# Patient Record
Sex: MALE | Race: WHITE | NOT HISPANIC OR LATINO | Employment: OTHER | URBAN - METROPOLITAN AREA
[De-identification: names, ages, dates, MRNs, and addresses within clinical notes are randomized per-mention and may not be internally consistent; named-entity substitution may affect disease eponyms.]

---

## 2017-01-03 ENCOUNTER — TELEPHONE (OUTPATIENT)
Dept: INTERNAL MEDICINE | Facility: CLINIC | Age: 82
End: 2017-01-03

## 2017-01-03 NOTE — TELEPHONE ENCOUNTER
----- Message from Bushra Gonzalez sent at 1/3/2017 11:08 AM CST -----  Contact: Florencia Redmond [phone]  Checking on the results of the echo that was taken.  Trying to schedule his surgery but waiting on this.  Please call her at [phone]

## 2017-01-04 ENCOUNTER — OFFICE VISIT (OUTPATIENT)
Dept: INTERNAL MEDICINE | Facility: CLINIC | Age: 82
End: 2017-01-04
Payer: MEDICARE

## 2017-01-04 VITALS
HEART RATE: 76 BPM | WEIGHT: 156.06 LBS | RESPIRATION RATE: 12 BRPM | TEMPERATURE: 98 F | DIASTOLIC BLOOD PRESSURE: 70 MMHG | SYSTOLIC BLOOD PRESSURE: 106 MMHG | BODY MASS INDEX: 23.65 KG/M2 | HEIGHT: 68 IN

## 2017-01-04 DIAGNOSIS — Z95.810 AICD (AUTOMATIC CARDIOVERTER/DEFIBRILLATOR) PRESENT: ICD-10-CM

## 2017-01-04 DIAGNOSIS — E78.5 HYPERLIPIDEMIA, UNSPECIFIED HYPERLIPIDEMIA TYPE: ICD-10-CM

## 2017-01-04 DIAGNOSIS — I48.20 CHRONIC ATRIAL FIBRILLATION: ICD-10-CM

## 2017-01-04 DIAGNOSIS — I10 HTN (HYPERTENSION), BENIGN: ICD-10-CM

## 2017-01-04 DIAGNOSIS — E03.9 HYPOTHYROIDISM, UNSPECIFIED TYPE: ICD-10-CM

## 2017-01-04 DIAGNOSIS — I25.119 CORONARY ARTERY DISEASE INVOLVING NATIVE CORONARY ARTERY OF NATIVE HEART WITH ANGINA PECTORIS: ICD-10-CM

## 2017-01-04 DIAGNOSIS — S76.211A GROIN STRAIN, RIGHT, INITIAL ENCOUNTER: Primary | ICD-10-CM

## 2017-01-04 PROCEDURE — 1125F AMNT PAIN NOTED PAIN PRSNT: CPT | Mod: S$GLB,,, | Performed by: INTERNAL MEDICINE

## 2017-01-04 PROCEDURE — 99999 PR PBB SHADOW E&M-EST. PATIENT-LVL III: CPT | Mod: PBBFAC,,, | Performed by: INTERNAL MEDICINE

## 2017-01-04 PROCEDURE — 3074F SYST BP LT 130 MM HG: CPT | Mod: S$GLB,,, | Performed by: INTERNAL MEDICINE

## 2017-01-04 PROCEDURE — 1157F ADVNC CARE PLAN IN RCRD: CPT | Mod: S$GLB,,, | Performed by: INTERNAL MEDICINE

## 2017-01-04 PROCEDURE — 1159F MED LIST DOCD IN RCRD: CPT | Mod: S$GLB,,, | Performed by: INTERNAL MEDICINE

## 2017-01-04 PROCEDURE — 99213 OFFICE O/P EST LOW 20 MIN: CPT | Mod: S$GLB,,, | Performed by: INTERNAL MEDICINE

## 2017-01-04 PROCEDURE — 1160F RVW MEDS BY RX/DR IN RCRD: CPT | Mod: S$GLB,,, | Performed by: INTERNAL MEDICINE

## 2017-01-04 PROCEDURE — 3078F DIAST BP <80 MM HG: CPT | Mod: S$GLB,,, | Performed by: INTERNAL MEDICINE

## 2017-01-04 RX ORDER — TRAMADOL HYDROCHLORIDE 50 MG/1
50 TABLET ORAL EVERY 6 HOURS PRN
Qty: 100 TABLET | Refills: 3 | Status: SHIPPED | OUTPATIENT
Start: 2017-01-04 | End: 2017-01-14

## 2017-01-04 NOTE — MR AVS SNAPSHOT
[facility] - Internal Medicine  [address] [place], [place] [address], [address]  [place] [address]  Phone: [phone]  Fax: [phone]                  Danyel Rangel   2017 2:30 PM   Office Visit    Description:  Male : 1935   Provider:  Pa Spence MD   Department:  [facility] - Internal Medicine           Reason for Visit     Leg Pain           Diagnoses this Visit        Comments    Groin strain, right, initial encounter    -  Primary     Coronary artery disease involving native coronary artery of native heart with angina pectoris         Chronic atrial fibrillation         HTN (hypertension), benign         Hyperlipidemia, unspecified hyperlipidemia type         AICD (automatic cardioverter/defibrillator) present         Hypothyroidism, unspecified type                To Do List           Future Appointments        Provider Department Dept Phone    2017 9:15 AM Emma Melgar MD [facility] - Cardiology [phone]      Goals (5 Years of Data)     None      Follow-Up and Disposition     Return if symptoms worsen or fail to improve.       These Medications        Disp Refills Start End    tramadol (ULTRAM) 50 mg tablet 100 tablet 3 2017    Take 1 tablet (50 mg total) by mouth every 6 (six) hours as needed for Pain. - Oral    Pharmacy: [facility] [address] [place] [address] [address] [facility] Ph #: [phone]         [facility] On Call     [facility] On Call Nurse Care Line - / Assistance  Registered nurses in the [facility] On Call Center provide clinical advisement, health education, appointment booking, and other advisory services.  Call for this free service at [phone].             Medications           Message regarding Medications     Verify the changes and/or additions to your medication regime listed below are the same as discussed with your clinician today.  If any of these changes or additions are incorrect, please notify your  "healthcare provider.        START taking these NEW medications        Refills    tramadol (ULTRAM) 50 mg tablet 3    Sig: Take 1 tablet (50 mg total) by mouth every 6 (six) hours as needed for Pain.    Class: Normal    Route: Oral           Verify that the below list of medications is an accurate representation of the medications you are currently taking.  If none reported, the list may be blank. If incorrect, please contact your healthcare provider. Carry this list with you in case of emergency.           Current Medications     allopurinol (ZYLOPRIM) 300 MG tablet Take 300 mg by mouth once daily.    levothyroxine (SYNTHROID) 50 MCG tablet Take 50 mcg by mouth once daily.    lisinopril (PRINIVIL,ZESTRIL) 20 MG tablet Take 20 mg by mouth once daily.    metoprolol succinate (TOPROL-XL) 25 MG 24 hr tablet Take 25 mg by mouth once daily.    potassium chloride SA (K-DUR,KLOR-CON) 10 MEQ tablet Take 10 mEq by mouth 2 (two) times daily.    simvastatin (ZOCOR) 20 MG tablet Take 20 mg by mouth every evening.    apixaban (ELIQUIS) 5 mg Tab Take 1 tablet (5 mg total) by mouth 2 (two) times daily.    furosemide (LASIX) 20 MG tablet Take 20 mg by mouth 2 (two) times daily.    ondansetron (ZOFRAN-ODT) 4 MG TbDL Take 1 tablet (4 mg total) by mouth every 6 (six) hours as needed.    tramadol (ULTRAM) 50 mg tablet Take 1 tablet (50 mg total) by mouth every 6 (six) hours as needed for Pain.           Clinical Reference Information           Vital Signs - Last Recorded  Most recent update: 1/4/2017  2:14 PM by Richi Mendez MA    BP Pulse Temp Resp Ht Wt    106/70 76 97.8 °F (36.6 °C) (Oral) 12 5' 8" (1.727 m) 70.8 kg (156 lb 1.4 oz)    BMI                23.73 kg/m2          Blood Pressure          Most Recent Value    BP  106/70      Allergies as of 1/4/2017     Xarelto [Rivaroxaban]      Immunizations Administered on Date of Encounter - 1/4/2017     None      Orders Placed During Today's Visit      Normal Orders This Visit    " Ambulatory Referral to Physical/Occupational Therapy

## 2017-01-04 NOTE — PROGRESS NOTES
Subjective:       Patient ID: Danyel Rangel is a 81 y.o. male.    Chief Complaint: Leg Pain (c/o pain to right upper thigh/groin area x 3 weeks, patient requests injection)    HPI  Pt still c/o R groin pain, poor response to muscle relaxers.  Walking with walker.  Review of Systems    Objective:      Physical Exam   Constitutional: He appears well-developed. No distress.   HENT:   Head: Normocephalic.   Eyes: EOM are normal. No scleral icterus.   Neck: Normal range of motion. No tracheal deviation present.   Cardiovascular: Normal rate, regular rhythm and intact distal pulses.    Pulmonary/Chest: Effort normal. No respiratory distress.   Abdominal: He exhibits no distension.   Musculoskeletal: He exhibits no edema.   Pain with ext rotation R hip   Neurological: He is alert.   Skin: Skin is warm and dry. No rash noted. He is not diaphoretic. No erythema.   Psychiatric: He has a normal mood and affect. His behavior is normal.   Vitals reviewed.      Assessment:       1. Groin strain, right, initial encounter    2. Coronary artery disease involving native coronary artery of native heart with angina pectoris    3. Chronic atrial fibrillation    4. HTN (hypertension), benign    5. Hyperlipidemia, unspecified hyperlipidemia type    6. AICD (automatic cardioverter/defibrillator) present    7. Hypothyroidism, unspecified type        Plan:       Danyel was seen today for leg pain.    Diagnoses and all orders for this visit:    Groin strain, right, initial encounter  -     tramadol (ULTRAM) 50 mg tablet; Take 1 tablet (50 mg total) by mouth every 6 (six) hours as needed for Pain.  -     Ambulatory Referral to Physical/Occupational Therapy    Coronary artery disease involving native coronary artery of native heart with angina pectoris    Chronic atrial fibrillation    HTN (hypertension), benign   Well-cont    Hyperlipidemia, unspecified hyperlipidemia type    AICD (automatic cardioverter/defibrillator)  present    Hypothyroidism, unspecified type      Return if symptoms worsen or fail to improve.

## 2017-01-09 ENCOUNTER — HOSPITAL ENCOUNTER (OUTPATIENT)
Dept: PREADMISSION TESTING | Facility: HOSPITAL | Age: 82
Discharge: HOME | End: 2017-01-09
Payer: MEDICARE

## 2017-01-09 ENCOUNTER — ANESTHESIA EVENT (OUTPATIENT)
Dept: SURGERY | Facility: HOSPITAL | Age: 82
End: 2017-01-09
Payer: MEDICARE

## 2017-01-09 DIAGNOSIS — Z01.818 PRE-OP TESTING: Primary | ICD-10-CM

## 2017-01-09 RX ORDER — CEFAZOLIN SODIUM 2 G/50ML
2 SOLUTION INTRAVENOUS
Status: CANCELLED | OUTPATIENT
Start: 2017-01-10

## 2017-01-09 RX ORDER — SODIUM CHLORIDE, SODIUM LACTATE, POTASSIUM CHLORIDE, CALCIUM CHLORIDE 600; 310; 30; 20 MG/100ML; MG/100ML; MG/100ML; MG/100ML
INJECTION, SOLUTION INTRAVENOUS CONTINUOUS
Status: CANCELLED | OUTPATIENT
Start: 2017-01-09

## 2017-01-09 RX ORDER — LIDOCAINE HYDROCHLORIDE 10 MG/ML
1 INJECTION, SOLUTION EPIDURAL; INFILTRATION; INTRACAUDAL; PERINEURAL ONCE
Status: CANCELLED | OUTPATIENT
Start: 2017-01-09 | End: 2017-01-09

## 2017-01-09 NOTE — ANESTHESIA PREPROCEDURE EVALUATION
"                                                                                                             01/09/2017     Danyel Rangel is a 81 y.o., male is scheduled for ORIF right humerus under reg/gen on 1/10/2017.    PCP note in [facility]:  Pt "clear for GA."  Cardiology communication in [facility]    Dual chamber ICD [employer] Ilsto 7DRT  Serial # [id number]  Indication for placement:  Atrial fibrillation  Last interrogated:  4/2016 at outside facility (14% atrial pacing with 80% a-fib)  Now followed by Dr. JOSIE Melgar with next interrogation 2/2017  Per [employer] tech support:  Place magnet over device for the duration of the procedure.      Past Surgical History   Procedure Laterality Date    Coronary artery bypass graft  2006    Cardiac catheterization  06/2016    Total knee arthroplasty Left 2010             [facility] Anesthesia Evaluation    I have reviewed the Patient Summary Reports.    I have reviewed the Nursing Notes.   I have reviewed the Medications.     Review of Systems  Anesthesia Hx:  No problems with previous Anesthesia  History of prior surgery of interest to airway management or planning: heart surgery. Previous anesthesia: General  Denies Personal Hx of Anesthesia complications.   Social:  Non-Smoker, No Alcohol Use    Hematology/Oncology:        Hematology Comments: ASA and apixaban on hold per cardiology and PCP; will resume post-op per PCP   EENT/Dental:EENT/Dental Normal   Cardiovascular:   Exercise tolerance: good Pacemaker (placed initially in 2010 atrial fibrillation with upgrade to ICD 3/2015; right chest) Hypertension, well controlled Past MI (2010) CAD  CABG/stent (2010 and 2016) Dysrhythmias (rate controlled with apixaban) atrial fibrillation  Denies Angina.     hyperlipidemia     ECG has been reviewed.    Pulmonary:   Denies Shortness of breath.    Renal/:  Renal/ Normal     Hepatic/GI:   GERD, well controlled    Musculoskeletal:   Arthritis  Fracture of right humerus s/p fall 12/2016 " denies pain and states does not have problems moving arm up; pt also states he strained right  groin during fall and has been using a walker since that time.   Neurological:  Neurology Normal    Endocrine:   Hypothyroidism        Physical Exam  General:  Well nourished    Airway/Jaw/Neck:  Airway Findings: Mouth Opening: Normal Tongue: Normal  General Airway Assessment: Adult  Mallampati: II  TM Distance: Normal, at least 6 cm        Eyes/Ears/Nose:  EYES/EARS/NOSE FINDINGS: Normal   Dental:  Dental Findings: Periodontal disease, Severe   Chest/Lungs:  Chest/Lungs Clear    Heart/Vascular:  Heart Findings: Normal Heart murmur: negative Other Heart Findings: Dual chamber ICD right chest     Abdomen:  Abdomen Findings: Normal    Musculoskeletal:  Musculoskeletal Findings: (RUE splint present ) Gait disturbance, unsteady gait and walks with walker     Mental Status:  Mental Status Findings: Normal        2D Echo w/CFD 12/28/2016  CONCLUSIONS     1 - Normal left ventricular systolic function (EF 55-60%).     2 - Concentric remodeling.     3 - Normal right ventricular systolic function .     4 - The estimated PA systolic pressure is 31 mmHg.     5 - Biatrial enlargement.   This document has been electronically    SIGNED BY: Emma Melgar MD On: 12/29/2016 08:40      Anesthesia Plan  Type of Anesthesia, risks & benefits discussed:  Anesthesia Type:  regional, general  Patient's Preference:   Intra-op Monitoring Plan:   Intra-op Monitoring Plan Comments:   Post Op Pain Control Plan:   Post Op Pain Control Plan Comments:   Induction:   IV  Beta Blocker:  Patient is on a Beta-Blocker and has received one dose within the past 24 hours (No further documentation required).       Informed Consent: Patient understands risks and agrees with Anesthesia plan.  Questions answered. Anesthesia consent signed with patient.  ASA Score: 3     Day of Surgery Review of History & Physical: I have interviewed and examined the patient. I have  reviewed the patient's H&P dated:  There are no significant changes.  H&P update referred to the surgeon.     Anesthesia Plan Notes: Anesthesia consent will be obtained prior to procedure on 1/10/2017.     Dual chamber ICD [employer] Ilsto 7DRT  Serial # [id number]  Indication for placement:  Atrial fibrillation  Last interrogated:  4/2016 at outside facility (14% atrial pacing with 80% a-fib)  Now followed by Dr. JOSIE Melgar with next interrogation 2/2017  Per [employer] tech support:  Place magnet over device for the duration of the procedure.        Ready For Surgery From Anesthesia Perspective.

## 2017-01-09 NOTE — DISCHARGE INSTRUCTIONS
Your surgery is scheduled for 1/10/17.    Please report to Outpatient Surgery Intake Office on the 2nd FLOOR at 6:45 a.m.          INSTRUCTIONS IMPORTANT!!!  ¨ Do not eat or drink after 12 midnight-including water. OK to brush teeth, no   gum, candy or mints!    ¨ Take only these medicines with a small swallow of water-morning of surgery: Levothyroxine, Lisinopril, and metoprolol         ____  No powder, lotions or creams to surgical area.  ____  Please remove all jewelry, including piercings and leave at home.  ____  No money or valuables needed. Please leave at home.  ____  Please bring any documents given by your doctor.  ____  If going home the same day, arrange for a ride home. You will not be able to             drive if Anesthesia was used.  ____  Children under 18 years require a parent / guardian present the entire time             they are in surgery / recovery.  ____  Wear loose fitting clothing. Allow for dressings, bandages.  ____  Stop Aspirin, Ibuprofen, Motrin and Aleve at least 3-5 days before surgery, unless otherwise instructed by your doctor, or the nurse.   You MAY use Tylenol/acetaminophen until day of surgery.  ____  Wash the surgical area with Hibiclens the night before surgery, and again the             morning of surgery.  Be sure to rinse hibiclens off completely (if instructed by   nurse).  ____  If you take diabetic medication, do not take am of surgery unless instructed by Doctor.  ____  Call MD for temperature above 101 degrees.  ____ Stop taking any Fish Oil supplement or any Vitamins that contain Vitamin E at least 5 days prior to surgery.  ____ Do Not wear your contact lenses the day of your procedure.  You may wear your glasses.        I have read or had read and explained to me, and understand the above information.  Additional comments or instructions:  For additional questions call [phone]     Take a Hibiclens shower twice a day for 3 days prior to surgery, including the  morning of surgery.   Gargle with Listerine twice a day for 2 days prior to surgery, including the morning of surgery.      Pre-Op Bathing Instructions    Before surgery, you can play an important role in your own health.    Because skin is not sterile, we need to be sure that your skin is as free of germs as possible. By following the instructions below, you can reduce the number of germs on your skin before surgery.    IMPORTANT: You will need to shower with a special soap called Hibiclens*, available at any pharmacy.  If you are allergic to Chlorhexidine (the antiseptic in Hibiclens), use an antibacterial soap such as Dial Soap for your preoperative shower.  You will shower with Hibiclens both the night before your surgery and the morning of your surgery.  Do not use Hibiclens on the head, face or genitals to avoid injury to those areas.    STEP #1: THE NIGHT BEFORE YOUR SURGERY     1. Do not shave the area of your body where your surgery will be performed.  2. Shower and wash your hair and body as usual with your normal soap and shampoo.  3. Rinse your hair and body thoroughly after you shower to remove all soap residue.  4. With your hand, apply one packet of Hibiclens soap to the surgical site.   5. Wash the site gently for five (5) minutes. Do not scrub your skin too hard.   6. Do not wash with your regular soap after Hibiclens is used.  7. Rinse your body thoroughly.  8. Pat yourself dry with a clean, soft towel.  9. Do not use lotion, cream, or powder.  10. Wear clean clothes.    STEP #2: THE MORNING OF YOUR SURGERY     1. Repeat Step #1.    * Not to be used by people allergic to Chlorhexidine.          Anesthesia: General Anesthesia  Youre due to have surgery. During surgery, youll be given medication called anesthesia. (It is also called anesthetic.) This will keep you comfortable and pain-free. Your anesthesia provider will use general anesthesia. This sheet tells you more about it.  What is general  anesthesia?     You are watched continuously during your procedure by the anesthesia provider   General anesthesia puts you into a state like deep sleep. It goes into the bloodstream (IV anesthetics), into the lungs (gas anesthetics), or both. You feel nothing during the procedure. You will not remember it. During the procedure, the anesthesia provider monitors you continuously. He or she checks your heart rate and rhythm, blood pressure, breathing, and blood oxygen.  · IV Anesthetics. IV anesthetics are given through an IV line in your arm. Theyre often given first. This is so you are asleep before a gas anesthetic is started. Some kinds of IV anesthetics relieve pain. Others relax you. Your doctor will decide which kind is best in your case.  · Gas Anesthetics. Gas anesthetics are breathed into the lungs. They are often used to keep you asleep. They can be given through a facemask or a tube placed in your larynx or trachea (breathing tube).  ¨ If you have a facemask, your anesthesia provider will most likely place it over your nose and mouth while youre still awake. Youll breathe oxygen through the mask as your IV anesthetic is started. Gas anesthetic may be added through the mask.  ¨ If you have a tube in the larynx or trachea, it will be inserted into your throat after youre asleep.  Anesthesia tools and medications  You will likely have:  · IV anesthetics. These are put into an IV line into your bloodstream.  · Gas anesthetics. You breathe these anesthetics into your lungs, where they pass into your bloodstream.  · Pulse oximeter. This is a small clip that is attached to the end of your finger. This measures your blood oxygen level.  · Electrocardiography leads (electrodes). These are small sticky pads that are placed on your chest. They record your heart rate and rhythm.  · Blood pressure cuff. This reads your blood pressure.  Risks and possible complications  General anesthesia has some risks. These  include:  · Breathing problems  · Nausea and vomiting  · Sore throat or hoarseness (usually temporary)  · Allergic reaction to the anesthetic  · Irregular heartbeat (rare)  · Cardiac arrest (rare)   Anesthesia safety  · Follow all instructions you are given for how long not to eat or drink before your procedure.  · Be sure your doctor knows what medications and drugs you take. This includes over-the-counter medications, herbs, supplements, alcohol or other drugs. You will be asked when those were last taken.  · Have an adult family member or friend drive you home after the procedure.  · For the first 24 hours after your surgery:  ¨ Do not drive or use heavy equipment.  ¨ Have a trusted family member or spouse make important decisions or sign documents.  ¨ Avoid alcohol.  ¨ Have a responsible adult stay with you. He or she can watch for problems and help keep you safe.  © 7481-4848 [employer]. [address] [place] [address], [place] [address]. All rights reserved. This information is not intended as a substitute for professional medical care. Always follow your healthcare professional's instructions.

## 2017-01-09 NOTE — PRE-PROCEDURE INSTRUCTIONS
Spoke with Sue in Dr. [place]'s office to see if consents were done at office visit.  Sue states consents were not done and will be obtained day of procedure.

## 2017-01-09 NOTE — IP AVS SNAPSHOT
[facility]  [address]  [place] [address]  Phone: [phone]           Patient Discharge Instructions    Our goal is to set you up for success. This packet includes information on your condition, medications, and your home care. It will help you to care for yourself so you don't get sicker.     Please ask your nurse if you have any questions.        There are many details to remember when preparing for your surgery. Here is what you will need to do, please ask your nurse if there are more specific instructions and if you have any questions:    1. 24 hours before procedure Do not smoke or drink alcoholic beverages 24 hours prior to your procedure    2. Eating before procedure Do not eat or drink anything 8 hours before your procedure - this includes gum, mints, and candy.     3. Day of procedure Please remove all jewelry for the procedure. If you wear contact lenses, dentures, hearing aids or glasses, bring a container to put them in during your surgery and give to a family member for safekeeping.  If your doctor has scheduled you for an overnight stay, bring a small overnight bag with any personal items that you need.    4. After procedure Make arrangements in advance for transportation home by a responsible adult. It is not safe to drive a vehicle during the 24 hours following surgery.     PLEASE NOTE: You may be contacted the day before your surgery to confirm your surgery date and arrival time. The Surgery schedule has many variables which may affect the time of your surgery case. Family members should be available if your surgery time changes.                ** Verify the list of medication(s) below is accurate and up to date. Carry this with you in case of emergency. If your medications have changed, please notify your healthcare provider.             Medication List      TAKE these medications        Additional Info                      allopurinol 300 MG tablet   Commonly known as:   ZYLOPRIM   Refills:  0   Dose:  300 mg    Instructions:  Take 300 mg by mouth once daily.     Begin Date    AM    Noon    PM    Bedtime       apixaban 5 mg Tab   Commonly known as:  ELIQUIS   Quantity:  180 tablet   Refills:  2   Dose:  5 mg    Instructions:  Take 1 tablet (5 mg total) by mouth 2 (two) times daily.     Begin Date    AM    Noon    PM    Bedtime       furosemide 20 MG tablet   Commonly known as:  LASIX   Refills:  0   Dose:  20 mg    Instructions:  Take 20 mg by mouth 2 (two) times daily.     Begin Date    AM    Noon    PM    Bedtime       levothyroxine 50 MCG tablet   Commonly known as:  SYNTHROID   Refills:  0   Dose:  50 mcg    Instructions:  Take 50 mcg by mouth once daily.     Begin Date    AM    Noon    PM    Bedtime       lisinopril 20 MG tablet   Commonly known as:  PRINIVIL,ZESTRIL   Refills:  0   Dose:  20 mg    Instructions:  Take 20 mg by mouth once daily.     Begin Date    AM    Noon    PM    Bedtime       metoprolol succinate 25 MG 24 hr tablet   Commonly known as:  TOPROL-XL   Refills:  0   Dose:  25 mg    Instructions:  Take 25 mg by mouth once daily.     Begin Date    AM    Noon    PM    Bedtime       ondansetron 4 MG Tbdl   Commonly known as:  ZOFRAN-ODT   Quantity:  12 tablet   Refills:  1   Dose:  4 mg    Instructions:  Take 1 tablet (4 mg total) by mouth every 6 (six) hours as needed.     Begin Date    AM    Noon    PM    Bedtime       potassium chloride SA 10 MEQ tablet   Commonly known as:  K-DUR,KLOR-CON   Refills:  0   Dose:  10 mEq    Instructions:  Take 10 mEq by mouth 2 (two) times daily.     Begin Date    AM    Noon    PM    Bedtime       simvastatin 20 MG tablet   Commonly known as:  ZOCOR   Refills:  0   Dose:  20 mg    Instructions:  Take 20 mg by mouth every evening.     Begin Date    AM    Noon    PM    Bedtime       tramadol 50 mg tablet   Commonly known as:  ULTRAM   Quantity:  100 tablet   Refills:  3   Dose:  50 mg    Instructions:  Take 1 tablet (50 mg total) by  mouth every 6 (six) hours as needed for Pain.     Begin Date    AM    Noon    PM    Bedtime                  Please bring to all follow up appointments:    1. A copy of your discharge instructions.  2. All medicines you are currently taking in their original bottles.  3. Identification and insurance card.    Please arrive 15 minutes ahead of scheduled appointment time.    Please call 24 hours in advance if you must reschedule your appointment and/or time.        Your Scheduled Appointments     Feb 17, 2017  9:15 AM CST   Established Patient Visit with Emma Melgar MD   [facility]    [address] [place] [id number] [phone]              Your Future Surgeries/Procedures     Chris[place] 10, 2017   Surgery with Patrick Montana MD   [facility] ([facility])    [address]  [facility] [address] [phone]                  Discharge Instructions       Your surgery is scheduled for 1/10/17.    Please report to Outpatient Surgery Intake Office on the 2nd FLOOR at 6:45 a.m.          INSTRUCTIONS IMPORTANT!!!  ¨ Do not eat or drink after 12 midnight-including water. OK to brush teeth, no   gum, candy or mints!    ¨ Take only these medicines with a small swallow of water-morning of surgery: Levothyroxine, Lisinopril, and metoprolol         ____  No powder, lotions or creams to surgical area.  ____  Please remove all jewelry, including piercings and leave at home.  ____  No money or valuables needed. Please leave at home.  ____  Please bring any documents given by your doctor.  ____  If going home the same day, arrange for a ride home. You will not be able to             drive if Anesthesia was used.  ____  Children under 18 years require a parent / guardian present the entire time             they are in surgery / recovery.  ____  Wear loose fitting clothing. Allow for dressings, bandages.  ____  Stop Aspirin, Ibuprofen, Motrin and Aleve at least 3-5  days before surgery, unless otherwise instructed by your doctor, or the nurse.   You MAY use Tylenol/acetaminophen until day of surgery.  ____  Wash the surgical area with Hibiclens the night before surgery, and again the             morning of surgery.  Be sure to rinse hibiclens off completely (if instructed by   nurse).  ____  If you take diabetic medication, do not take am of surgery unless instructed by Doctor.  ____  Call MD for temperature above 101 degrees.  ____ Stop taking any Fish Oil supplement or any Vitamins that contain Vitamin E at least 5 days prior to surgery.  ____ Do Not wear your contact lenses the day of your procedure.  You may wear your glasses.        I have read or had read and explained to me, and understand the above information.  Additional comments or instructions:  For additional questions call [phone]     Take a Hibiclens shower twice a day for 3 days prior to surgery, including the morning of surgery.   Gargle with Listerine twice a day for 2 days prior to surgery, including the morning of surgery.      Pre-Op Bathing Instructions    Before surgery, you can play an important role in your own health.    Because skin is not sterile, we need to be sure that your skin is as free of germs as possible. By following the instructions below, you can reduce the number of germs on your skin before surgery.    IMPORTANT: You will need to shower with a special soap called Hibiclens*, available at any pharmacy.  If you are allergic to Chlorhexidine (the antiseptic in Hibiclens), use an antibacterial soap such as Dial Soap for your preoperative shower.  You will shower with Hibiclens both the night before your surgery and the morning of your surgery.  Do not use Hibiclens on the head, face or genitals to avoid injury to those areas.    STEP #1: THE NIGHT BEFORE YOUR SURGERY     1. Do not shave the area of your body where your surgery will be performed.  2. Shower and wash your hair and body as  usual with your normal soap and shampoo.  3. Rinse your hair and body thoroughly after you shower to remove all soap residue.  4. With your hand, apply one packet of Hibiclens soap to the surgical site.   5. Wash the site gently for five (5) minutes. Do not scrub your skin too hard.   6. Do not wash with your regular soap after Hibiclens is used.  7. Rinse your body thoroughly.  8. Pat yourself dry with a clean, soft towel.  9. Do not use lotion, cream, or powder.  10. Wear clean clothes.    STEP #2: THE MORNING OF YOUR SURGERY     1. Repeat Step #1.    * Not to be used by people allergic to Chlorhexidine.          Anesthesia: General Anesthesia  Youre due to have surgery. During surgery, youll be given medication called anesthesia. (It is also called anesthetic.) This will keep you comfortable and pain-free. Your anesthesia provider will use general anesthesia. This sheet tells you more about it.  What is general anesthesia?     You are watched continuously during your procedure by the anesthesia provider   General anesthesia puts you into a state like deep sleep. It goes into the bloodstream (IV anesthetics), into the lungs (gas anesthetics), or both. You feel nothing during the procedure. You will not remember it. During the procedure, the anesthesia provider monitors you continuously. He or she checks your heart rate and rhythm, blood pressure, breathing, and blood oxygen.  · IV Anesthetics. IV anesthetics are given through an IV line in your arm. Theyre often given first. This is so you are asleep before a gas anesthetic is started. Some kinds of IV anesthetics relieve pain. Others relax you. Your doctor will decide which kind is best in your case.  · Gas Anesthetics. Gas anesthetics are breathed into the lungs. They are often used to keep you asleep. They can be given through a facemask or a tube placed in your larynx or trachea (breathing tube).  ¨ If you have a facemask, your anesthesia provider will most  likely place it over your nose and mouth while youre still awake. Youll breathe oxygen through the mask as your IV anesthetic is started. Gas anesthetic may be added through the mask.  ¨ If you have a tube in the larynx or trachea, it will be inserted into your throat after youre asleep.  Anesthesia tools and medications  You will likely have:  · IV anesthetics. These are put into an IV line into your bloodstream.  · Gas anesthetics. You breathe these anesthetics into your lungs, where they pass into your bloodstream.  · Pulse oximeter. This is a small clip that is attached to the end of your finger. This measures your blood oxygen level.  · Electrocardiography leads (electrodes). These are small sticky pads that are placed on your chest. They record your heart rate and rhythm.  · Blood pressure cuff. This reads your blood pressure.  Risks and possible complications  General anesthesia has some risks. These include:  · Breathing problems  · Nausea and vomiting  · Sore throat or hoarseness (usually temporary)  · Allergic reaction to the anesthetic  · Irregular heartbeat (rare)  · Cardiac arrest (rare)   Anesthesia safety  · Follow all instructions you are given for how long not to eat or drink before your procedure.  · Be sure your doctor knows what medications and drugs you take. This includes over-the-counter medications, herbs, supplements, alcohol or other drugs. You will be asked when those were last taken.  · Have an adult family member or friend drive you home after the procedure.  · For the first 24 hours after your surgery:  ¨ Do not drive or use heavy equipment.  ¨ Have a trusted family member or spouse make important decisions or sign documents.  ¨ Avoid alcohol.  ¨ Have a responsible adult stay with you. He or she can watch for problems and help keep you safe.  © 9678-1666 [employer]. [address] [place] [address], [place] [address]. All rights reserved. This information is not intended as a  substitute for professional medical care. Always follow your healthcare professional's instructions.            Admission Information     Date & Time Provider Department CSN    1/9/2017 11:30 AM Patrick Montana MD [facility] [id number]      Care Providers     Provider Role Specialty Primary office phone    Patrick Montana MD Attending Provider Orthopedic Surgery [phone]      Recent Lab Values     No lab values to display.      Allergies as of 1/9/2017        Reactions    Xarelto [Rivaroxaban] Anaphylaxis      [facility]     [facility] Nurse Care Line - 24/7 Assistance  Unless otherwise directed by your provider, please contact [facility], our nurse care line that is available for 24/7 assistance.     Registered nurses in the [facility] provide clinical advisement, health education, appointment booking, and other advisory services.  Call for this free service at [phone].        Advance Directives     An advance directive is a document which, in the event you are no longer able to make decisions for yourself, tells your healthcare team what kind of treatment you do or do not want to receive, or who you would like to make those decisions for you.  If you do not currently have an advance directive, [facility] encourages you to create one.  For more information call:  [phone]), [phone] ([phone]),  or log on to [email or web address].        Language Assistance Services     ATTENTION: Language assistance services are available, free of charge. Please call [phone].      ATENCIÓN: Si habla español, tiene a kincaid disposición servicios gratuitos de asistencia lingüística. Llame al [phone].     [facility] Ý: N?u b?n nói Ti?ng Vi?t, có các d?ch v? h? tr? ngôn ng? mi?n phí dành cho b?n. G?i s? [phone].        Eliquis Informaiton       Apixaban Oral tablet  What is this medicine?  APIXABAN (a PIX a ban) is an anticoagulant  (blood thinner). It is used to lower the chance of stroke in people with a medical condition called atrial fibrillation. It is also used to treat or prevent blood clots in the lungs or in the veins.  This medicine may be used for other purposes; ask your health care provider or pharmacist if you have questions.  What should I tell my health care provider before I take this medicine?  They need to know if you have any of these conditions:  · bleeding disorders  · bleeding in the brain  · blood in your stools (black or tarry stools) or if you have blood in your vomit  · history of stomach bleeding  · kidney disease  · liver disease  · mechanical heart valve  · an unusual or allergic reaction to apixaban, other medicines, foods, dyes, or preservatives  · pregnant or trying to get pregnant  · breast-feeding  How should I use this medicine?  Take this medicine by mouth with a glass of water. Follow the directions on the prescription label. You can take it with or without food. If it upsets your stomach, take it with food. Take your medicine at regular intervals. Do not take it more often than directed. Do not stop taking except on your doctor's advice. Stopping this medicine may increase your risk of a blot clot. Be sure to refill your prescription before you run out of medicine.  Talk to your pediatrician regarding the use of this medicine in children. Special care may be needed.  Overdosage: If you think you have taken too much of this medicine contact a poison control center or emergency room at once.  NOTE: This medicine is only for you. Do not share this medicine with others.  What if I miss a dose?  If you miss a dose, take it as soon as you can. If it is almost time for your next dose, take only that dose. Do not take double or extra doses.  What may interact with this medicine?  This medicine may interact with the following:  · aspirin and aspirin-like medicines  · certain medicines for fungal infections like  ketoconazole and itraconazole  · certain medicines for seizures like carbamazepine and phenytoin  · certain medicines that treat or prevent blood clots like warfarin, enoxaparin, and dalteparin  · clarithromycin  · NSAIDs, medicines for pain and inflammation, like ibuprofen or naproxen  · rifampin  · ritonavir  · Emilie's wort  This list may not describe all possible interactions. Give your health care provider a list of all the medicines, herbs, non-prescription drugs, or dietary supplements you use. Also tell them if you smoke, drink alcohol, or use illegal drugs. Some items may interact with your medicine.  What should I watch for while using this medicine?  Notify your doctor or health care professional and seek emergency treatment if you develop breathing problems; changes in vision; chest pain; severe, sudden headache; pain, swelling, warmth in the leg; trouble speaking; sudden numbness or weakness of the face, arm, or leg. These can be signs that your condition has gotten worse.  If you are going to have surgery, tell your doctor or health care professional that you are taking this medicine.  Tell your health care professional that you use this medicine before you have a spinal or epidural procedure. Sometimes people who take this medicine have bleeding problems around the spine when they have a spinal or epidural procedure. This bleeding is very rare. If you have a spinal or epidural procedure while on this medicine, call your health care professional immediately if you have back pain, numbness or tingling (especially in your legs and feet), muscle weakness, paralysis, or loss of bladder or bowel control.  Avoid sports and activities that might cause injury while you are using this medicine. Severe falls or injuries can cause unseen bleeding. Be careful when using sharp tools or knives. Consider using an electric razor. Take special care brushing or flossing your teeth. Report any injuries, bruising, or  red spots on the skin to your doctor or health care professional.  What side effects may I notice from receiving this medicine?  Side effects that you should report to your doctor or health care professional as soon as possible:  · allergic reactions like skin rash, itching or hives, swelling of the face, lips, or tongue  · signs and symptoms of bleeding such as bloody or black, tarry stools; red or dark-brown urine; spitting up blood or brown material that looks like coffee grounds; red spots on the skin; unusual bruising or bleeding from the eye, gums, or nose  This list may not describe all possible side effects. Call your doctor for medical advice about side effects. You may report side effects to FDA at 1-934-HYA-1682.  Where should I keep my medicine?  Keep out of the reach of children.  Store at room temperature between 20 and 25 degrees C (68 and 77 degrees F). Throw away any unused medicine after the expiration date.  NOTE: This sheet is a summary. It may not cover all possible information. If you have questions about this medicine, talk to your doctor, pharmacist, or health care provider.  NOTE:This sheet is a summary. It may not cover all possible information. If you have questions about this medicine, talk to your doctor, pharmacist, or health care provider. Copyright© 2016 [employer]               [facility] complies with applicable Federal civil rights laws and does not discriminate on the basis of race, color, national origin, age, disability, or sex.

## 2017-01-09 NOTE — IP AVS SNAPSHOT
[facility]  [address]  [place] [address]  Phone: [phone]           I have received a copy of my After Visit Summary and discharge instructions from [facility].    INSTRUCTIONS RECEIVED AND UNDERSTOOD BY:                     Patient/Patient Representative: ________________________________________________________________     Date/Time: ________________________________________________________________                     Instructions Given By: ________________________________________________________________     Date/Time: ________________________________________________________________

## 2017-01-10 ENCOUNTER — SURGERY (OUTPATIENT)
Age: 82
End: 2017-01-10

## 2017-01-10 ENCOUNTER — HOSPITAL ENCOUNTER (OUTPATIENT)
Facility: HOSPITAL | Age: 82
Discharge: HOME | End: 2017-01-10
Payer: MEDICARE

## 2017-01-10 ENCOUNTER — ANESTHESIA (OUTPATIENT)
Dept: SURGERY | Facility: HOSPITAL | Age: 82
End: 2017-01-10
Payer: MEDICARE

## 2017-01-10 VITALS
HEIGHT: 68 IN | BODY MASS INDEX: 25.31 KG/M2 | SYSTOLIC BLOOD PRESSURE: 127 MMHG | HEART RATE: 69 BPM | WEIGHT: 167 LBS | OXYGEN SATURATION: 98 % | TEMPERATURE: 98 F | RESPIRATION RATE: 16 BRPM | DIASTOLIC BLOOD PRESSURE: 74 MMHG

## 2017-01-10 DIAGNOSIS — S42.411A CLOSED DISPLACED SIMPLE SUPRACONDYLAR FRACTURE OF RIGHT HUMERUS WITHOUT INTERCONDYLAR FRACTURE, INITIAL ENCOUNTER: Primary | ICD-10-CM

## 2017-01-10 DIAGNOSIS — I25.10 CORONARY ARTERY DISEASE INVOLVING NATIVE CORONARY ARTERY OF NATIVE HEART WITHOUT ANGINA PECTORIS: ICD-10-CM

## 2017-01-10 DIAGNOSIS — E78.5 HYPERLIPIDEMIA, UNSPECIFIED HYPERLIPIDEMIA TYPE: ICD-10-CM

## 2017-01-10 DIAGNOSIS — E03.9 HYPOTHYROIDISM, UNSPECIFIED TYPE: ICD-10-CM

## 2017-01-10 DIAGNOSIS — Z95.810 AICD (AUTOMATIC CARDIOVERTER/DEFIBRILLATOR) PRESENT: ICD-10-CM

## 2017-01-10 DIAGNOSIS — S42.409A: ICD-10-CM

## 2017-01-10 DIAGNOSIS — R79.89 ABNORMAL LFTS: ICD-10-CM

## 2017-01-10 DIAGNOSIS — I25.119 CORONARY ARTERY DISEASE INVOLVING NATIVE CORONARY ARTERY OF NATIVE HEART WITH ANGINA PECTORIS: ICD-10-CM

## 2017-01-10 DIAGNOSIS — I48.20 CHRONIC ATRIAL FIBRILLATION: ICD-10-CM

## 2017-01-10 DIAGNOSIS — Z01.818 PRE-OP TESTING: ICD-10-CM

## 2017-01-10 DIAGNOSIS — M65.321 TRIGGER INDEX FINGER OF RIGHT HAND: ICD-10-CM

## 2017-01-10 DIAGNOSIS — I10 HTN (HYPERTENSION), BENIGN: ICD-10-CM

## 2017-01-10 PROBLEM — S42.413A: Status: ACTIVE | Noted: 2017-01-10

## 2017-01-10 PROCEDURE — 36000711

## 2017-01-10 PROCEDURE — 63600175 PHARM REV CODE 636 W HCPCS

## 2017-01-10 PROCEDURE — 37000009 HC ANESTHESIA EA ADD 15 MINS

## 2017-01-10 PROCEDURE — 36000710

## 2017-01-10 PROCEDURE — 63600175 PHARM REV CODE 636 W HCPCS: Performed by: ANESTHESIOLOGY

## 2017-01-10 PROCEDURE — 27201423 OPTIME MED/SURG SUP & DEVICES STERILE SUPPLY

## 2017-01-10 PROCEDURE — 71000039 HC RECOVERY, EACH ADD'L HOUR

## 2017-01-10 PROCEDURE — 71000033 HC RECOVERY, INTIAL HOUR

## 2017-01-10 PROCEDURE — 25000003 PHARM REV CODE 250: Performed by: ANESTHESIOLOGY

## 2017-01-10 PROCEDURE — C1713 ANCHOR/SCREW BN/BN,TIS/BN: HCPCS

## 2017-01-10 PROCEDURE — 71000015 HC POSTOP RECOV 1ST HR

## 2017-01-10 PROCEDURE — 71000016 HC POSTOP RECOV ADDL HR

## 2017-01-10 PROCEDURE — C1769 GUIDE WIRE: HCPCS

## 2017-01-10 PROCEDURE — 37000008 HC ANESTHESIA 1ST 15 MINUTES

## 2017-01-10 PROCEDURE — 25000003 PHARM REV CODE 250: Performed by: NURSE PRACTITIONER

## 2017-01-10 DEVICE — SCREW CORTEX 3.5 X 24: Type: IMPLANTABLE DEVICE | Site: HUMERUS | Status: FUNCTIONAL

## 2017-01-10 DEVICE — SCREW CORTEX 3.5 X 22: Type: IMPLANTABLE DEVICE | Site: HUMERUS | Status: FUNCTIONAL

## 2017-01-10 DEVICE — K-WIRE TRCR PT1.6MM DIA 150MM: Type: IMPLANTABLE DEVICE | Site: HUMERUS | Status: FUNCTIONAL

## 2017-01-10 DEVICE — IMPLANTABLE DEVICE: Type: IMPLANTABLE DEVICE | Site: HUMERUS | Status: FUNCTIONAL

## 2017-01-10 DEVICE — SCREW LOCKING 3.5 X 20: Type: IMPLANTABLE DEVICE | Site: HUMERUS | Status: FUNCTIONAL

## 2017-01-10 DEVICE — SCREW BONE LOCK 2.7X10MM: Type: IMPLANTABLE DEVICE | Site: HUMERUS | Status: FUNCTIONAL

## 2017-01-10 DEVICE — SCREW CORTEX 3.5MM X 34MM: Type: IMPLANTABLE DEVICE | Site: HUMERUS | Status: FUNCTIONAL

## 2017-01-10 RX ORDER — HYDROCODONE BITARTRATE AND ACETAMINOPHEN 10; 325 MG/1; MG/1
1 TABLET ORAL EVERY 4 HOURS PRN
Status: DISCONTINUED | OUTPATIENT
Start: 2017-01-10 | End: 2017-01-10 | Stop reason: HOSPADM

## 2017-01-10 RX ORDER — HYDROMORPHONE HYDROCHLORIDE 1 MG/ML
0.2 INJECTION, SOLUTION INTRAMUSCULAR; INTRAVENOUS; SUBCUTANEOUS EVERY 4 HOURS PRN
Status: DISCONTINUED | OUTPATIENT
Start: 2017-01-10 | End: 2017-01-10 | Stop reason: HOSPADM

## 2017-01-10 RX ORDER — SODIUM CHLORIDE 9 MG/ML
INJECTION, SOLUTION INTRAVENOUS CONTINUOUS PRN
Status: DISCONTINUED | OUTPATIENT
Start: 2017-01-10 | End: 2017-01-10

## 2017-01-10 RX ORDER — ROCURONIUM BROMIDE 10 MG/ML
INJECTION, SOLUTION INTRAVENOUS
Status: DISCONTINUED | OUTPATIENT
Start: 2017-01-10 | End: 2017-01-10

## 2017-01-10 RX ORDER — ACETAMINOPHEN 10 MG/ML
INJECTION, SOLUTION INTRAVENOUS
Status: DISCONTINUED | OUTPATIENT
Start: 2017-01-10 | End: 2017-01-10

## 2017-01-10 RX ORDER — TRANEXAMIC ACID 100 MG/ML
INJECTION, SOLUTION INTRAVENOUS
Status: DISCONTINUED | OUTPATIENT
Start: 2017-01-10 | End: 2017-01-10

## 2017-01-10 RX ORDER — HYDROCODONE BITARTRATE AND ACETAMINOPHEN 5; 325 MG/1; MG/1
1-2 TABLET ORAL EVERY 4 HOURS PRN
Qty: 60 TABLET | Refills: 0 | Status: SHIPPED | OUTPATIENT
Start: 2017-01-10 | End: 2018-04-25 | Stop reason: ALTCHOICE

## 2017-01-10 RX ORDER — SODIUM CHLORIDE 0.9 % (FLUSH) 0.9 %
3 SYRINGE (ML) INJECTION
Status: DISCONTINUED | OUTPATIENT
Start: 2017-01-10 | End: 2017-01-10 | Stop reason: HOSPADM

## 2017-01-10 RX ORDER — CEFAZOLIN SODIUM 2 G/50ML
2 SOLUTION INTRAVENOUS
Status: DISCONTINUED | OUTPATIENT
Start: 2017-01-10 | End: 2017-01-10 | Stop reason: HOSPADM

## 2017-01-10 RX ORDER — HYDROMORPHONE HYDROCHLORIDE 2 MG/ML
0.4 INJECTION, SOLUTION INTRAMUSCULAR; INTRAVENOUS; SUBCUTANEOUS EVERY 5 MIN PRN
Status: DISCONTINUED | OUTPATIENT
Start: 2017-01-10 | End: 2017-01-10 | Stop reason: HOSPADM

## 2017-01-10 RX ORDER — ONDANSETRON 2 MG/ML
4 INJECTION INTRAMUSCULAR; INTRAVENOUS DAILY PRN
Status: DISCONTINUED | OUTPATIENT
Start: 2017-01-10 | End: 2017-01-10 | Stop reason: HOSPADM

## 2017-01-10 RX ORDER — LIDOCAINE HCL/PF 100 MG/5ML
SYRINGE (ML) INTRAVENOUS
Status: DISCONTINUED | OUTPATIENT
Start: 2017-01-10 | End: 2017-01-10

## 2017-01-10 RX ORDER — PROPOFOL 10 MG/ML
INJECTION, EMULSION INTRAVENOUS
Status: DISCONTINUED | OUTPATIENT
Start: 2017-01-10 | End: 2017-01-10

## 2017-01-10 RX ORDER — SODIUM CHLORIDE, SODIUM LACTATE, POTASSIUM CHLORIDE, CALCIUM CHLORIDE 600; 310; 30; 20 MG/100ML; MG/100ML; MG/100ML; MG/100ML
INJECTION, SOLUTION INTRAVENOUS CONTINUOUS
Status: DISCONTINUED | OUTPATIENT
Start: 2017-01-10 | End: 2017-01-10 | Stop reason: HOSPADM

## 2017-01-10 RX ORDER — OXYCODONE AND ACETAMINOPHEN 5; 325 MG/1; MG/1
1 TABLET ORAL
Status: DISCONTINUED | OUTPATIENT
Start: 2017-01-10 | End: 2017-01-10 | Stop reason: HOSPADM

## 2017-01-10 RX ORDER — HYDROMORPHONE HYDROCHLORIDE 2 MG/ML
0.2 INJECTION, SOLUTION INTRAMUSCULAR; INTRAVENOUS; SUBCUTANEOUS EVERY 5 MIN PRN
Status: DISCONTINUED | OUTPATIENT
Start: 2017-01-10 | End: 2017-01-10 | Stop reason: HOSPADM

## 2017-01-10 RX ORDER — PHENYLEPHRINE HYDROCHLORIDE 10 MG/ML
INJECTION INTRAVENOUS
Status: DISCONTINUED | OUTPATIENT
Start: 2017-01-10 | End: 2017-01-10

## 2017-01-10 RX ORDER — LIDOCAINE HYDROCHLORIDE 10 MG/ML
1 INJECTION, SOLUTION EPIDURAL; INFILTRATION; INTRACAUDAL; PERINEURAL ONCE
Status: DISCONTINUED | OUTPATIENT
Start: 2017-01-10 | End: 2017-01-10 | Stop reason: HOSPADM

## 2017-01-10 RX ORDER — SUCCINYLCHOLINE CHLORIDE 20 MG/ML
INJECTION INTRAMUSCULAR; INTRAVENOUS
Status: DISCONTINUED | OUTPATIENT
Start: 2017-01-10 | End: 2017-01-10

## 2017-01-10 RX ORDER — SODIUM CHLORIDE 0.9 % (FLUSH) 0.9 %
3 SYRINGE (ML) INJECTION EVERY 8 HOURS
Status: DISCONTINUED | OUTPATIENT
Start: 2017-01-10 | End: 2017-01-10 | Stop reason: HOSPADM

## 2017-01-10 RX ORDER — FENTANYL CITRATE 50 UG/ML
INJECTION, SOLUTION INTRAMUSCULAR; INTRAVENOUS
Status: DISCONTINUED | OUTPATIENT
Start: 2017-01-10 | End: 2017-01-10

## 2017-01-10 RX ORDER — ONDANSETRON 2 MG/ML
INJECTION INTRAMUSCULAR; INTRAVENOUS
Status: DISCONTINUED | OUTPATIENT
Start: 2017-01-10 | End: 2017-01-10

## 2017-01-10 RX ADMIN — HYDROMORPHONE HYDROCHLORIDE 0.4 MG: 2 INJECTION, SOLUTION INTRAMUSCULAR; INTRAVENOUS; SUBCUTANEOUS at 11:01

## 2017-01-10 RX ADMIN — CEFAZOLIN SODIUM 2 G: 2 SOLUTION INTRAVENOUS at 09:01

## 2017-01-10 RX ADMIN — ROCURONIUM BROMIDE 5 MG: 10 INJECTION, SOLUTION INTRAVENOUS at 09:01

## 2017-01-10 RX ADMIN — SODIUM CHLORIDE: 0.9 INJECTION, SOLUTION INTRAVENOUS at 09:01

## 2017-01-10 RX ADMIN — TRANEXAMIC ACID 1000 MG: 100 INJECTION, SOLUTION INTRAVENOUS at 10:01

## 2017-01-10 RX ADMIN — TRANEXAMIC ACID 1000 MG: 100 INJECTION, SOLUTION INTRAVENOUS at 09:01

## 2017-01-10 RX ADMIN — PHENYLEPHRINE HYDROCHLORIDE 100 MCG: 10 INJECTION INTRAVENOUS at 09:01

## 2017-01-10 RX ADMIN — FENTANYL CITRATE 100 MCG: 50 INJECTION, SOLUTION INTRAMUSCULAR; INTRAVENOUS at 09:01

## 2017-01-10 RX ADMIN — SUCCINYLCHOLINE CHLORIDE 140 MG: 20 INJECTION, SOLUTION INTRAMUSCULAR; INTRAVENOUS at 09:01

## 2017-01-10 RX ADMIN — FENTANYL CITRATE 25 MCG: 50 INJECTION, SOLUTION INTRAMUSCULAR; INTRAVENOUS at 10:01

## 2017-01-10 RX ADMIN — OXYCODONE HYDROCHLORIDE AND ACETAMINOPHEN 1 TABLET: 5; 325 TABLET ORAL at 12:01

## 2017-01-10 RX ADMIN — ACETAMINOPHEN 1000 MG: 10 INJECTION, SOLUTION INTRAVENOUS at 10:01

## 2017-01-10 RX ADMIN — LIDOCAINE HYDROCHLORIDE 100 MG: 20 INJECTION, SOLUTION INTRAVENOUS at 09:01

## 2017-01-10 RX ADMIN — PROPOFOL 120 MG: 10 INJECTION, EMULSION INTRAVENOUS at 09:01

## 2017-01-10 RX ADMIN — SODIUM CHLORIDE, SODIUM LACTATE, POTASSIUM CHLORIDE, AND CALCIUM CHLORIDE: .6; .31; .03; .02 INJECTION, SOLUTION INTRAVENOUS at 08:01

## 2017-01-10 RX ADMIN — ONDANSETRON 4 MG: 2 INJECTION, SOLUTION INTRAMUSCULAR; INTRAVENOUS at 10:01

## 2017-01-10 NOTE — IP AVS SNAPSHOT
[facility]  [address]  [place] [address]  Phone: [phone]           I have received a copy of my After Visit Summary and discharge instructions from [facility].    INSTRUCTIONS RECEIVED AND UNDERSTOOD BY:                     Patient/Patient Representative: ________________________________________________________________     Date/Time: ________________________________________________________________                     Instructions Given By: ________________________________________________________________     Date/Time: ________________________________________________________________

## 2017-01-10 NOTE — H&P
H&P completed on 12/28/2016 has been reviewed, the patient has been examined and:  I concur with the findings and no changes have occurred since H&P was written.    There are no hospital problems to display for this patient.

## 2017-01-10 NOTE — ANESTHESIA POSTPROCEDURE EVALUATION
"Anesthesia Post Evaluation    Patient: Danyel Rangel    Procedure(s) Performed: Procedure(s) (LRB):  OPEN REDUCTION INTERNAL FIXATION-HUMERUS (Right)    Final Anesthesia Type: general  Patient location during evaluation: PACU  Patient participation: Yes- Able to Participate  Level of consciousness: awake and alert and oriented  Post-procedure vital signs: reviewed and stable  Pain management: adequate  Airway patency: patent  PONV status at discharge: No PONV  Anesthetic complications: no      Cardiovascular status: blood pressure returned to baseline and hemodynamically stable  Respiratory status: unassisted, spontaneous ventilation and room air  Hydration status: euvolemic  Follow-up not needed.        Visit Vitals    /74    Pulse 70    Temp 36 °C (96.8 °F) (Oral)    Resp 18    Ht 5' 8" (1.727 m)    Wt 75.8 kg (167 lb)    SpO2 99%    BMI 25.39 kg/m2       Pain/Anthony Score: Pain Assessment Performed: Yes (1/10/2017 11:25 AM)  Presence of Pain: denies (1/10/2017 11:25 AM)  Pain Rating Prior to Med Admin: 6 (1/10/2017 12:18 PM)  Anthony Score: 8 (1/10/2017 11:25 AM)      "

## 2017-01-10 NOTE — TRANSFER OF CARE
"Anesthesia Transfer of Care Note    Patient: Danyel Rangel    Procedure(s) Performed: Procedure(s) (LRB):  OPEN REDUCTION INTERNAL FIXATION-HUMERUS (Right)    Patient location: PACU    Anesthesia Type: general    Transport from OR: Transported from OR on 6-10 L/min O2 by face mask with adequate spontaneous ventilation    Post pain: adequate analgesia    Post assessment: no apparent anesthetic complications and tolerated procedure well    Post vital signs: stable    Level of consciousness: awake, alert and oriented    Nausea/Vomiting: no nausea/vomiting    Complications: none          Last vitals:   Visit Vitals    /64 (BP Location: Left arm, Patient Position: Sitting, BP Method: Automatic)    Pulse 75    Temp 36 °C (96.8 °F) (Oral)    Resp 14    Ht 5' 8" (1.727 m)    Wt 75.8 kg (167 lb)    SpO2 100%    BMI 25.39 kg/m2     "

## 2017-01-10 NOTE — OP NOTE
DATE OF PROCEDURE:  01/10/2017.    CURRENT TIME:  11:09 a.m.    PREOPERATIVE DIAGNOSIS:  Displaced supracondylar right distal humerus fracture.    POSTOPERATIVE DIAGNOSIS:  Displaced supracondylar right distal humerus fracture.    PROCEDURE:  Open reduction internal fixation of right distal humerus fracture.    SURGEON:  Patrick Montana M.D.    ANESTHESIA:  General.    ESTIMATED BLOOD LOSS:  None.    SPECIMENS:  None.    COMPLICATIONS:  None.    TOURNIQUET TIME:  80 minutes at 225 mmHg.    INDICATIONS:  Mr. Rangel is an 81-year-old gentleman with the above fracture   that he sustained in a fall several weeks ago.  Informed consent was obtained   discussing risks, benefits and alternatives of surgery as noted on the consent   form as well as preoperative clearance by his primary care doctor as the patient   has history of cardiac problems.  Preoperative marking was performed in the   holding area and verified by the patient.  Timeout was performed in the   Operating Room just prior to making skin incision.    PROCEDURE IN DETAIL:  The patient was taken to the Operating Room, placed on the   operating table in supine position.  After an adequate level of anesthesia was   obtained, right upper extremity was prepped and draped in usual sterile fashion.    Preoperative prophylactic IV Ancef was given and the patient was positioned in   the left lateral decubitus position with the right arm on a bolster.  Right   upper extremity was exsanguinated and tourniquet about the proximal arm inflated   to 225 mmHg.  A curvilinear incision along the posterolateral aspect of the   elbow was made sharply with the scalpel and carried through to the subcutaneous   tissue.  Flaps were then developed medially and laterally and the ulnar nerve   was identified and freed up from the cubital tunnel.  Later we placed back in   the cubital tunnel during closure.  Throughout the procedure, care was taken to   protect the ulnar nerve.   Periosteum along the posterior humerous was elevated   and the triceps muscle was elevated maintaining it attached to the olecranon.    The fracture was reduced and initially provisionally stabilized medially with a   Synthes 3.5/2.7 one hole medial supracondylar distal humerus plate and   stabilized proximally and distally.  Next, the fascia along the lateral aspect   of the triceps tendon was divided and freed up down to the bone and distal   lateral humerus.  Radial nerve was identified proximally in the wound.  The   fracture was further reduced laterally and then a Synthes 3-hole 3.5/2.7 locking   posterolateral supracondylar distal humerus plate was applied, stabilized   proximally and distally with cortical bone screws.  C-arm was then used to   visualize the fracture and ascertained that the fracture was sufficiently   reduced.  Further distal and proximal locking screws were applied in both plates   and then the arm was brought through range of motion and was stable with no   crepitus or deformity.  C-arm was once again used to visualize all hardware was   in good position.  One screw medially was replaced and then otherwise at this   point, the radial and ulnar nerves were inspected and found to be stable and   intact and the fascia was repaired medially and laterally over both nerves   protecting the nerves.  The ulnar nerve was placed back in the cubital tunnel   during closure and was free within the cubital tunnel.  Subcutaneous was then   closed with 2-0 Vicryl and skin was closed in a running subcuticular stitch with   2-0 PDS Quill and sterile bandage was applied over the elbow.  A well-padded   soft tissue bandage was then applied.  Sling was applied.  Tourniquet was   deflated.  The patient was awakened after returning supine position and taken to   Recovery Room in stable condition.  No complications.    PLAN:  Ice, elevation, pain control, monitor neurovascular status.  Followup in   2 weeks.   Prescription for hydrocodone 5 mg 1 to 2 q. 4 hours p.r.n. pain 60   tablets is provided at the time of discharge.      BEKAH  dd: 01/10/2017 11:15:16 (CST)  td: 01/10/2017 12:59:29 (CST)  Doc ID   #[id number]  Job ID #[id number]    CC:

## 2017-01-10 NOTE — PLAN OF CARE
OK to release from PACU per Dr. Dubon.    Report given to REYNALDO Dey. Time allotted for questions.

## 2017-01-10 NOTE — PLAN OF CARE
Patient sitting up in bed, family at bedside. Patient aaox3. Vss. C/o pain with movement. Patient speaks english and refuses an . Patient refuses security, family at bedside to hold belongings. Patients granddaughter, jaxon, [facility] bedside, [phone].Oriented the patient to the room and pre procedure protocol.

## 2017-01-10 NOTE — IP AVS SNAPSHOT
[facility]  [address]  [place] [address]  Phone: [phone]           Patient Discharge Instructions     Our goal is to set you up for success. This packet includes information on your condition, medications, and your home care. It will help you to care for yourself so you don't get sicker and need to go back to the hospital.     Please ask your nurse if you have any questions.        There are many details to remember when preparing to leave the hospital. Here is what you will need to do:    1. Take your medicine. If you are prescribed medications, review your Medication List in the following pages. You may have new medications to  at the pharmacy and others that you'll need to stop taking. Review the instructions for how and when to take your medications. Talk with your doctor or nurses if you are unsure of what to do.     2. Go to your follow-up appointments. Specific follow-up information is listed in the following pages. Your may be contacted by a transition nurse or clinical provider about future appointments. Be sure we have all of the phone numbers to reach you, if needed. Please contact your provider's office if you are unable to make an appointment.     3. Watch for warning signs. Your doctor or nurse will give you detailed warning signs to watch for and when to call for assistance. These instructions may also include educational information about your condition. If you experience any of warning signs to your health, call your doctor.               ** Verify the list of medication(s) below is accurate and up to date. Carry this with you in case of emergency. If your medications have changed, please notify your healthcare provider.             Medication List      START taking these medications        Additional Info                      arm brace Misc   Refills:  0   Dose:  1 Device   Comments:  Arm sling to rt arm    Instructions:  1 Device by Misc.(Non-Drug; Combo Route)  route continuous.     Begin Date    AM    Noon    PM    Bedtime       hydrocodone-acetaminophen 5-325mg 5-325 mg per tablet   Commonly known as:  NORCO   Quantity:  60 tablet   Refills:  0   Dose:  1-2 tablet    Instructions:  Take 1-2 tablets by mouth every 4 (four) hours as needed for Pain.     Begin Date    AM    Noon    PM    Bedtime         CONTINUE taking these medications        Additional Info                      allopurinol 300 MG tablet   Commonly known as:  ZYLOPRIM   Refills:  0   Dose:  300 mg    Instructions:  Take 300 mg by mouth once daily.     Begin Date    AM    Noon    PM    Bedtime       apixaban 5 mg Tab   Commonly known as:  ELIQUIS   Quantity:  180 tablet   Refills:  2   Dose:  5 mg    Instructions:  Take 1 tablet (5 mg total) by mouth 2 (two) times daily.     Begin Date    AM    Noon    PM    Bedtime       furosemide 20 MG tablet   Commonly known as:  LASIX   Refills:  0   Dose:  20 mg    Instructions:  Take 20 mg by mouth 2 (two) times daily.     Begin Date    AM    Noon    PM    Bedtime       levothyroxine 50 MCG tablet   Commonly known as:  SYNTHROID   Refills:  0   Dose:  50 mcg    Instructions:  Take 50 mcg by mouth once daily.     Begin Date    AM    Noon    PM    Bedtime       lisinopril 20 MG tablet   Commonly known as:  PRINIVIL,ZESTRIL   Refills:  0   Dose:  20 mg    Instructions:  Take 20 mg by mouth once daily.     Begin Date    AM    Noon    PM    Bedtime       metoprolol succinate 25 MG 24 hr tablet   Commonly known as:  TOPROL-XL   Refills:  0   Dose:  25 mg    Instructions:  Take 25 mg by mouth once daily.     Begin Date    AM    Noon    PM    Bedtime       ondansetron 4 MG Tbdl   Commonly known as:  ZOFRAN-ODT   Quantity:  12 tablet   Refills:  1   Dose:  4 mg    Instructions:  Take 1 tablet (4 mg total) by mouth every 6 (six) hours as needed.     Begin Date    AM    Noon    PM    Bedtime       potassium chloride SA 10 MEQ tablet   Commonly known as:  K-DUR,KLOR-CON    Refills:  0   Dose:  10 mEq    Instructions:  Take 10 mEq by mouth 2 (two) times daily.     Begin Date    AM    Noon    PM    Bedtime       simvastatin 20 MG tablet   Commonly known as:  ZOCOR   Refills:  0   Dose:  20 mg    Instructions:  Take 20 mg by mouth every evening.     Begin Date    AM    Noon    PM    Bedtime       tramadol 50 mg tablet   Commonly known as:  ULTRAM   Quantity:  100 tablet   Refills:  3   Dose:  50 mg    Instructions:  Take 1 tablet (50 mg total) by mouth every 6 (six) hours as needed for Pain.     Begin Date    AM    Noon    PM    Bedtime            Where to Get Your Medications      You can get these medications from any pharmacy     Bring a paper prescription for each of these medications     hydrocodone-acetaminophen 5-325mg 5-325 mg per tablet       You don't need a prescription for these medications     arm brace Misc                  Please bring to all follow up appointments:    1. A copy of your discharge instructions.  2. All medicines you are currently taking in their original bottles.  3. Identification and insurance card.    Please arrive 15 minutes ahead of scheduled appointment time.    Please call 24 hours in advance if you must reschedule your appointment and/or time.        Your Scheduled Appointments     Feb 17, 2017  9:15 AM CST   Established Patient Visit with Emma Melgar MD   [facility]    [address] [place] [address] [phone]              Follow-up Information     Follow up with Patrick Montana MD In 2 weeks.    Specialty:  Orthopedic Surgery    Why:  For suture removal, For wound re-check    Contact information:    [address] [facility]  [address]  [place] [address] [phone]          Discharge Instructions     Future Orders    Call MD for:  difficulty breathing, headache or visual disturbances     Call MD for:  extreme fatigue     Call MD for:  hives     Call MD for:  persistent dizziness or light-headedness     Call  "MD for:  persistent nausea and vomiting     Call MD for:  redness, tenderness, or signs of infection (pain, swelling, redness, odor or green/yellow discharge around incision site)     Call MD for:  severe uncontrolled pain     Call MD for:  temperature >100.4     Diet general     Questions:    Total calories:      Fat restriction, if any:      Protein restriction, if any:      Na restriction, if any:      Fluid restriction:      Additional restrictions:      Keep surgical extremity elevated     No driving, operating heavy equipment or signing legal documents while taking pain medication     Remove dressing in 72 hours     Comments:    Leave gauze on skin in place during dressing change.  May shower in 72 hrs and get incision wet and redress with ace wrap    Shower on day dressing removed (No bath)         Primary Diagnosis     Your primary diagnosis was:  Closed Supracondylar Fracture Of Humerus Without Intercondylar Fracture      Admission Information     Date & Time Provider Department CSN    1/10/2017  6:59 AM Patrick Montana MD [facility] [id number]      Care Providers     Provider Role Specialty Primary office phone    Patrick Montana MD Attending Provider Orthopedic Surgery [phone]    Patrick Montana MD Surgeon  Orthopedic Surgery [phone]      Your Vitals Were     BP Pulse Temp Resp Height Weight    127/72 70 97.4 °F (36.3 °C) (Oral) 12 5' 8" (1.727 m) 75.8 kg (167 lb)    SpO2 BMI             97% 25.39 kg/m2         Recent Lab Values     No lab values to display.      Allergies as of 1/10/2017        Reactions    Xarelto [Rivaroxaban] Anaphylaxis      [facility]     [facility] Care [facility] - 24/7 Assistance  Unless otherwise directed by your provider, please contact [facility], our nurse care line that is available for 24/7 assistance.     Registered nurses in the [facility] provide clinical advisement, health education, appointment booking, " and other advisory services.  Call for this free service at [phone].        Advance Directives     An advance directive is a document which, in the event you are no longer able to make decisions for yourself, tells your healthcare team what kind of treatment you do or do not want to receive, or who you would like to make those decisions for you.  If you do not currently have an advance directive, [facility] encourages you to create one.  For more information call:  [phone]), [phone] ([phone]),  or log on to [email or web address].        Language Assistance Services     ATTENTION: Language assistance services are available, free of charge. Please call [phone].      ATENCIÓN: Si habla celina, tiene a kincaid disposición servicios gratuitos de asistencia lingüística. Llame al [phone].     CHÚ Ý: N?u b?n nói Ti?ng Vi?t, có các d?ch v? h? tr? ngôn ng? mi?n phí dành cho b?n. G?i s? [phone].        Eliquis Informaiton       Apixaban Oral tablet  What is this medicine?  APIXABAN (a PIX a ban) is an anticoagulant (blood thinner). It is used to lower the chance of stroke in people with a medical condition called atrial fibrillation. It is also used to treat or prevent blood clots in the lungs or in the veins.  This medicine may be used for other purposes; ask your health care provider or pharmacist if you have questions.  What should I tell my health care provider before I take this medicine?  They need to know if you have any of these conditions:  · bleeding disorders  · bleeding in the brain  · blood in your stools (black or tarry stools) or if you have blood in your vomit  · history of stomach bleeding  · kidney disease  · liver disease  · mechanical heart valve  · an unusual or allergic reaction to apixaban, other medicines, foods, dyes, or preservatives  · pregnant or trying to get pregnant  · breast-feeding  How should I use this medicine?  Take this medicine by mouth  with a glass of water. Follow the directions on the prescription label. You can take it with or without food. If it upsets your stomach, take it with food. Take your medicine at regular intervals. Do not take it more often than directed. Do not stop taking except on your doctor's advice. Stopping this medicine may increase your risk of a blot clot. Be sure to refill your prescription before you run out of medicine.  Talk to your pediatrician regarding the use of this medicine in children. Special care may be needed.  Overdosage: If you think you have taken too much of this medicine contact a poison control center or emergency room at once.  NOTE: This medicine is only for you. Do not share this medicine with others.  What if I miss a dose?  If you miss a dose, take it as soon as you can. If it is almost time for your next dose, take only that dose. Do not take double or extra doses.  What may interact with this medicine?  This medicine may interact with the following:  · aspirin and aspirin-like medicines  · certain medicines for fungal infections like ketoconazole and itraconazole  · certain medicines for seizures like carbamazepine and phenytoin  · certain medicines that treat or prevent blood clots like warfarin, enoxaparin, and dalteparin  · clarithromycin  · NSAIDs, medicines for pain and inflammation, like ibuprofen or naproxen  · rifampin  · ritonavir  · Emilie's wort  This list may not describe all possible interactions. Give your health care provider a list of all the medicines, herbs, non-prescription drugs, or dietary supplements you use. Also tell them if you smoke, drink alcohol, or use illegal drugs. Some items may interact with your medicine.  What should I watch for while using this medicine?  Notify your doctor or health care professional and seek emergency treatment if you develop breathing problems; changes in vision; chest pain; severe, sudden headache; pain, swelling, warmth in the leg;  trouble speaking; sudden numbness or weakness of the face, arm, or leg. These can be signs that your condition has gotten worse.  If you are going to have surgery, tell your doctor or health care professional that you are taking this medicine.  Tell your health care professional that you use this medicine before you have a spinal or epidural procedure. Sometimes people who take this medicine have bleeding problems around the spine when they have a spinal or epidural procedure. This bleeding is very rare. If you have a spinal or epidural procedure while on this medicine, call your health care professional immediately if you have back pain, numbness or tingling (especially in your legs and feet), muscle weakness, paralysis, or loss of bladder or bowel control.  Avoid sports and activities that might cause injury while you are using this medicine. Severe falls or injuries can cause unseen bleeding. Be careful when using sharp tools or knives. Consider using an electric razor. Take special care brushing or flossing your teeth. Report any injuries, bruising, or red spots on the skin to your doctor or health care professional.  What side effects may I notice from receiving this medicine?  Side effects that you should report to your doctor or health care professional as soon as possible:  · allergic reactions like skin rash, itching or hives, swelling of the face, lips, or tongue  · signs and symptoms of bleeding such as bloody or black, tarry stools; red or dark-brown urine; spitting up blood or brown material that looks like coffee grounds; red spots on the skin; unusual bruising or bleeding from the eye, gums, or nose  This list may not describe all possible side effects. Call your doctor for medical advice about side effects. You may report side effects to FDA at 8-106-FDA-1763.  Where should I keep my medicine?  Keep out of the reach of children.  Store at room temperature between 20 and 25 degrees C (68 and 77  degrees F). Throw away any unused medicine after the expiration date.  NOTE: This sheet is a summary. It may not cover all possible information. If you have questions about this medicine, talk to your doctor, pharmacist, or health care provider.  NOTE:This sheet is a summary. It may not cover all possible information. If you have questions about this medicine, talk to your doctor, pharmacist, or health care provider. Copyright© 2016 [employer]               [facility] complies with applicable Federal civil rights laws and does not discriminate on the basis of race, color, national origin, age, disability, or sex.

## 2017-01-10 NOTE — DISCHARGE SUMMARY
[facility]  Brief Operative Note     SUMMARY     Surgery Date: 1/10/2017     Surgeon(s) and Role:     * Patrick Montana MD - Primary    Assisting Surgeon: None    Pre-op Diagnosis:  Closed displaced simple supracondylar fracture of humerus without intercondylar fracture with routine healing [S42.413D]    Post-op Diagnosis:  Post-Op Diagnosis Codes:     * Closed displaced simple supracondylar fracture of humerus without intercondylar fracture with routine healing [S42.413D]    Procedure(s) (LRB):  OPEN REDUCTION INTERNAL FIXATION-HUMERUS (Right)    Anesthesia: Choice    Description of the findings of the procedure: Fracture displaced medially    Findings/Key Components: Medial and lateral plates    Estimated Blood Loss: * No values recorded between 1/10/2017  9:45 AM and 1/10/2017 11:07 AM *         Specimens:   Specimen     None          Discharge Note    SUMMARY     Admit Date: 1/10/2017    Discharge Date and Time: 1/10/2017 11:08 AM    Hospital Course (synopsis of major diagnoses, care, treatment, and services provided during the course of the hospital stay): Surgery, followed by recovery in PACU and discharge hjome from OPS stable     Final Diagnosis: Post-Op Diagnosis Codes:     * Closed displaced simple supracondylar fracture of humerus without intercondylar fracture with routine healing [S42.413D]    Disposition: Home or Self Care    Follow Up/Patient Instructions:     Medications:  Reconciled Home Medications:   Current Discharge Medication List      START taking these medications    Details   arm brace Misc 1 Device by Misc.(Non-Drug; Combo Route) route continuous.    Comments: Arm sling to rt arm      hydrocodone-acetaminophen 5-325mg (NORCO) 5-325 mg per tablet Take 1-2 tablets by mouth every 4 (four) hours as needed for Pain.  Qty: 60 tablet, Refills: 0         CONTINUE these medications which have NOT CHANGED    Details   levothyroxine (SYNTHROID) 50 MCG tablet Take 50 mcg by mouth  once daily.      lisinopril (PRINIVIL,ZESTRIL) 20 MG tablet Take 20 mg by mouth once daily.      metoprolol succinate (TOPROL-XL) 25 MG 24 hr tablet Take 25 mg by mouth once daily.      tramadol (ULTRAM) 50 mg tablet Take 1 tablet (50 mg total) by mouth every 6 (six) hours as needed for Pain.  Qty: 100 tablet, Refills: 3    Associated Diagnoses: Groin strain, right, initial encounter      allopurinol (ZYLOPRIM) 300 MG tablet Take 300 mg by mouth once daily.      apixaban (ELIQUIS) 5 mg Tab Take 1 tablet (5 mg total) by mouth 2 (two) times daily.  Qty: 180 tablet, Refills: 2      furosemide (LASIX) 20 MG tablet Take 20 mg by mouth 2 (two) times daily.      ondansetron (ZOFRAN-ODT) 4 MG TbDL Take 1 tablet (4 mg total) by mouth every 6 (six) hours as needed.  Qty: 12 tablet, Refills: 1    Associated Diagnoses: Gastroenteritis      potassium chloride SA (K-DUR,KLOR-CON) 10 MEQ tablet Take 10 mEq by mouth 2 (two) times daily.      simvastatin (ZOCOR) 20 MG tablet Take 20 mg by mouth every evening.             Discharge Procedure Orders  Diet general     Call MD for:  temperature >100.4     Call MD for:  persistent nausea and vomiting     Call MD for:  severe uncontrolled pain     Call MD for:  difficulty breathing, headache or visual disturbances     Call MD for:  redness, tenderness, or signs of infection (pain, swelling, redness, odor or green/yellow discharge around incision site)     Call MD for:  hives     Call MD for:  persistent dizziness or light-headedness     Call MD for:  extreme fatigue     Shower on day dressing removed (No bath)     Keep surgical extremity elevated     Ice to affected area     No driving, operating heavy equipment or signing legal documents while taking pain medication     Remove dressing in 72 hours   Order Comments: Leave gauze on skin in place during dressing change.  May shower in 72 hrs and get incision wet and redress with ace wrap       Follow-up Information     Follow up with  Patrick Montana MD In 2 weeks.    Specialty:  Orthopedic Surgery    Why:  For suture removal, For wound re-check    Contact information:    [address] [place] [address] [phone]

## 2017-01-10 NOTE — ANESTHESIA RELEASE NOTE
"Anesthesia Release from PACU Note    Patient: Danyel Rangel    Procedure(s) Performed: Procedure(s) (LRB):  OPEN REDUCTION INTERNAL FIXATION-HUMERUS (Right)    Anesthesia type: general    Post pain: Adequate analgesia    Post assessment: no apparent anesthetic complications, tolerated procedure well and no evidence of recall    Last Vitals:   Visit Vitals    /74    Pulse 70    Temp 36 °C (96.8 °F) (Oral)    Resp 18    Ht 5' 8" (1.727 m)    Wt 75.8 kg (167 lb)    SpO2 99%    BMI 25.39 kg/m2       Post vital signs: stable    Level of consciousness: awake, alert  and oriented    Nausea/Vomiting: no nausea/no vomiting    Complications: none    Airway Patency: patent    Respiratory: unassisted, spontaneous ventilation, room air    Cardiovascular: stable and blood pressure at baseline    Hydration: euvolemic  "

## 2017-01-11 RX ORDER — SIMVASTATIN 20 MG/1
20 TABLET, FILM COATED ORAL NIGHTLY
Qty: 90 TABLET | Refills: 2 | Status: SHIPPED | OUTPATIENT
Start: 2017-01-11 | End: 2017-04-27 | Stop reason: SDUPTHER

## 2017-01-11 RX ORDER — METOPROLOL SUCCINATE 25 MG/1
25 TABLET, EXTENDED RELEASE ORAL DAILY
Qty: 30 TABLET | Refills: 2 | Status: SHIPPED | OUTPATIENT
Start: 2017-01-11 | End: 2017-04-27 | Stop reason: SDUPTHER

## 2017-01-11 RX ORDER — POTASSIUM CHLORIDE 750 MG/1
10 TABLET, EXTENDED RELEASE ORAL 2 TIMES DAILY
Qty: 180 TABLET | Refills: 3 | Status: SHIPPED | OUTPATIENT
Start: 2017-01-11 | End: 2017-01-27 | Stop reason: SDUPTHER

## 2017-01-11 RX ORDER — LISINOPRIL 20 MG/1
20 TABLET ORAL DAILY
Qty: 90 TABLET | Refills: 2 | Status: SHIPPED | OUTPATIENT
Start: 2017-01-11 | End: 2017-04-27 | Stop reason: SDUPTHER

## 2017-01-11 RX ORDER — FUROSEMIDE 20 MG/1
20 TABLET ORAL 2 TIMES DAILY
Qty: 180 TABLET | Refills: 3 | Status: SHIPPED | OUTPATIENT
Start: 2017-01-11 | End: 2017-04-27 | Stop reason: SDUPTHER

## 2017-01-26 NOTE — TELEPHONE ENCOUNTER
----- Message from Adilson Shirley sent at 1/26/2017  2:41 PM CST -----  Contact: PATIENT [phone]  Patient needs rx refill on his blood thinners and something else, but not sure of the name? Please call.      [facility]?

## 2017-01-27 ENCOUNTER — OFFICE VISIT (OUTPATIENT)
Dept: CARDIOLOGY | Facility: CLINIC | Age: 82
End: 2017-01-27
Payer: MEDICARE

## 2017-01-27 VITALS — SYSTOLIC BLOOD PRESSURE: 95 MMHG | DIASTOLIC BLOOD PRESSURE: 60 MMHG | HEART RATE: 70 BPM

## 2017-01-27 DIAGNOSIS — I48.20 CHRONIC ATRIAL FIBRILLATION: ICD-10-CM

## 2017-01-27 DIAGNOSIS — Z95.810 AICD (AUTOMATIC CARDIOVERTER/DEFIBRILLATOR) PRESENT: ICD-10-CM

## 2017-01-27 DIAGNOSIS — I10 HTN (HYPERTENSION), BENIGN: ICD-10-CM

## 2017-01-27 DIAGNOSIS — I25.10 CORONARY ARTERY DISEASE INVOLVING NATIVE CORONARY ARTERY OF NATIVE HEART WITHOUT ANGINA PECTORIS: Primary | ICD-10-CM

## 2017-01-27 DIAGNOSIS — Z95.1 S/P CABG (CORONARY ARTERY BYPASS GRAFT): ICD-10-CM

## 2017-01-27 DIAGNOSIS — E78.2 MIXED HYPERLIPIDEMIA: ICD-10-CM

## 2017-01-27 PROCEDURE — 3074F SYST BP LT 130 MM HG: CPT | Mod: S$GLB,,, | Performed by: INTERNAL MEDICINE

## 2017-01-27 PROCEDURE — 1157F ADVNC CARE PLAN IN RCRD: CPT | Mod: S$GLB,,, | Performed by: INTERNAL MEDICINE

## 2017-01-27 PROCEDURE — 99214 OFFICE O/P EST MOD 30 MIN: CPT | Mod: S$GLB,,, | Performed by: INTERNAL MEDICINE

## 2017-01-27 PROCEDURE — 1159F MED LIST DOCD IN RCRD: CPT | Mod: S$GLB,,, | Performed by: INTERNAL MEDICINE

## 2017-01-27 PROCEDURE — 1160F RVW MEDS BY RX/DR IN RCRD: CPT | Mod: S$GLB,,, | Performed by: INTERNAL MEDICINE

## 2017-01-27 PROCEDURE — 3078F DIAST BP <80 MM HG: CPT | Mod: S$GLB,,, | Performed by: INTERNAL MEDICINE

## 2017-01-27 RX ORDER — POTASSIUM CHLORIDE 750 MG/1
10 TABLET, EXTENDED RELEASE ORAL DAILY
Qty: 90 TABLET | Refills: 3 | Status: SHIPPED | OUTPATIENT
Start: 2017-01-27 | End: 2017-04-27

## 2017-01-27 NOTE — PROGRESS NOTES
Subjective:   Patient ID:  Danyel Rangel is a 81 y.o. male who presents for follow-up of No chief complaint on file.      Problem List Items Addressed This Visit        Cardiac    Chronic atrial fibrillation    HTN (hypertension), benign    Coronary artery disease involving native coronary artery of native heart without angina pectoris - Primary       Fluids/Electrolytes/Nutrition/GI    HLD (hyperlipidemia)       Other    AICD (automatic cardioverter/defibrillator) present    S/P CABG (coronary artery bypass graft)          HPI: Patient is doing well with no chest pain or SOB. BP is controlled. No palpitations.  No neurological complaints. He is back on eliquis twice daily with no complaints of bleeding.     Ef normal.     Review of Systems   Constitution: Negative.   HENT: Negative.    Eyes: Negative.    Cardiovascular: Negative.    Respiratory: Negative.    Endocrine: Negative.    Hematologic/Lymphatic: Negative.    Skin: Negative.    Musculoskeletal: Negative.    Gastrointestinal: Negative.    Neurological: Negative.        Patient's Medications   New Prescriptions    No medications on file   Previous Medications    ALLOPURINOL (ZYLOPRIM) 300 MG TABLET    Take 300 mg by mouth once daily.    APIXABAN (ELIQUIS) 5 MG TAB    Take 1 tablet (5 mg total) by mouth 2 (two) times daily.    ARM BRACE MISC    1 Device by Misc.(Non-Drug; Combo Route) route continuous.    FUROSEMIDE (LASIX) 20 MG TABLET    Take 1 tablet (20 mg total) by mouth 2 (two) times daily.    HYDROCODONE-ACETAMINOPHEN 5-325MG (NORCO) 5-325 MG PER TABLET    Take 1-2 tablets by mouth every 4 (four) hours as needed for Pain.    LEVOTHYROXINE (SYNTHROID) 50 MCG TABLET    Take 50 mcg by mouth once daily.    LISINOPRIL (PRINIVIL,ZESTRIL) 20 MG TABLET    Take 1 tablet (20 mg total) by mouth once daily.    METOPROLOL SUCCINATE (TOPROL-XL) 25 MG 24 HR TABLET    Take 1 tablet (25 mg total) by mouth once daily.    ONDANSETRON (ZOFRAN-ODT) 4 MG TBDL    Take 1  tablet (4 mg total) by mouth every 6 (six) hours as needed.    POTASSIUM CHLORIDE SA (K-DUR,KLOR-CON) 10 MEQ TABLET    Take 1 tablet (10 mEq total) by mouth 2 (two) times daily.    SIMVASTATIN (ZOCOR) 20 MG TABLET    Take 1 tablet (20 mg total) by mouth every evening.   Modified Medications    No medications on file   Discontinued Medications    No medications on file       Objective:   Physical Exam   Constitutional: He is oriented to person, place, and time. He appears well-developed and well-nourished. No distress.   Examination of the digits showed no clubbing or cyanosis   HENT:   Head: Normocephalic and atraumatic.   Eyes: Conjunctivae are normal. Pupils are equal, round, and reactive to light. Right eye exhibits no discharge.   Neck: Normal range of motion. Neck supple. No JVD present. No thyromegaly present.   No carotid bruits   Cardiovascular: Normal rate, regular rhythm, S1 normal, S2 normal, normal heart sounds, intact distal pulses and normal pulses.  PMI is not displaced.  Exam reveals no gallop, no friction rub and no opening snap.    No murmur heard.  Pulmonary/Chest: Effort normal and breath sounds normal. No respiratory distress. He has no wheezes. He has no rales. He exhibits no tenderness.   Abdominal: Soft. Bowel sounds are normal. He exhibits no distension and no mass. There is no tenderness. There is no guarding.   No hepatosplenomegaly   Musculoskeletal: Normal range of motion. He exhibits no edema or tenderness.   Lymphadenopathy:     He has no cervical adenopathy.   Neurological: He is alert and oriented to person, place, and time.   Skin: Skin is warm. No rash noted. He is not diaphoretic. No erythema.   Psychiatric: He has a normal mood and affect.   Nursing note and vitals reviewed.      ECGs reviewed-ventricular paced rhythm  LABS reviewed  Imaging including Echoes reviewed    Assessment:     1. Coronary artery disease involving native coronary artery of native heart without angina  pectoris    2. Chronic atrial fibrillation    3. HTN (hypertension), benign    4. Mixed hyperlipidemia    5. AICD (automatic cardioverter/defibrillator) present    6. S/P CABG (coronary artery bypass graft)        Plan:     Continue current medications  Low salt diet  Activity as tolerated  F/u in 6 months

## 2017-01-27 NOTE — MR AVS SNAPSHOT
[facility]  [address] [place] [address]  Phone: [phone]  Fax: [phone]                  Danyel Rangel   2017 8:45 AM   Office Visit    Description:  Male : 1935   Provider:  Emma Melgar MD   Department:  [facility] - Cardiology           Diagnoses this Visit        Comments    Coronary artery disease involving native coronary artery of native heart without angina pectoris    -  Primary     Chronic atrial fibrillation         HTN (hypertension), benign         Mixed hyperlipidemia         AICD (automatic cardioverter/defibrillator) present         S/P CABG (coronary artery bypass graft)                To Do List           Goals (5 Years of Data)     None       These Medications        Disp Refills Start End    potassium chloride SA (K-DUR,KLOR-CON) 10 MEQ tablet 90 tablet 3 2017    Take 1 tablet (10 mEq total) by mouth once daily. - Oral    Pharmacy: [facility] - [address] Ph #: [phone]         [facility] On Call     [facility] On Call Nurse Care Line -  Assistance  Registered nurses in the [facility] On Call Center provide clinical advisement, health education, appointment booking, and other advisory services.  Call for this free service at [phone].             Medications           Message regarding Medications     Verify the changes and/or additions to your medication regime listed below are the same as discussed with your clinician today.  If any of these changes or additions are incorrect, please notify your healthcare provider.        CHANGE how you are taking these medications     Start Taking Instead of    potassium chloride SA (K-DUR,KLOR-CON) 10 MEQ tablet potassium chloride SA (K-DUR,KLOR-CON) 10 MEQ tablet    Dosage:  Take 1 tablet (10 mEq total) by mouth once daily. Dosage:  Take 1 tablet (10 mEq total) by mouth 2 (two) times daily.    Reason for Change:  Reorder             Verify that the below list of medications is an accurate representation of the medications you are currently taking.  If none reported, the list may be blank. If incorrect, please contact your healthcare provider. Carry this list with you in case of emergency.           Current Medications     allopurinol (ZYLOPRIM) 300 MG tablet Take 300 mg by mouth once daily.    apixaban (ELIQUIS) 5 mg Tab Take 1 tablet (5 mg total) by mouth 2 (two) times daily.    arm brace Misc 1 Device by Misc.(Non-Drug; Combo Route) route continuous.    furosemide (LASIX) 20 MG tablet Take 1 tablet (20 mg total) by mouth 2 (two) times daily.    hydrocodone-acetaminophen 5-325mg (NORCO) 5-325 mg per tablet Take 1-2 tablets by mouth every 4 (four) hours as needed for Pain.    levothyroxine (SYNTHROID) 50 MCG tablet Take 50 mcg by mouth once daily.    lisinopril (PRINIVIL,ZESTRIL) 20 MG tablet Take 1 tablet (20 mg total) by mouth once daily.    metoprolol succinate (TOPROL-XL) 25 MG 24 hr tablet Take 1 tablet (25 mg total) by mouth once daily.    ondansetron (ZOFRAN-ODT) 4 MG TbDL Take 1 tablet (4 mg total) by mouth every 6 (six) hours as needed.    potassium chloride SA (K-DUR,KLOR-CON) 10 MEQ tablet Take 1 tablet (10 mEq total) by mouth once daily.    simvastatin (ZOCOR) 20 MG tablet Take 1 tablet (20 mg total) by mouth every evening.           Clinical Reference Information           Vital Signs - Last Recorded  Most recent update: 1/27/2017  9:10 AM by Emma Melgar MD    BP Pulse                95/60 70          Blood Pressure          Most Recent Value    BP  95/60      Allergies as of 1/27/2017     Xarelto [Rivaroxaban]      Immunizations Administered on Date of Encounter - 1/27/2017     None

## 2017-02-13 RX ORDER — LEVOTHYROXINE SODIUM 50 UG/1
50 TABLET ORAL DAILY
Qty: 90 TABLET | Refills: 3 | Status: SHIPPED | OUTPATIENT
Start: 2017-02-13 | End: 2018-01-23 | Stop reason: SDUPTHER

## 2017-02-13 RX ORDER — ALLOPURINOL 300 MG/1
300 TABLET ORAL DAILY
Qty: 90 TABLET | Refills: 3 | Status: SHIPPED | OUTPATIENT
Start: 2017-02-13 | End: 2018-01-23 | Stop reason: SDUPTHER

## 2017-02-13 NOTE — TELEPHONE ENCOUNTER
----- Message from Tawny Alonso sent at 2/13/2017  1:35 PM CST -----  Requesting refill for Allopurinal 300 mg tab 1 po daily and Levothyroxine 50 mcg 1 po daily. Please send to [facility] pharmacy.

## 2017-04-27 RX ORDER — FUROSEMIDE 20 MG/1
20 TABLET ORAL 2 TIMES DAILY
Qty: 180 TABLET | Refills: 3 | Status: SHIPPED | OUTPATIENT
Start: 2017-04-27 | End: 2018-01-23 | Stop reason: SDUPTHER

## 2017-04-27 RX ORDER — METOPROLOL SUCCINATE 25 MG/1
25 TABLET, EXTENDED RELEASE ORAL DAILY
Qty: 30 TABLET | Refills: 2 | Status: SHIPPED | OUTPATIENT
Start: 2017-04-27 | End: 2017-07-05 | Stop reason: SDUPTHER

## 2017-04-27 RX ORDER — SIMVASTATIN 20 MG/1
20 TABLET, FILM COATED ORAL NIGHTLY
Qty: 90 TABLET | Refills: 3 | Status: SHIPPED | OUTPATIENT
Start: 2017-04-27 | End: 2018-01-23 | Stop reason: SDUPTHER

## 2017-04-27 RX ORDER — LISINOPRIL 20 MG/1
20 TABLET ORAL DAILY
Qty: 90 TABLET | Refills: 3 | Status: SHIPPED | OUTPATIENT
Start: 2017-04-27 | End: 2018-01-23 | Stop reason: SDUPTHER

## 2017-05-29 ENCOUNTER — OFFICE VISIT (OUTPATIENT)
Dept: CARDIOLOGY | Facility: CLINIC | Age: 82
End: 2017-05-29
Payer: MEDICARE

## 2017-05-29 VITALS
WEIGHT: 158.63 LBS | BODY MASS INDEX: 24.04 KG/M2 | HEIGHT: 68 IN | DIASTOLIC BLOOD PRESSURE: 60 MMHG | SYSTOLIC BLOOD PRESSURE: 96 MMHG | OXYGEN SATURATION: 97 % | HEART RATE: 70 BPM

## 2017-05-29 DIAGNOSIS — I25.10 CORONARY ARTERY DISEASE INVOLVING NATIVE CORONARY ARTERY OF NATIVE HEART WITHOUT ANGINA PECTORIS: ICD-10-CM

## 2017-05-29 DIAGNOSIS — I10 HTN (HYPERTENSION), BENIGN: ICD-10-CM

## 2017-05-29 DIAGNOSIS — Z95.810 AICD (AUTOMATIC CARDIOVERTER/DEFIBRILLATOR) PRESENT: ICD-10-CM

## 2017-05-29 DIAGNOSIS — E78.2 MIXED HYPERLIPIDEMIA: ICD-10-CM

## 2017-05-29 DIAGNOSIS — Z95.1 S/P CABG (CORONARY ARTERY BYPASS GRAFT): ICD-10-CM

## 2017-05-29 DIAGNOSIS — I48.20 CHRONIC ATRIAL FIBRILLATION: Primary | ICD-10-CM

## 2017-05-29 PROCEDURE — 1159F MED LIST DOCD IN RCRD: CPT | Mod: S$GLB,,, | Performed by: INTERNAL MEDICINE

## 2017-05-29 PROCEDURE — 99214 OFFICE O/P EST MOD 30 MIN: CPT | Mod: S$GLB,,, | Performed by: INTERNAL MEDICINE

## 2017-05-29 PROCEDURE — 99999 PR PBB SHADOW E&M-EST. PATIENT-LVL III: CPT | Mod: PBBFAC,,, | Performed by: INTERNAL MEDICINE

## 2017-05-29 PROCEDURE — 1126F AMNT PAIN NOTED NONE PRSNT: CPT | Mod: S$GLB,,, | Performed by: INTERNAL MEDICINE

## 2017-05-29 NOTE — PROGRESS NOTES
Subjective:   Patient ID:  Danyel Rangel is a 82 y.o. male who presents for follow-up of Follow-up      Problem List Items Addressed This Visit        Cardiac    Chronic atrial fibrillation - Primary    HTN (hypertension), benign    Coronary artery disease involving native coronary artery of native heart without angina pectoris       Fluids/Electrolytes/Nutrition/GI    HLD (hyperlipidemia)       Other    AICD (automatic cardioverter/defibrillator) present    S/P CABG (coronary artery bypass graft)      Other Visit Diagnoses    None.         HPI: Patient is doing well with no chest pain or SOB. BP is controlled. HR is controlled with no palpitations.    No neurological complaints. No bleeding on eliquis. No shocks by AICD.     Ef normal.     Review of Systems   Constitution: Negative.   HENT: Negative.    Eyes: Negative.    Cardiovascular: Negative.    Respiratory: Negative.    Endocrine: Negative.    Hematologic/Lymphatic: Negative.    Skin: Negative.    Musculoskeletal: Negative.    Gastrointestinal: Negative.    Neurological: Negative.        Patient's Medications   New Prescriptions    No medications on file   Previous Medications    ALLOPURINOL (ZYLOPRIM) 300 MG TABLET    Take 1 tablet (300 mg total) by mouth once daily.    APIXABAN (ELIQUIS) 5 MG TAB    Take 1 tablet (5 mg total) by mouth 2 (two) times daily.    ARM BRACE MISC    1 Device by Misc.(Non-Drug; Combo Route) route continuous.    FUROSEMIDE (LASIX) 20 MG TABLET    Take 1 tablet (20 mg total) by mouth 2 (two) times daily.    HYDROCODONE-ACETAMINOPHEN 5-325MG (NORCO) 5-325 MG PER TABLET    Take 1-2 tablets by mouth every 4 (four) hours as needed for Pain.    LEVOTHYROXINE (SYNTHROID) 50 MCG TABLET    Take 1 tablet (50 mcg total) by mouth once daily.    LISINOPRIL (PRINIVIL,ZESTRIL) 20 MG TABLET    Take 1 tablet (20 mg total) by mouth once daily.    METOPROLOL SUCCINATE (TOPROL-XL) 25 MG 24 HR TABLET    Take 1 tablet (25 mg total) by mouth once daily.     ONDANSETRON (ZOFRAN-ODT) 4 MG TBDL    Take 1 tablet (4 mg total) by mouth every 6 (six) hours as needed.    SIMVASTATIN (ZOCOR) 20 MG TABLET    Take 1 tablet (20 mg total) by mouth every evening.   Modified Medications    No medications on file   Discontinued Medications    No medications on file       Objective:   Physical Exam   Constitutional: He is oriented to person, place, and time. He appears well-developed and well-nourished. No distress.   Examination of the digits showed no clubbing or cyanosis   HENT:   Head: Normocephalic and atraumatic.   Eyes: Conjunctivae are normal. Pupils are equal, round, and reactive to light. Right eye exhibits no discharge.   Neck: Normal range of motion. Neck supple. No JVD present. No thyromegaly present.   No carotid bruits   Cardiovascular: Normal rate, regular rhythm, S1 normal, S2 normal, normal heart sounds, intact distal pulses and normal pulses.  PMI is not displaced.  Exam reveals no gallop, no friction rub and no opening snap.    No murmur heard.  Pulmonary/Chest: Effort normal and breath sounds normal. No respiratory distress. He has no wheezes. He has no rales. He exhibits no tenderness.   Abdominal: Soft. Bowel sounds are normal. He exhibits no distension and no mass. There is no tenderness. There is no guarding.   No hepatosplenomegaly   Musculoskeletal: Normal range of motion. He exhibits no edema or tenderness.   Lymphadenopathy:     He has no cervical adenopathy.   Neurological: He is alert and oriented to person, place, and time.   Skin: Skin is warm. No rash noted. He is not diaphoretic. No erythema.   Psychiatric: He has a normal mood and affect.   Nursing note and vitals reviewed.      ECGs reviewed-ventricular paced rhythm  LABS reviewed  Imaging including Echoes reviewed    Assessment:     1. Chronic atrial fibrillation    2. Coronary artery disease involving native coronary artery of native heart without angina pectoris    3. HTN (hypertension),  benign    4. Mixed hyperlipidemia    5. AICD (automatic cardioverter/defibrillator) present    6. S/P CABG (coronary artery bypass graft)        Plan:   AICD interrogation  Continue current medications  Low salt diet  Activity as tolerated  F/u in 6 months

## 2017-06-06 ENCOUNTER — CLINICAL SUPPORT (OUTPATIENT)
Dept: CARDIOLOGY | Facility: CLINIC | Age: 82
End: 2017-06-06
Payer: MEDICARE

## 2017-06-06 DIAGNOSIS — Z95.810 AICD (AUTOMATIC CARDIOVERTER/DEFIBRILLATOR) PRESENT: ICD-10-CM

## 2017-06-06 PROCEDURE — 93289 INTERROG DEVICE EVAL HEART: CPT | Mod: S$GLB,,, | Performed by: INTERNAL MEDICINE

## 2017-06-06 NOTE — PROGRESS NOTES
ICD Evaluation Report    June 6, 2017    Primary MD: Dr. Spence  Primary Cardiologist: Dr. Melgar  : Biotronik Model: Ilesto 7 DR-T DF-1/Serial No.  [id number]  Implant date: 3/12/2015  Reason for evaluation:routine  Indication for ICD: unknown    Measurements  Atrial sensing - P wave: 1.9 mV  Atrial capture: Maintained: n/a:  Afib V  Atrial lead impedance: 477 ohms  Ventricular sensing - R wave: CHB  Ventricular capture: RV Maintained: 0.4 V @ 0.4 ms   Ventricular lead impedance: RV:  463 ohms;   Shock Impedance:  79 ohms  Underlying rhythm: Complete Heart Block      Diagnostic Data  Atrial paced: 3 % Ventricular paced: 96 %  Mode switch: 100% Afib  Other: n/a  Ventricular Events: none  Battery status: satisfactory     VT Zones and Therapies:  VT-1 zone:  171 bpm to 213 bpm   Therapies: monitor  VF zone:  > 214 bpm   Therapies: Burst then 40J x 8    Final Parameters  Mode: DDDR Lower rate: 60 bpm Upper rate: 130 bpm  AV Delay: 200 ms Mode Switch: On Rate Response: CLS  Atrial - Amplitude: 2.5 V Pulse width: 0.4 ms Sensitivity: auto mV   Polarity: Bipolar  Ventricular - Amplitude: 3.0 V Pulse width: 0.4 ms Sensitivity: auto mV   Polarity: Bipolar  Changes made: none  Conclusions: normal device function    Follow up: 3 months         Rick Weldon MD, FACC, CCDS  Interventional Cardiology  [facility]

## 2017-07-06 RX ORDER — METOPROLOL SUCCINATE 25 MG/1
25 TABLET, EXTENDED RELEASE ORAL DAILY
Qty: 90 TABLET | Refills: 2 | Status: SHIPPED | OUTPATIENT
Start: 2017-07-06 | End: 2018-01-23 | Stop reason: SDUPTHER

## 2017-11-27 ENCOUNTER — OFFICE VISIT (OUTPATIENT)
Dept: CARDIOLOGY | Facility: CLINIC | Age: 82
End: 2017-11-27
Payer: MEDICARE

## 2017-11-27 VITALS
HEART RATE: 70 BPM | SYSTOLIC BLOOD PRESSURE: 91 MMHG | DIASTOLIC BLOOD PRESSURE: 50 MMHG | HEIGHT: 68 IN | WEIGHT: 160.69 LBS | OXYGEN SATURATION: 97 % | BODY MASS INDEX: 24.35 KG/M2

## 2017-11-27 DIAGNOSIS — Z95.810 AICD (AUTOMATIC CARDIOVERTER/DEFIBRILLATOR) PRESENT: Primary | ICD-10-CM

## 2017-11-27 DIAGNOSIS — I25.10 CORONARY ARTERY DISEASE INVOLVING NATIVE CORONARY ARTERY OF NATIVE HEART WITHOUT ANGINA PECTORIS: ICD-10-CM

## 2017-11-27 DIAGNOSIS — I48.20 CHRONIC ATRIAL FIBRILLATION: ICD-10-CM

## 2017-11-27 DIAGNOSIS — I10 HTN (HYPERTENSION), BENIGN: ICD-10-CM

## 2017-11-27 DIAGNOSIS — E78.2 MIXED HYPERLIPIDEMIA: ICD-10-CM

## 2017-11-27 DIAGNOSIS — Z95.1 S/P CABG (CORONARY ARTERY BYPASS GRAFT): ICD-10-CM

## 2017-11-27 PROCEDURE — 99214 OFFICE O/P EST MOD 30 MIN: CPT | Mod: S$GLB,,, | Performed by: INTERNAL MEDICINE

## 2017-11-27 PROCEDURE — 99999 PR PBB SHADOW E&M-EST. PATIENT-LVL III: CPT | Mod: PBBFAC,,, | Performed by: INTERNAL MEDICINE

## 2017-11-27 RX ORDER — POTASSIUM CHLORIDE 750 MG/1
TABLET, EXTENDED RELEASE ORAL
COMMUNITY
Start: 2017-10-24 | End: 2018-01-23 | Stop reason: SDUPTHER

## 2017-11-27 NOTE — PROGRESS NOTES
Subjective:   Patient ID:  Danyel Rangel is a 82 y.o. male who presents for follow-up of Follow-up      Problem List Items Addressed This Visit        Cardiac/Vascular    Chronic atrial fibrillation    HTN (hypertension), benign    HLD (hyperlipidemia)    AICD (automatic cardioverter/defibrillator) present - Primary    Coronary artery disease involving native coronary artery of native heart without angina pectoris    S/P CABG (coronary artery bypass graft)          HPI: Patient with PMH of CAD and CABG, AICD, HTN and HLD present for f/u. He is doing well with no chest pain or SOB. No orthopnea or PND. BP is controlled. HR is controlled with no palpitations.    No neurological complaints. No bleeding on eliquis. No shocks by AICD.       Review of Systems   Constitution: Negative.   HENT: Negative.    Eyes: Negative.    Cardiovascular: Negative.    Respiratory: Negative.    Endocrine: Negative.    Hematologic/Lymphatic: Negative.    Skin: Negative.    Musculoskeletal: Negative.    Gastrointestinal: Negative.    Neurological: Negative.        Patient's Medications   New Prescriptions    No medications on file   Previous Medications    ALLOPURINOL (ZYLOPRIM) 300 MG TABLET    Take 1 tablet (300 mg total) by mouth once daily.    APIXABAN (ELIQUIS) 5 MG TAB    Take 1 tablet (5 mg total) by mouth 2 (two) times daily.    ARM BRACE MISC    1 Device by Misc.(Non-Drug; Combo Route) route continuous.    FUROSEMIDE (LASIX) 20 MG TABLET    Take 1 tablet (20 mg total) by mouth 2 (two) times daily.    HYDROCODONE-ACETAMINOPHEN 5-325MG (NORCO) 5-325 MG PER TABLET    Take 1-2 tablets by mouth every 4 (four) hours as needed for Pain.    LEVOTHYROXINE (SYNTHROID) 50 MCG TABLET    Take 1 tablet (50 mcg total) by mouth once daily.    LISINOPRIL (PRINIVIL,ZESTRIL) 20 MG TABLET    Take 1 tablet (20 mg total) by mouth once daily.    METOPROLOL SUCCINATE (TOPROL-XL) 25 MG 24 HR TABLET    TAKE 1 TABLET (25 MG TOTAL) BY MOUTH ONCE DAILY.     ONDANSETRON (ZOFRAN-ODT) 4 MG TBDL    Take 1 tablet (4 mg total) by mouth every 6 (six) hours as needed.    POTASSIUM CHLORIDE SA (K-DUR,KLOR-CON) 10 MEQ TABLET        SIMVASTATIN (ZOCOR) 20 MG TABLET    Take 1 tablet (20 mg total) by mouth every evening.   Modified Medications    No medications on file   Discontinued Medications    No medications on file       Objective:   Physical Exam   Constitutional: He is oriented to person, place, and time. He appears well-developed and well-nourished. No distress.   Examination of the digits showed no clubbing or cyanosis   HENT:   Head: Normocephalic and atraumatic.   Eyes: Conjunctivae are normal. Pupils are equal, round, and reactive to light. Right eye exhibits no discharge.   Neck: Normal range of motion. Neck supple. No JVD present. No thyromegaly present.   No carotid bruits   Cardiovascular: Normal rate, regular rhythm, S1 normal, S2 normal, normal heart sounds, intact distal pulses and normal pulses.  PMI is not displaced.  Exam reveals no gallop, no friction rub and no opening snap.    No murmur heard.  Pulmonary/Chest: Effort normal and breath sounds normal. No respiratory distress. He has no wheezes. He has no rales. He exhibits no tenderness.   Abdominal: Soft. Bowel sounds are normal. He exhibits no distension and no mass. There is no tenderness. There is no guarding.   No hepatosplenomegaly   Musculoskeletal: Normal range of motion. He exhibits no edema or tenderness.   Lymphadenopathy:     He has no cervical adenopathy.   Neurological: He is alert and oriented to person, place, and time.   Skin: Skin is warm. No rash noted. He is not diaphoretic. No erythema.   Psychiatric: He has a normal mood and affect.   Nursing note and vitals reviewed.      ECGs reviewed-ventricular paced rhythm  LABS reviewed  Imaging including Echoes reviewed    Assessment:     1. AICD (automatic cardioverter/defibrillator) present    2. Coronary artery disease involving native  coronary artery of native heart without angina pectoris    3. S/P CABG (coronary artery bypass graft)    4. Mixed hyperlipidemia    5. HTN (hypertension), benign    6. Chronic atrial fibrillation        Plan:   AICD interrogation  Continue current medications  Low salt diet  Activity as tolerated  F/u in 6 months

## 2017-12-05 ENCOUNTER — CLINICAL SUPPORT (OUTPATIENT)
Dept: CARDIOLOGY | Facility: CLINIC | Age: 82
End: 2017-12-05
Payer: MEDICARE

## 2017-12-05 PROCEDURE — 93289 INTERROG DEVICE EVAL HEART: CPT | Mod: S$GLB,,, | Performed by: INTERNAL MEDICINE

## 2017-12-05 NOTE — PROGRESS NOTES
ICD Evaluation Report    December 5, 2017    Primary MD: Dr. Spence  Primary Cardiologist: Dr. Melgar  : Biotronik Model: Ilesto 7 DR-T DF-1/Serial No.  [id number]  Implant date: 3/12/2015  Reason for evaluation:routine  Indication for ICD: Ischemic Cardiomyopathy    Measurements  Atrial sensing - P wave: 3.0 mV  Atrial capture: Maintained: n/a:  Afib   Atrial lead impedance: 463 ohms  Ventricular sensing - R wave: CHB  Ventricular capture: RV Maintained: 1 V @ 0.4 ms   Ventricular lead impedance: RV:  448 ohms;   Shock Impedance:  79 ohms  Underlying rhythm: Complete Heart Block      Diagnostic Data  Atrial paced: 0 % Ventricular paced: 100 %  Mode switch: 100% Afib  Other: n/a  Ventricular Events: none  Battery status: satisfactory     VT Zones and Therapies:  VT-1 zone:  171 bpm to 213 bpm   Therapies: monitor  VF zone:  > 214 bpm   Therapies: Burst then 40J x 8    Final Parameters  Mode: DDDR Lower rate: 60 bpm Upper rate: 130 bpm  AV Delay: 200 ms Mode Switch: On Rate Response: CLS  Atrial - Amplitude: 2.5 V Pulse width: 0.4 ms Sensitivity: auto mV   Polarity: Bipolar  Ventricular - Amplitude: 3.0 V Pulse width: 0.4 ms Sensitivity: auto mV   Polarity: Bipolar  Changes made: none  Conclusions: normal device function    Follow up: Evaluate whether upgrade to CRT-D device indicated due to RV pacing > 40%        Rick Weldon MD, FACC, CCDS  Interventional Cardiology  [facility]

## 2018-01-23 RX ORDER — FUROSEMIDE 20 MG/1
20 TABLET ORAL 2 TIMES DAILY
Qty: 180 TABLET | Refills: 3 | Status: SHIPPED | OUTPATIENT
Start: 2018-01-23 | End: 2018-01-23 | Stop reason: SDUPTHER

## 2018-01-23 RX ORDER — METOPROLOL SUCCINATE 25 MG/1
25 TABLET, EXTENDED RELEASE ORAL DAILY
Qty: 90 TABLET | Refills: 2 | Status: SHIPPED | OUTPATIENT
Start: 2018-01-23 | End: 2018-08-27 | Stop reason: SDUPTHER

## 2018-01-23 RX ORDER — LEVOTHYROXINE SODIUM 50 UG/1
50 TABLET ORAL DAILY
Qty: 90 TABLET | Refills: 3 | Status: SHIPPED | OUTPATIENT
Start: 2018-01-23 | End: 2018-11-26 | Stop reason: SDUPTHER

## 2018-01-23 RX ORDER — LISINOPRIL 20 MG/1
20 TABLET ORAL DAILY
Qty: 90 TABLET | Refills: 3 | Status: SHIPPED | OUTPATIENT
Start: 2018-01-23 | End: 2018-10-25

## 2018-01-23 RX ORDER — POTASSIUM CHLORIDE 750 MG/1
10 TABLET, EXTENDED RELEASE ORAL DAILY
Qty: 90 TABLET | Refills: 3 | Status: SHIPPED | OUTPATIENT
Start: 2018-01-23 | End: 2018-01-23 | Stop reason: SDUPTHER

## 2018-01-23 RX ORDER — METOPROLOL SUCCINATE 25 MG/1
25 TABLET, EXTENDED RELEASE ORAL DAILY
Qty: 90 TABLET | Refills: 2 | Status: SHIPPED | OUTPATIENT
Start: 2018-01-23 | End: 2018-01-23 | Stop reason: SDUPTHER

## 2018-01-23 RX ORDER — FUROSEMIDE 20 MG/1
20 TABLET ORAL 2 TIMES DAILY
Qty: 180 TABLET | Refills: 3 | Status: SHIPPED | OUTPATIENT
Start: 2018-01-23 | End: 2018-11-26 | Stop reason: SDUPTHER

## 2018-01-23 RX ORDER — POTASSIUM CHLORIDE 750 MG/1
10 TABLET, EXTENDED RELEASE ORAL DAILY
Qty: 90 TABLET | Refills: 3 | Status: SHIPPED | OUTPATIENT
Start: 2018-01-23 | End: 2018-11-26 | Stop reason: SDUPTHER

## 2018-01-23 RX ORDER — SIMVASTATIN 20 MG/1
20 TABLET, FILM COATED ORAL NIGHTLY
Qty: 90 TABLET | Refills: 3 | Status: SHIPPED | OUTPATIENT
Start: 2018-01-23 | End: 2018-11-26 | Stop reason: SDUPTHER

## 2018-01-23 RX ORDER — SIMVASTATIN 20 MG/1
20 TABLET, FILM COATED ORAL NIGHTLY
Qty: 90 TABLET | Refills: 3 | Status: SHIPPED | OUTPATIENT
Start: 2018-01-23 | End: 2018-01-23 | Stop reason: SDUPTHER

## 2018-01-23 RX ORDER — ALLOPURINOL 300 MG/1
300 TABLET ORAL DAILY
Qty: 90 TABLET | Refills: 3 | Status: SHIPPED | OUTPATIENT
Start: 2018-01-23 | End: 2018-11-26 | Stop reason: SDUPTHER

## 2018-01-23 RX ORDER — LISINOPRIL 20 MG/1
20 TABLET ORAL DAILY
Qty: 90 TABLET | Refills: 3 | Status: SHIPPED | OUTPATIENT
Start: 2018-01-23 | End: 2018-01-23 | Stop reason: SDUPTHER

## 2018-01-23 NOTE — TELEPHONE ENCOUNTER
----- Message from Deshawn Tiwari sent at 1/23/2018  9:32 AM CST -----  Contact: pt   PT REQUESTING REILL  NAME: levothyroxine  STRENGTH:50 MCG  DIRECTIONS: take one table by mouth daily  PHARMACY NAME:[facility]  PHARMACY PHONE:[phone] ( phone )     PT REQUESTING REILL  NAME:allopurinol  STRENGTH:300 MG  DIRECTIONS:take one tablet by mouth daily  PHARMACY NAME:[facility]  PHARMACY PHONE:[phone] ( phone )     Pt came in the office requesting refills on  All of his medications.  I tried to explain to him these two ( Dr. Spence ) don't need to be refilled until Feb but I don't think he understood so I told him I would send a request for these two as well.

## 2018-04-25 ENCOUNTER — OFFICE VISIT (OUTPATIENT)
Dept: INTERNAL MEDICINE | Facility: CLINIC | Age: 83
End: 2018-04-25
Payer: MEDICARE

## 2018-04-25 VITALS
SYSTOLIC BLOOD PRESSURE: 126 MMHG | BODY MASS INDEX: 25.23 KG/M2 | TEMPERATURE: 97 F | HEART RATE: 80 BPM | DIASTOLIC BLOOD PRESSURE: 78 MMHG | HEIGHT: 68 IN | WEIGHT: 166.44 LBS | RESPIRATION RATE: 20 BRPM

## 2018-04-25 DIAGNOSIS — D69.2 SENILE PURPURA: ICD-10-CM

## 2018-04-25 DIAGNOSIS — I10 HTN (HYPERTENSION), BENIGN: ICD-10-CM

## 2018-04-25 DIAGNOSIS — E78.5 HYPERLIPIDEMIA, UNSPECIFIED HYPERLIPIDEMIA TYPE: ICD-10-CM

## 2018-04-25 DIAGNOSIS — N40.1 BENIGN PROSTATIC HYPERPLASIA WITH WEAK URINARY STREAM: ICD-10-CM

## 2018-04-25 DIAGNOSIS — R39.12 BENIGN PROSTATIC HYPERPLASIA WITH WEAK URINARY STREAM: ICD-10-CM

## 2018-04-25 DIAGNOSIS — I48.20 CHRONIC ATRIAL FIBRILLATION: ICD-10-CM

## 2018-04-25 DIAGNOSIS — I25.10 CORONARY ARTERY DISEASE INVOLVING NATIVE CORONARY ARTERY OF NATIVE HEART WITHOUT ANGINA PECTORIS: Primary | ICD-10-CM

## 2018-04-25 DIAGNOSIS — R81 GLUCOSURIA: ICD-10-CM

## 2018-04-25 DIAGNOSIS — E03.9 HYPOTHYROIDISM, UNSPECIFIED TYPE: ICD-10-CM

## 2018-04-25 DIAGNOSIS — R79.9 ABNORMAL FINDING OF BLOOD CHEMISTRY: ICD-10-CM

## 2018-04-25 DIAGNOSIS — Z95.810 AICD (AUTOMATIC CARDIOVERTER/DEFIBRILLATOR) PRESENT: ICD-10-CM

## 2018-04-25 PROBLEM — S42.413A: Status: RESOLVED | Noted: 2017-01-10 | Resolved: 2018-04-25

## 2018-04-25 PROCEDURE — 3078F DIAST BP <80 MM HG: CPT | Mod: CPTII,S$GLB,, | Performed by: INTERNAL MEDICINE

## 2018-04-25 PROCEDURE — 99999 PR PBB SHADOW E&M-EST. PATIENT-LVL III: CPT | Mod: PBBFAC,,, | Performed by: INTERNAL MEDICINE

## 2018-04-25 PROCEDURE — 99214 OFFICE O/P EST MOD 30 MIN: CPT | Mod: S$GLB,,, | Performed by: INTERNAL MEDICINE

## 2018-04-25 PROCEDURE — 3074F SYST BP LT 130 MM HG: CPT | Mod: CPTII,S$GLB,, | Performed by: INTERNAL MEDICINE

## 2018-04-25 NOTE — PROGRESS NOTES
Subjective:       Patient ID: Danyel Rangel is a 83 y.o. male.    Chief Complaint: Dysuria    HPI  Checkup.  No CP, SOB, palpitations, AICD firing.  C/O weak urinary stream, nocturia x 1.  U/A shows 1+ blood, 1+ glucose.  No bleeding.  Myalgias, arthralgias at baseline.  Review of Systems   All other systems reviewed and are negative.      Objective:      Physical Exam   Constitutional: He appears well-developed. No distress.   HENT:   Head: Normocephalic.   Eyes: EOM are normal. No scleral icterus.   Neck: Normal range of motion. No tracheal deviation present.   Cardiovascular: Normal rate, regular rhythm, normal heart sounds and intact distal pulses.    Pulmonary/Chest: Effort normal and breath sounds normal. No respiratory distress.   Abdominal: Soft. Bowel sounds are normal. He exhibits no distension. There is no tenderness.   Musculoskeletal: Normal range of motion. He exhibits no edema.   Neurological: He is alert.   Skin: Skin is warm and dry. No rash noted. He is not diaphoretic. No erythema.   Purpura extremities   Psychiatric: He has a normal mood and affect. His behavior is normal.   Vitals reviewed.      Assessment:       1. Coronary artery disease involving native coronary artery of native heart without angina pectoris    2. Hypothyroidism, unspecified type    3. Chronic atrial fibrillation    4. HTN (hypertension), benign    5. Hyperlipidemia, unspecified hyperlipidemia type    6. AICD (automatic cardioverter/defibrillator) present    7. Benign prostatic hyperplasia with weak urinary stream    8. Senile purpura    9. Glucosuria    10. Abnormal finding of blood chemistry         Plan:       Danyel was seen today for dysuria.    Diagnoses and all orders for this visit:    Coronary artery disease involving native coronary artery of native heart without angina pectoris  -     CBC auto differential; Future    Hypothyroidism, unspecified type  -     TSH; Future    Chronic atrial fibrillation   Cont rx    HTN  (hypertension), benign    Hyperlipidemia, unspecified hyperlipidemia type  -     Comprehensive metabolic panel; Future  -     Lipid panel; Future    AICD (automatic cardioverter/defibrillator) present   Per Cardiology    Benign prostatic hyperplasia with weak urinary stream   Monitor    Senile purpura   Observe    Glucosuria  -     Hemoglobin A1c; Future    Abnormal finding of blood chemistry   -     Hemoglobin A1c; Future      Follow-up in about 6 months (around 10/25/2018).

## 2018-04-26 ENCOUNTER — LAB VISIT (OUTPATIENT)
Dept: LAB | Facility: HOSPITAL | Age: 83
End: 2018-04-26
Attending: INTERNAL MEDICINE
Payer: MEDICARE

## 2018-04-26 DIAGNOSIS — R79.9 ABNORMAL FINDING OF BLOOD CHEMISTRY: ICD-10-CM

## 2018-04-26 DIAGNOSIS — I25.10 CORONARY ARTERY DISEASE INVOLVING NATIVE CORONARY ARTERY OF NATIVE HEART WITHOUT ANGINA PECTORIS: ICD-10-CM

## 2018-04-26 DIAGNOSIS — R81 GLUCOSURIA: ICD-10-CM

## 2018-04-26 DIAGNOSIS — E78.5 HYPERLIPIDEMIA, UNSPECIFIED HYPERLIPIDEMIA TYPE: ICD-10-CM

## 2018-04-26 DIAGNOSIS — E03.9 HYPOTHYROIDISM, UNSPECIFIED TYPE: ICD-10-CM

## 2018-04-26 LAB
ALBUMIN SERPL BCP-MCNC: 3.8 G/DL
ALP SERPL-CCNC: 91 U/L
ALT SERPL W/O P-5'-P-CCNC: 24 U/L
ANION GAP SERPL CALC-SCNC: 10 MMOL/L
AST SERPL-CCNC: 32 U/L
BASOPHILS # BLD AUTO: 0.02 K/UL
BASOPHILS NFR BLD: 0.3 %
BILIRUB SERPL-MCNC: 1.8 MG/DL
BUN SERPL-MCNC: 27 MG/DL
CALCIUM SERPL-MCNC: 8.8 MG/DL
CHLORIDE SERPL-SCNC: 107 MMOL/L
CHOLEST SERPL-MCNC: 107 MG/DL
CHOLEST/HDLC SERPL: 2.4 {RATIO}
CO2 SERPL-SCNC: 28 MMOL/L
CREAT SERPL-MCNC: 1.38 MG/DL
DIFFERENTIAL METHOD: ABNORMAL
EOSINOPHIL # BLD AUTO: 0.3 K/UL
EOSINOPHIL NFR BLD: 4.7 %
ERYTHROCYTE [DISTWIDTH] IN BLOOD BY AUTOMATED COUNT: 14.3 %
EST. GFR  (AFRICAN AMERICAN): 54.3 ML/MIN/1.73 M^2
EST. GFR  (NON AFRICAN AMERICAN): 46.9 ML/MIN/1.73 M^2
ESTIMATED AVG GLUCOSE: 111 MG/DL
GLUCOSE SERPL-MCNC: 79 MG/DL
HBA1C MFR BLD HPLC: 5.5 %
HCT VFR BLD AUTO: 41.6 %
HDLC SERPL-MCNC: 45 MG/DL
HDLC SERPL: 42.1 %
HGB BLD-MCNC: 13.1 G/DL
LDLC SERPL CALC-MCNC: 50.8 MG/DL
LYMPHOCYTES # BLD AUTO: 1.9 K/UL
LYMPHOCYTES NFR BLD: 29 %
MCH RBC QN AUTO: 31.3 PG
MCHC RBC AUTO-ENTMCNC: 31.5 G/DL
MCV RBC AUTO: 99 FL
MONOCYTES # BLD AUTO: 0.5 K/UL
MONOCYTES NFR BLD: 8.2 %
NEUTROPHILS # BLD AUTO: 3.7 K/UL
NEUTROPHILS NFR BLD: 57.6 %
NONHDLC SERPL-MCNC: 62 MG/DL
PLATELET # BLD AUTO: 109 K/UL
PMV BLD AUTO: 11.9 FL
POTASSIUM SERPL-SCNC: 4 MMOL/L
PROT SERPL-MCNC: 6.8 G/DL
RBC # BLD AUTO: 4.19 M/UL
SODIUM SERPL-SCNC: 145 MMOL/L
TRIGL SERPL-MCNC: 56 MG/DL
TSH SERPL DL<=0.005 MIU/L-ACNC: 0.95 UIU/ML
WBC # BLD AUTO: 6.45 K/UL

## 2018-04-26 PROCEDURE — 36415 COLL VENOUS BLD VENIPUNCTURE: CPT | Mod: PO

## 2018-04-26 PROCEDURE — 80061 LIPID PANEL: CPT

## 2018-04-26 PROCEDURE — 85025 COMPLETE CBC W/AUTO DIFF WBC: CPT | Mod: PO

## 2018-04-26 PROCEDURE — 83036 HEMOGLOBIN GLYCOSYLATED A1C: CPT

## 2018-04-26 PROCEDURE — 80053 COMPREHEN METABOLIC PANEL: CPT | Mod: PO

## 2018-04-26 PROCEDURE — 84443 ASSAY THYROID STIM HORMONE: CPT | Mod: PO

## 2018-05-07 ENCOUNTER — OFFICE VISIT (OUTPATIENT)
Dept: CARDIOLOGY | Facility: CLINIC | Age: 83
End: 2018-05-07
Payer: MEDICARE

## 2018-05-07 VITALS
BODY MASS INDEX: 24.5 KG/M2 | HEIGHT: 68 IN | HEART RATE: 70 BPM | DIASTOLIC BLOOD PRESSURE: 69 MMHG | SYSTOLIC BLOOD PRESSURE: 105 MMHG | OXYGEN SATURATION: 99 % | WEIGHT: 161.69 LBS

## 2018-05-07 DIAGNOSIS — E78.2 MIXED HYPERLIPIDEMIA: ICD-10-CM

## 2018-05-07 DIAGNOSIS — I10 HTN (HYPERTENSION), BENIGN: ICD-10-CM

## 2018-05-07 DIAGNOSIS — Z95.1 S/P CABG (CORONARY ARTERY BYPASS GRAFT): ICD-10-CM

## 2018-05-07 DIAGNOSIS — I25.10 CORONARY ARTERY DISEASE INVOLVING NATIVE CORONARY ARTERY OF NATIVE HEART WITHOUT ANGINA PECTORIS: ICD-10-CM

## 2018-05-07 DIAGNOSIS — I48.20 CHRONIC ATRIAL FIBRILLATION: Primary | ICD-10-CM

## 2018-05-07 DIAGNOSIS — Z95.810 AICD (AUTOMATIC CARDIOVERTER/DEFIBRILLATOR) PRESENT: ICD-10-CM

## 2018-05-07 PROCEDURE — 99999 PR PBB SHADOW E&M-EST. PATIENT-LVL III: CPT | Mod: PBBFAC,,, | Performed by: INTERNAL MEDICINE

## 2018-05-07 PROCEDURE — 3074F SYST BP LT 130 MM HG: CPT | Mod: CPTII,S$GLB,, | Performed by: INTERNAL MEDICINE

## 2018-05-07 PROCEDURE — 3078F DIAST BP <80 MM HG: CPT | Mod: CPTII,S$GLB,, | Performed by: INTERNAL MEDICINE

## 2018-05-07 PROCEDURE — 99214 OFFICE O/P EST MOD 30 MIN: CPT | Mod: S$GLB,,, | Performed by: INTERNAL MEDICINE

## 2018-05-07 NOTE — PROGRESS NOTES
Subjective:   Patient ID:  Danyel Rangel is a 83 y.o. male who presents for follow-up of Follow-up      Problem List Items Addressed This Visit        Cardiac/Vascular    Chronic atrial fibrillation - Primary    HTN (hypertension), benign    HLD (hyperlipidemia)    AICD (automatic cardioverter/defibrillator) present    Coronary artery disease involving native coronary artery of native heart without angina pectoris    S/P CABG (coronary artery bypass graft)          HPI: Patient with PMH of CAD and CABG, AICD, HTN and HLD present for f/u. He is doing well with no chest pain or SOB. No orthopnea or PND. No dizziness or syncope. BP is controlled. HR is controlled with no palpitations.    No bleeding on eliquis. No shocks by AICD. He is compliant with medications and low salt diet. Weight is stable.     Review of Systems   Constitution: Negative.   HENT: Negative.    Eyes: Negative.    Cardiovascular: Negative.    Respiratory: Negative.    Endocrine: Negative.    Hematologic/Lymphatic: Negative.    Skin: Negative.    Musculoskeletal: Negative.    Gastrointestinal: Negative.    Neurological: Negative.        Patient's Medications   New Prescriptions    No medications on file   Previous Medications    ALLOPURINOL (ZYLOPRIM) 300 MG TABLET    Take 1 tablet (300 mg total) by mouth once daily.    APIXABAN (ELIQUIS) 5 MG TAB    Take 1 tablet (5 mg total) by mouth 2 (two) times daily.    ARM BRACE MISC    1 Device by Misc.(Non-Drug; Combo Route) route continuous.    FUROSEMIDE (LASIX) 20 MG TABLET    Take 1 tablet (20 mg total) by mouth 2 (two) times daily.    LEVOTHYROXINE (SYNTHROID) 50 MCG TABLET    Take 1 tablet (50 mcg total) by mouth once daily.    LISINOPRIL (PRINIVIL,ZESTRIL) 20 MG TABLET    Take 1 tablet (20 mg total) by mouth once daily.    METOPROLOL SUCCINATE (TOPROL-XL) 25 MG 24 HR TABLET    Take 1 tablet (25 mg total) by mouth once daily.    POTASSIUM CHLORIDE SA (K-DUR,KLOR-CON) 10 MEQ TABLET    Take 1 tablet  (10 mEq total) by mouth once daily.    SIMVASTATIN (ZOCOR) 20 MG TABLET    Take 1 tablet (20 mg total) by mouth every evening.   Modified Medications    No medications on file   Discontinued Medications    No medications on file       Objective:   Physical Exam   Constitutional: He is oriented to person, place, and time. He appears well-developed and well-nourished. No distress.   Examination of the digits showed no clubbing or cyanosis   HENT:   Head: Normocephalic and atraumatic.   Eyes: Conjunctivae are normal. Pupils are equal, round, and reactive to light. Right eye exhibits no discharge.   Neck: Normal range of motion. Neck supple. No JVD present. No thyromegaly present.   No carotid bruits   Cardiovascular: Normal rate, regular rhythm, S1 normal, S2 normal, normal heart sounds, intact distal pulses and normal pulses.  PMI is not displaced.  Exam reveals no gallop, no friction rub and no opening snap.    No murmur heard.  Pulmonary/Chest: Effort normal and breath sounds normal. No respiratory distress. He has no wheezes. He has no rales. He exhibits no tenderness.   Abdominal: Soft. Bowel sounds are normal. He exhibits no distension and no mass. There is no tenderness. There is no guarding.   No hepatosplenomegaly   Musculoskeletal: Normal range of motion. He exhibits no edema or tenderness.   Lymphadenopathy:     He has no cervical adenopathy.   Neurological: He is alert and oriented to person, place, and time.   Skin: Skin is warm. No rash noted. He is not diaphoretic. No erythema.   Psychiatric: He has a normal mood and affect.   Nursing note and vitals reviewed.      ECGs reviewed-ventricular paced rhythm  LABS reviewed  Imaging including Echoes reviewed    Assessment:     1. Chronic atrial fibrillation    2. HTN (hypertension), benign    3. Mixed hyperlipidemia    4. AICD (automatic cardioverter/defibrillator) present    5. Coronary artery disease involving native coronary artery of native heart without  angina pectoris    6. S/P CABG (coronary artery bypass graft)        Plan:   AICD interrogation  Continue current medications  Low salt diet  Activity as tolerated  F/u in 6 months

## 2018-05-21 ENCOUNTER — TELEPHONE (OUTPATIENT)
Dept: CARDIOLOGY | Facility: CLINIC | Age: 83
End: 2018-05-21

## 2018-05-21 NOTE — TELEPHONE ENCOUNTER
Patient came in to the office to see if I could call [facility] for him to check the status of his Eliquis.

## 2018-08-27 RX ORDER — METOPROLOL SUCCINATE 25 MG/1
TABLET, EXTENDED RELEASE ORAL
Qty: 90 TABLET | Refills: 2 | Status: SHIPPED | OUTPATIENT
Start: 2018-08-27 | End: 2018-10-25

## 2018-10-25 ENCOUNTER — OFFICE VISIT (OUTPATIENT)
Dept: INTERNAL MEDICINE | Facility: CLINIC | Age: 83
End: 2018-10-25
Payer: MEDICARE

## 2018-10-25 VITALS
WEIGHT: 156.5 LBS | HEIGHT: 68 IN | HEART RATE: 82 BPM | SYSTOLIC BLOOD PRESSURE: 140 MMHG | TEMPERATURE: 99 F | DIASTOLIC BLOOD PRESSURE: 76 MMHG | OXYGEN SATURATION: 97 % | BODY MASS INDEX: 23.72 KG/M2

## 2018-10-25 DIAGNOSIS — I25.10 CORONARY ARTERY DISEASE INVOLVING NATIVE CORONARY ARTERY OF NATIVE HEART WITHOUT ANGINA PECTORIS: ICD-10-CM

## 2018-10-25 DIAGNOSIS — I48.20 CHRONIC ATRIAL FIBRILLATION: ICD-10-CM

## 2018-10-25 DIAGNOSIS — I10 HTN (HYPERTENSION), BENIGN: ICD-10-CM

## 2018-10-25 DIAGNOSIS — Z00.00 ROUTINE GENERAL MEDICAL EXAMINATION AT A HEALTH CARE FACILITY: Primary | ICD-10-CM

## 2018-10-25 DIAGNOSIS — N40.1 BENIGN PROSTATIC HYPERPLASIA WITH WEAK URINARY STREAM: ICD-10-CM

## 2018-10-25 DIAGNOSIS — R39.12 BENIGN PROSTATIC HYPERPLASIA WITH WEAK URINARY STREAM: ICD-10-CM

## 2018-10-25 DIAGNOSIS — N18.30 CKD (CHRONIC KIDNEY DISEASE), STAGE III: ICD-10-CM

## 2018-10-25 DIAGNOSIS — E78.5 HYPERLIPIDEMIA, UNSPECIFIED HYPERLIPIDEMIA TYPE: ICD-10-CM

## 2018-10-25 PROCEDURE — 90662 IIV NO PRSV INCREASED AG IM: CPT | Mod: PBBFAC,PN

## 2018-10-25 PROCEDURE — 99397 PER PM REEVAL EST PAT 65+ YR: CPT | Mod: S$PBB,,, | Performed by: INTERNAL MEDICINE

## 2018-10-25 PROCEDURE — 99999 PR PBB SHADOW E&M-EST. PATIENT-LVL III: CPT | Mod: PBBFAC,,, | Performed by: INTERNAL MEDICINE

## 2018-10-25 PROCEDURE — 3078F DIAST BP <80 MM HG: CPT | Mod: CPTII,,, | Performed by: INTERNAL MEDICINE

## 2018-10-25 PROCEDURE — 99213 OFFICE O/P EST LOW 20 MIN: CPT | Mod: PBBFAC,PN,25 | Performed by: INTERNAL MEDICINE

## 2018-10-25 PROCEDURE — 3077F SYST BP >= 140 MM HG: CPT | Mod: CPTII,,, | Performed by: INTERNAL MEDICINE

## 2018-10-25 RX ORDER — FINASTERIDE 5 MG/1
5 TABLET, FILM COATED ORAL DAILY
Qty: 90 TABLET | Refills: 3 | Status: SHIPPED | OUTPATIENT
Start: 2018-10-25 | End: 2019-01-01 | Stop reason: SDUPTHER

## 2018-10-25 RX ORDER — METOPROLOL SUCCINATE 100 MG/1
100 TABLET, EXTENDED RELEASE ORAL DAILY
Qty: 90 TABLET | Refills: 3 | Status: SHIPPED | OUTPATIENT
Start: 2018-10-25 | End: 2019-01-01 | Stop reason: SDUPTHER

## 2018-10-25 NOTE — PROGRESS NOTES
Subjective:       Patient ID: Danyel Rangel is a 83 y.o. male.    Chief Complaint: Follow-up (6 month f/u, flu shot)    HPI  Wellness check.  Old labs reviewed.  No CP, SOB, palpitations.  No bleeding.  C/O weak urinary stream.  C/O lassitude but keeping active.  Review of Systems   All other systems reviewed and are negative.      Objective:      Physical Exam   Constitutional: He appears well-developed. No distress.   HENT:   Head: Normocephalic.   Eyes: EOM are normal. No scleral icterus.   Neck: Normal range of motion. No tracheal deviation present.   Cardiovascular: Normal rate, regular rhythm, normal heart sounds and intact distal pulses.   Pulmonary/Chest: Effort normal and breath sounds normal. No respiratory distress.   Abdominal: Soft. Bowel sounds are normal. He exhibits no distension. There is no tenderness.   Musculoskeletal: Normal range of motion. He exhibits no edema.   Neurological: He is alert.   Skin: Skin is warm and dry. No rash noted. He is not diaphoretic. No erythema.   Purpura extremities   Psychiatric: He has a normal mood and affect. His behavior is normal.   Vitals reviewed.      Assessment:       1. Routine general medical examination at a health care facility    2. Chronic atrial fibrillation    3. HTN (hypertension), benign    4. Hyperlipidemia, unspecified hyperlipidemia type    5. Benign prostatic hyperplasia with weak urinary stream    6. Coronary artery disease involving native coronary artery of native heart without angina pectoris    7. CKD (chronic kidney disease), stage III        Plan:       Danyel was seen today for follow-up.    Diagnoses and all orders for this visit:    Routine general medical examination at a health care facility  -     Influenza - High Dose (65+) (PF) (IM)    Chronic atrial fibrillation   Cont rx    HTN (hypertension), benign  -     metoprolol succinate (TOPROL-XL) 100 MG 24 hr tablet; Take 1 tablet (100 mg total) by mouth once daily.    Hyperlipidemia,  unspecified hyperlipidemia type   Well-cont    Benign prostatic hyperplasia with weak urinary stream  -     finasteride (PROSCAR) 5 mg tablet; Take 1 tablet (5 mg total) by mouth once daily.    Coronary artery disease involving native coronary artery of native heart without angina pectoris   Quiescent    CKD (chronic kidney disease), stage III  -     Renal function panel; Future   D/C ACEI    Follow-up in about 1 month (around 11/25/2018).

## 2018-11-09 NOTE — TELEPHONE ENCOUNTER
----- Message from Claribel Bonilla sent at 11/8/2018  3:28 PM CST -----  Contact: self / [phone]  Patient is requesting a call back. Please advise

## 2018-11-23 ENCOUNTER — LAB VISIT (OUTPATIENT)
Dept: LAB | Facility: HOSPITAL | Age: 83
End: 2018-11-23
Attending: INTERNAL MEDICINE
Payer: MEDICARE

## 2018-11-23 DIAGNOSIS — N18.30 CKD (CHRONIC KIDNEY DISEASE), STAGE III: ICD-10-CM

## 2018-11-23 LAB
ALBUMIN SERPL BCP-MCNC: 4.2 G/DL
ANION GAP SERPL CALC-SCNC: 8 MMOL/L
BUN SERPL-MCNC: 31 MG/DL
CALCIUM SERPL-MCNC: 9.1 MG/DL
CHLORIDE SERPL-SCNC: 102 MMOL/L
CO2 SERPL-SCNC: 31 MMOL/L
CREAT SERPL-MCNC: 1.49 MG/DL
EST. GFR  (AFRICAN AMERICAN): 49.5 ML/MIN/1.73 M^2
EST. GFR  (NON AFRICAN AMERICAN): 42.8 ML/MIN/1.73 M^2
GLUCOSE SERPL-MCNC: 93 MG/DL
PHOSPHATE SERPL-MCNC: 4.4 MG/DL
POTASSIUM SERPL-SCNC: 3.6 MMOL/L
SODIUM SERPL-SCNC: 141 MMOL/L

## 2018-11-23 PROCEDURE — 80069 RENAL FUNCTION PANEL: CPT | Mod: PO

## 2018-11-23 PROCEDURE — 36415 COLL VENOUS BLD VENIPUNCTURE: CPT | Mod: PO

## 2018-11-26 ENCOUNTER — OFFICE VISIT (OUTPATIENT)
Dept: INTERNAL MEDICINE | Facility: CLINIC | Age: 83
End: 2018-11-26
Payer: MEDICARE

## 2018-11-26 VITALS
HEIGHT: 68 IN | DIASTOLIC BLOOD PRESSURE: 72 MMHG | WEIGHT: 159.63 LBS | BODY MASS INDEX: 24.19 KG/M2 | SYSTOLIC BLOOD PRESSURE: 134 MMHG | HEART RATE: 80 BPM

## 2018-11-26 DIAGNOSIS — E78.5 HYPERLIPIDEMIA, UNSPECIFIED HYPERLIPIDEMIA TYPE: ICD-10-CM

## 2018-11-26 DIAGNOSIS — I25.10 CORONARY ARTERY DISEASE INVOLVING NATIVE CORONARY ARTERY OF NATIVE HEART WITHOUT ANGINA PECTORIS: ICD-10-CM

## 2018-11-26 DIAGNOSIS — R39.12 BENIGN PROSTATIC HYPERPLASIA WITH WEAK URINARY STREAM: ICD-10-CM

## 2018-11-26 DIAGNOSIS — I10 HTN (HYPERTENSION), BENIGN: ICD-10-CM

## 2018-11-26 DIAGNOSIS — N40.1 BENIGN PROSTATIC HYPERPLASIA WITH WEAK URINARY STREAM: ICD-10-CM

## 2018-11-26 DIAGNOSIS — N18.30 CKD (CHRONIC KIDNEY DISEASE), STAGE III: ICD-10-CM

## 2018-11-26 DIAGNOSIS — I48.20 CHRONIC ATRIAL FIBRILLATION: Primary | ICD-10-CM

## 2018-11-26 DIAGNOSIS — E03.9 HYPOTHYROIDISM, UNSPECIFIED TYPE: ICD-10-CM

## 2018-11-26 DIAGNOSIS — M1A.9XX0 CHRONIC GOUT WITHOUT TOPHUS, UNSPECIFIED CAUSE, UNSPECIFIED SITE: ICD-10-CM

## 2018-11-26 PROCEDURE — 3075F SYST BP GE 130 - 139MM HG: CPT | Mod: CPTII,S$GLB,, | Performed by: INTERNAL MEDICINE

## 2018-11-26 PROCEDURE — 99213 OFFICE O/P EST LOW 20 MIN: CPT | Mod: S$GLB,,, | Performed by: INTERNAL MEDICINE

## 2018-11-26 PROCEDURE — 1101F PT FALLS ASSESS-DOCD LE1/YR: CPT | Mod: CPTII,S$GLB,, | Performed by: INTERNAL MEDICINE

## 2018-11-26 PROCEDURE — 3078F DIAST BP <80 MM HG: CPT | Mod: CPTII,S$GLB,, | Performed by: INTERNAL MEDICINE

## 2018-11-26 PROCEDURE — 99999 PR PBB SHADOW E&M-EST. PATIENT-LVL III: CPT | Mod: PBBFAC,,, | Performed by: INTERNAL MEDICINE

## 2018-11-26 RX ORDER — POTASSIUM CHLORIDE 750 MG/1
TABLET, EXTENDED RELEASE ORAL
Qty: 90 TABLET | Refills: 3 | Status: SHIPPED | OUTPATIENT
Start: 2018-11-26 | End: 2019-01-01 | Stop reason: SDUPTHER

## 2018-11-26 RX ORDER — LISINOPRIL 20 MG/1
TABLET ORAL
Qty: 90 TABLET | Refills: 3 | Status: SHIPPED | OUTPATIENT
Start: 2018-11-26 | End: 2019-01-01 | Stop reason: SDUPTHER

## 2018-11-26 RX ORDER — FUROSEMIDE 20 MG/1
TABLET ORAL
Qty: 180 TABLET | Refills: 3 | Status: SHIPPED | OUTPATIENT
Start: 2018-11-26 | End: 2019-01-01 | Stop reason: SDUPTHER

## 2018-11-26 RX ORDER — SIMVASTATIN 20 MG/1
TABLET, FILM COATED ORAL
Qty: 90 TABLET | Refills: 3 | Status: SHIPPED | OUTPATIENT
Start: 2018-11-26 | End: 2019-01-01 | Stop reason: SDUPTHER

## 2018-11-26 RX ORDER — LEVOTHYROXINE SODIUM 50 UG/1
TABLET ORAL
Qty: 90 TABLET | Refills: 3 | Status: SHIPPED | OUTPATIENT
Start: 2018-11-26 | End: 2019-01-01 | Stop reason: SDUPTHER

## 2018-11-26 RX ORDER — ALLOPURINOL 300 MG/1
TABLET ORAL
Qty: 90 TABLET | Refills: 3 | Status: SHIPPED | OUTPATIENT
Start: 2018-11-26 | End: 2019-01-01 | Stop reason: SDUPTHER

## 2018-11-26 NOTE — PROGRESS NOTES
Subjective:       Patient ID: Danyel Rangel is a 83 y.o. male.    Chief Complaint: Follow-up (one month)    HPI  Recheck.  Labs reviewed.  No CP, SOB, palpitations.  Urine stream sl stronger.  No gout flares.  No bleeding.  Review of Systems    Objective:      Physical Exam   Constitutional: He appears well-developed. No distress.   HENT:   Head: Normocephalic.   Eyes: EOM are normal. No scleral icterus.   Neck: Normal range of motion. No tracheal deviation present.   Cardiovascular: Normal rate, regular rhythm and intact distal pulses.   Pulmonary/Chest: Effort normal. No respiratory distress.   Abdominal: Soft. Bowel sounds are normal. He exhibits no distension. There is no tenderness.   Musculoskeletal: Normal range of motion. He exhibits no edema.   Neurological: He is alert.   Skin: Skin is warm and dry. No rash noted. He is not diaphoretic. No erythema.   Purpura extremities   Psychiatric: He has a normal mood and affect. His behavior is normal.   Vitals reviewed.      Assessment:       1. Chronic atrial fibrillation    2. HTN (hypertension), benign    3. Hyperlipidemia, unspecified hyperlipidemia type    4. Benign prostatic hyperplasia with weak urinary stream    5. Coronary artery disease involving native coronary artery of native heart without angina pectoris    6. CKD (chronic kidney disease), stage III    7. Hypothyroidism, unspecified type    8. Chronic gout without tophus, unspecified cause, unspecified site        Plan:       Danyel was seen today for follow-up.    Diagnoses and all orders for this visit:    Chronic atrial fibrillation   Cont rx    HTN (hypertension), benign    Well-cont    Hyperlipidemia, unspecified hyperlipidemia type  -     Lipid panel; Future    Benign prostatic hyperplasia with weak urinary stream   Cont rx    Coronary artery disease involving native coronary artery of native heart without angina pectoris   Quiescent    CKD (chronic kidney disease), stage III  -     CBC auto  differential; Future  -     Comprehensive metabolic panel; Future    Hypothyroidism, unspecified type  -     TSH; Future    Chronic gout without tophus, unspecified cause, unspecified site  -     Uric acid; Future      Follow-up in about 6 months (around 5/26/2019).

## 2019-01-01 ENCOUNTER — OFFICE VISIT (OUTPATIENT)
Dept: CARDIOLOGY | Facility: CLINIC | Age: 84
End: 2019-01-01
Payer: MEDICARE

## 2019-01-01 ENCOUNTER — PATIENT OUTREACH (OUTPATIENT)
Dept: ADMINISTRATIVE | Facility: OTHER | Age: 84
End: 2019-01-01

## 2019-01-01 ENCOUNTER — PATIENT OUTREACH (OUTPATIENT)
Dept: ADMINISTRATIVE | Facility: HOSPITAL | Age: 84
End: 2019-01-01

## 2019-01-01 ENCOUNTER — CLINICAL SUPPORT (OUTPATIENT)
Dept: CARDIOLOGY | Facility: CLINIC | Age: 84
End: 2019-01-01
Payer: MEDICARE

## 2019-01-01 ENCOUNTER — TELEPHONE (OUTPATIENT)
Dept: VASCULAR SURGERY | Facility: CLINIC | Age: 84
End: 2019-01-01

## 2019-01-01 ENCOUNTER — TELEPHONE (OUTPATIENT)
Dept: CARDIOLOGY | Facility: CLINIC | Age: 84
End: 2019-01-01

## 2019-01-01 ENCOUNTER — HOSPITAL ENCOUNTER (OUTPATIENT)
Dept: CARDIOLOGY | Facility: HOSPITAL | Age: 84
Discharge: HOME | End: 2019-09-03
Attending: INTERNAL MEDICINE
Payer: MEDICARE

## 2019-01-01 VITALS
HEART RATE: 70 BPM | DIASTOLIC BLOOD PRESSURE: 58 MMHG | WEIGHT: 160 LBS | HEIGHT: 68 IN | SYSTOLIC BLOOD PRESSURE: 97 MMHG | BODY MASS INDEX: 24.25 KG/M2

## 2019-01-01 DIAGNOSIS — E78.2 MIXED HYPERLIPIDEMIA: ICD-10-CM

## 2019-01-01 DIAGNOSIS — I50.9 CONGESTIVE HEART FAILURE, NYHA CLASS 1, UNSPECIFIED CONGESTIVE HEART FAILURE TYPE: ICD-10-CM

## 2019-01-01 DIAGNOSIS — I10 HTN (HYPERTENSION), BENIGN: ICD-10-CM

## 2019-01-01 DIAGNOSIS — R39.12 BENIGN PROSTATIC HYPERPLASIA WITH WEAK URINARY STREAM: ICD-10-CM

## 2019-01-01 DIAGNOSIS — Z95.810 AICD (AUTOMATIC CARDIOVERTER/DEFIBRILLATOR) PRESENT: Primary | ICD-10-CM

## 2019-01-01 DIAGNOSIS — N18.30 CKD (CHRONIC KIDNEY DISEASE), STAGE III: ICD-10-CM

## 2019-01-01 DIAGNOSIS — I25.9 CHRONIC ISCHEMIC HEART DISEASE: ICD-10-CM

## 2019-01-01 DIAGNOSIS — N40.1 BENIGN PROSTATIC HYPERPLASIA WITH WEAK URINARY STREAM: ICD-10-CM

## 2019-01-01 DIAGNOSIS — I48.20 CHRONIC ATRIAL FIBRILLATION: ICD-10-CM

## 2019-01-01 DIAGNOSIS — Z95.810 AICD (AUTOMATIC CARDIOVERTER/DEFIBRILLATOR) PRESENT: ICD-10-CM

## 2019-01-01 DIAGNOSIS — I25.10 CORONARY ARTERY DISEASE INVOLVING NATIVE CORONARY ARTERY OF NATIVE HEART WITHOUT ANGINA PECTORIS: ICD-10-CM

## 2019-01-01 DIAGNOSIS — Z95.1 S/P CABG (CORONARY ARTERY BYPASS GRAFT): ICD-10-CM

## 2019-01-01 LAB
AORTIC ROOT ANNULUS: 2.73 CM
AORTIC VALVE CUSP SEPERATION: 1.51 CM
AV INDEX (PROSTH): 0.45
AV MEAN GRADIENT: 5 MMHG
AV PEAK GRADIENT: 8 MMHG
AV REGURGITATION PRESSURE HALF TIME: 480 MS
AV VALVE AREA: 1.51 CM2
AV VELOCITY RATIO: 0.44
CV ECHO LV RWT: 0.48 CM
DOP CALC AO PEAK VEL: 1.44 M/S
DOP CALC AO VTI: 27.32 CM
DOP CALC LVOT AREA: 3.4 CM2
DOP CALC LVOT DIAMETER: 2.08 CM
DOP CALC LVOT PEAK VEL: 0.63 M/S
DOP CALC LVOT STROKE VOLUME: 41.37 CM3
DOP CALCLVOT PEAK VEL VTI: 12.18 CM
E/E' RATIO: 11.33 M/S
ECHO LV POSTERIOR WALL: 1.1 CM (ref 0.6–1.1)
FRACTIONAL SHORTENING: 30 % (ref 28–44)
INTERVENTRICULAR SEPTUM: 1.1 CM (ref 0.6–1.1)
IVC PROX: 0.8 CM
LA MAJOR: 6.86 CM
LA MINOR: 6.67 CM
LA WIDTH: 4.5 CM
LEFT ATRIUM SIZE: 4.76 CM
LEFT ATRIUM VOLUME: 123.15 CM3
LEFT INTERNAL DIMENSION IN SYSTOLE: 3.23 CM (ref 2.1–4)
LEFT VENTRICLE DIASTOLIC VOLUME: 91.71 ML
LEFT VENTRICLE SYSTOLIC VOLUME: 41.99 ML
LEFT VENTRICULAR INTERNAL DIMENSION IN DIASTOLE: 4.6 CM (ref 3.5–6)
LEFT VENTRICULAR MASS: 181.22 G
LV LATERAL E/E' RATIO: 9.27 M/S
LV SEPTAL E/E' RATIO: 14.57 M/S
MV PEAK E VEL: 1.02 M/S
PISA TR MAX VEL: 2.78 M/S
PULM VEIN S/D RATIO: 0.41
PV PEAK D VEL: 0.58 M/S
PV PEAK S VEL: 0.24 M/S
PV PEAK VELOCITY: 0.79 CM/S
RA MAJOR: 6.6 CM
RA WIDTH: 4.37 CM
RIGHT VENTRICULAR END-DIASTOLIC DIMENSION: 3.6 CM
SINUS: 3.17 CM
TDI LATERAL: 0.11 M/S
TDI SEPTAL: 0.07 M/S
TDI: 0.09 M/S
TR MAX PG: 31 MMHG
TRICUSPID ANNULAR PLANE SYSTOLIC EXCURSION: 1.11 CM

## 2019-01-01 PROCEDURE — 99999 PR PBB SHADOW E&M-EST. PATIENT-LVL III: CPT | Mod: PBBFAC,,, | Performed by: INTERNAL MEDICINE

## 2019-01-01 PROCEDURE — 3074F SYST BP LT 130 MM HG: CPT | Mod: CPTII,S$GLB,, | Performed by: INTERNAL MEDICINE

## 2019-01-01 PROCEDURE — 1101F PR PT FALLS ASSESS DOC 0-1 FALLS W/OUT INJ PAST YR: ICD-10-PCS | Mod: CPTII,S$GLB,, | Performed by: INTERNAL MEDICINE

## 2019-01-01 PROCEDURE — 93306 TRANSTHORACIC ECHO (TTE) COMPLETE (CUPID ONLY): ICD-10-PCS | Mod: 26,,, | Performed by: INTERNAL MEDICINE

## 2019-01-01 PROCEDURE — 99499 NO LOS: ICD-10-PCS | Mod: S$GLB,,, | Performed by: INTERNAL MEDICINE

## 2019-01-01 PROCEDURE — 93295 PR INTERROG EVAL, REMOTE, UP TO 90 DAYS,CARDVERT/DEFIB: ICD-10-PCS | Mod: S$GLB,,, | Performed by: INTERNAL MEDICINE

## 2019-01-01 PROCEDURE — 93289 INTERROG DEVICE EVAL HEART: CPT | Mod: 26,,, | Performed by: INTERNAL MEDICINE

## 2019-01-01 PROCEDURE — 93306 TTE W/DOPPLER COMPLETE: CPT | Mod: PO

## 2019-01-01 PROCEDURE — 99499 UNLISTED E&M SERVICE: CPT | Mod: S$GLB,,, | Performed by: INTERNAL MEDICINE

## 2019-01-01 PROCEDURE — 3078F DIAST BP <80 MM HG: CPT | Mod: CPTII,S$GLB,, | Performed by: INTERNAL MEDICINE

## 2019-01-01 PROCEDURE — 3074F PR MOST RECENT SYSTOLIC BLOOD PRESSURE < 130 MM HG: ICD-10-PCS | Mod: CPTII,S$GLB,, | Performed by: INTERNAL MEDICINE

## 2019-01-01 PROCEDURE — 3078F PR MOST RECENT DIASTOLIC BLOOD PRESSURE < 80 MM HG: ICD-10-PCS | Mod: CPTII,S$GLB,, | Performed by: INTERNAL MEDICINE

## 2019-01-01 PROCEDURE — 99999 PR PBB SHADOW E&M-EST. PATIENT-LVL III: ICD-10-PCS | Mod: PBBFAC,,, | Performed by: INTERNAL MEDICINE

## 2019-01-01 PROCEDURE — 93289 PR INTERROG EVAL, IN PERSON,CARDVERT/DEFIB: ICD-10-PCS | Mod: 26,,, | Performed by: INTERNAL MEDICINE

## 2019-01-01 PROCEDURE — 93306 TTE W/DOPPLER COMPLETE: CPT | Mod: 26,,, | Performed by: INTERNAL MEDICINE

## 2019-01-01 PROCEDURE — 93296 REM INTERROG EVL PM/IDS: CPT | Mod: S$GLB,,, | Performed by: INTERNAL MEDICINE

## 2019-01-01 PROCEDURE — 93295 DEV INTERROG REMOTE 1/2/MLT: CPT | Mod: S$GLB,,, | Performed by: INTERNAL MEDICINE

## 2019-01-01 PROCEDURE — 1101F PT FALLS ASSESS-DOCD LE1/YR: CPT | Mod: CPTII,S$GLB,, | Performed by: INTERNAL MEDICINE

## 2019-01-01 PROCEDURE — 99215 OFFICE O/P EST HI 40 MIN: CPT | Mod: S$GLB,,, | Performed by: INTERNAL MEDICINE

## 2019-01-01 PROCEDURE — 93296 PR INTERROG EVAL, REMOTE, UP TO 90 DAYS, PACER/DEFIB,TECH REVIEW: ICD-10-PCS | Mod: S$GLB,,, | Performed by: INTERNAL MEDICINE

## 2019-01-01 PROCEDURE — 99215 PR OFFICE/OUTPT VISIT, EST, LEVL V, 40-54 MIN: ICD-10-PCS | Mod: S$GLB,,, | Performed by: INTERNAL MEDICINE

## 2019-01-01 RX ORDER — LISINOPRIL 20 MG/1
TABLET ORAL
Qty: 90 TABLET | Refills: 3 | Status: SHIPPED | OUTPATIENT
Start: 2019-01-01 | End: 2020-01-01 | Stop reason: SDUPTHER

## 2019-01-01 RX ORDER — METOPROLOL SUCCINATE 100 MG/1
100 TABLET, EXTENDED RELEASE ORAL DAILY
Qty: 90 TABLET | Refills: 4 | Status: SHIPPED | OUTPATIENT
Start: 2019-01-01 | End: 2020-01-01

## 2019-01-01 RX ORDER — LEVOTHYROXINE SODIUM 50 UG/1
TABLET ORAL
Qty: 90 TABLET | Refills: 4 | Status: ON HOLD | OUTPATIENT
Start: 2019-01-01 | End: 2020-01-01 | Stop reason: HOSPADM

## 2019-01-01 RX ORDER — APIXABAN 5 MG/1
TABLET, FILM COATED ORAL
Qty: 180 TABLET | Refills: 0 | Status: SHIPPED | OUTPATIENT
Start: 2019-01-01 | End: 2020-01-01 | Stop reason: SDUPTHER

## 2019-01-01 RX ORDER — FINASTERIDE 5 MG/1
5 TABLET, FILM COATED ORAL DAILY
Qty: 90 TABLET | Refills: 3 | Status: CANCELLED | OUTPATIENT
Start: 2019-01-01 | End: 2020-06-03

## 2019-01-01 RX ORDER — FINASTERIDE 5 MG/1
5 TABLET, FILM COATED ORAL DAILY
Qty: 90 TABLET | Refills: 3 | Status: ON HOLD | OUTPATIENT
Start: 2019-01-01 | End: 2020-01-01 | Stop reason: HOSPADM

## 2019-01-01 RX ORDER — SIMVASTATIN 20 MG/1
TABLET, FILM COATED ORAL
Qty: 90 TABLET | Refills: 3 | Status: SHIPPED | OUTPATIENT
Start: 2019-01-01 | End: 2020-01-01 | Stop reason: SDUPTHER

## 2019-01-01 RX ORDER — FUROSEMIDE 20 MG/1
TABLET ORAL
Qty: 180 TABLET | Refills: 3 | Status: SHIPPED | OUTPATIENT
Start: 2019-01-01 | End: 2020-01-01 | Stop reason: SDUPTHER

## 2019-01-01 RX ORDER — FINASTERIDE 5 MG/1
5 TABLET, FILM COATED ORAL DAILY
Qty: 90 TABLET | Refills: 3 | Status: SHIPPED | OUTPATIENT
Start: 2019-01-01 | End: 2019-01-01 | Stop reason: SDUPTHER

## 2019-01-01 RX ORDER — ALLOPURINOL 300 MG/1
TABLET ORAL
Qty: 90 TABLET | Refills: 4 | Status: ON HOLD | OUTPATIENT
Start: 2019-01-01 | End: 2020-01-01 | Stop reason: HOSPADM

## 2019-01-01 RX ORDER — POTASSIUM CHLORIDE 750 MG/1
TABLET, EXTENDED RELEASE ORAL
Qty: 90 TABLET | Refills: 3 | Status: ON HOLD | OUTPATIENT
Start: 2019-01-01 | End: 2020-01-01 | Stop reason: HOSPADM

## 2019-01-09 RX ORDER — APIXABAN 5 MG/1
TABLET, FILM COATED ORAL
Qty: 180 TABLET | Refills: 2 | Status: SHIPPED | OUTPATIENT
Start: 2019-01-09 | End: 2019-01-01 | Stop reason: SDUPTHER

## 2019-01-14 ENCOUNTER — OFFICE VISIT (OUTPATIENT)
Dept: CARDIOLOGY | Facility: CLINIC | Age: 84
End: 2019-01-14
Payer: MEDICARE

## 2019-01-14 VITALS
BODY MASS INDEX: 25.08 KG/M2 | HEART RATE: 70 BPM | OXYGEN SATURATION: 97 % | WEIGHT: 165.5 LBS | HEIGHT: 68 IN | DIASTOLIC BLOOD PRESSURE: 52 MMHG | SYSTOLIC BLOOD PRESSURE: 98 MMHG

## 2019-01-14 DIAGNOSIS — I25.10 CORONARY ARTERY DISEASE INVOLVING NATIVE CORONARY ARTERY OF NATIVE HEART WITHOUT ANGINA PECTORIS: ICD-10-CM

## 2019-01-14 DIAGNOSIS — Z95.810 AICD (AUTOMATIC CARDIOVERTER/DEFIBRILLATOR) PRESENT: ICD-10-CM

## 2019-01-14 DIAGNOSIS — I10 HTN (HYPERTENSION), BENIGN: ICD-10-CM

## 2019-01-14 DIAGNOSIS — I48.20 CHRONIC ATRIAL FIBRILLATION: Primary | ICD-10-CM

## 2019-01-14 DIAGNOSIS — E78.2 MIXED HYPERLIPIDEMIA: ICD-10-CM

## 2019-01-14 DIAGNOSIS — Z95.1 S/P CABG (CORONARY ARTERY BYPASS GRAFT): ICD-10-CM

## 2019-01-14 PROCEDURE — 3078F DIAST BP <80 MM HG: CPT | Mod: CPTII,S$GLB,, | Performed by: INTERNAL MEDICINE

## 2019-01-14 PROCEDURE — 1101F PT FALLS ASSESS-DOCD LE1/YR: CPT | Mod: CPTII,S$GLB,, | Performed by: INTERNAL MEDICINE

## 2019-01-14 PROCEDURE — 99999 PR PBB SHADOW E&M-EST. PATIENT-LVL III: CPT | Mod: PBBFAC,,, | Performed by: INTERNAL MEDICINE

## 2019-01-14 PROCEDURE — 3074F PR MOST RECENT SYSTOLIC BLOOD PRESSURE < 130 MM HG: ICD-10-PCS | Mod: CPTII,S$GLB,, | Performed by: INTERNAL MEDICINE

## 2019-01-14 PROCEDURE — 3074F SYST BP LT 130 MM HG: CPT | Mod: CPTII,S$GLB,, | Performed by: INTERNAL MEDICINE

## 2019-01-14 PROCEDURE — 99214 OFFICE O/P EST MOD 30 MIN: CPT | Mod: S$GLB,,, | Performed by: INTERNAL MEDICINE

## 2019-01-14 PROCEDURE — 99214 PR OFFICE/OUTPT VISIT, EST, LEVL IV, 30-39 MIN: ICD-10-PCS | Mod: S$GLB,,, | Performed by: INTERNAL MEDICINE

## 2019-01-14 PROCEDURE — 1101F PR PT FALLS ASSESS DOC 0-1 FALLS W/OUT INJ PAST YR: ICD-10-PCS | Mod: CPTII,S$GLB,, | Performed by: INTERNAL MEDICINE

## 2019-01-14 PROCEDURE — 3078F PR MOST RECENT DIASTOLIC BLOOD PRESSURE < 80 MM HG: ICD-10-PCS | Mod: CPTII,S$GLB,, | Performed by: INTERNAL MEDICINE

## 2019-01-14 PROCEDURE — 99999 PR PBB SHADOW E&M-EST. PATIENT-LVL III: ICD-10-PCS | Mod: PBBFAC,,, | Performed by: INTERNAL MEDICINE

## 2019-01-14 NOTE — PROGRESS NOTES
Subjective:   Patient ID:  Danyel Rangel is a 83 y.o. male who presents for follow-up of Follow-up      Problem List Items Addressed This Visit        Cardiac/Vascular    Chronic atrial fibrillation - Primary    HTN (hypertension), benign    Hyperlipidemia    AICD (automatic cardioverter/defibrillator) present    Coronary artery disease involving native coronary artery of native heart without angina pectoris    S/P CABG (coronary artery bypass graft)          HPI: Patient with PMH of CAD and CABG, AICD, HTN and HLD present for f/u. He is doing well with no chest pain or SOB. No orthopnea or PND. No dizziness or syncope. BP is controlled. HR is controlled with no palpitations.    No bleeding on eliquis. No shocks by AICD. He is compliant with medications and low salt diet. Weight is stable.   He is doing well with no complaints since previous visit.     Review of Systems   Constitution: Negative.   HENT: Negative.    Eyes: Negative.    Cardiovascular: Negative.    Respiratory: Negative.    Endocrine: Negative.    Hematologic/Lymphatic: Negative.    Skin: Negative.    Musculoskeletal: Negative.    Gastrointestinal: Negative.    Neurological: Negative.           Medication List           Accurate as of 1/14/19  1:18 PM. If you have any questions, ask your nurse or doctor.               CONTINUE taking these medications    allopurinol 300 MG tablet  Commonly known as:  ZYLOPRIM  TAKE 1 TABLET ONE TIME DAILY     ELIQUIS 5 mg Tab  Generic drug:  apixaban  TAKE 1 TABLET TWICE DAILY     finasteride 5 mg tablet  Commonly known as:  PROSCAR  Take 1 tablet (5 mg total) by mouth once daily.     furosemide 20 MG tablet  Commonly known as:  LASIX  TAKE 1 TABLET TWICE DAILY     levothyroxine 50 MCG tablet  Commonly known as:  SYNTHROID  TAKE 1 TABLET ONE TIME DAILY     lisinopril 20 MG tablet  Commonly known as:  PRINIVIL,ZESTRIL  TAKE 1 TABLET ONE TIME DAILY     metoprolol succinate 100 MG 24 hr tablet  Commonly known as:   TOPROL-XL  Take 1 tablet (100 mg total) by mouth once daily.     potassium chloride SA 10 MEQ tablet  Commonly known as:  K-DUR,KLOR-CON  TAKE 1 TABLET ONE TIME DAILY     simvastatin 20 MG tablet  Commonly known as:  ZOCOR  TAKE 1 TABLET EVERY EVENING            Objective:   Physical Exam   Constitutional: He is oriented to person, place, and time. He appears well-developed and well-nourished. No distress.   Examination of the digits showed no clubbing or cyanosis   HENT:   Head: Normocephalic and atraumatic.   Eyes: Conjunctivae are normal. Pupils are equal, round, and reactive to light. Right eye exhibits no discharge.   Neck: Normal range of motion. Neck supple. No JVD present. No thyromegaly present.   No carotid bruits   Cardiovascular: Normal rate, regular rhythm, S1 normal, S2 normal, normal heart sounds, intact distal pulses and normal pulses. PMI is not displaced. Exam reveals no gallop, no friction rub and no opening snap.   No murmur heard.  Pulmonary/Chest: Effort normal and breath sounds normal. No respiratory distress. He has no wheezes. He has no rales. He exhibits no tenderness.   Abdominal: Soft. Bowel sounds are normal. He exhibits no distension and no mass. There is no tenderness. There is no guarding.   No hepatosplenomegaly   Musculoskeletal: Normal range of motion. He exhibits no edema or tenderness.   Lymphadenopathy:     He has no cervical adenopathy.   Neurological: He is alert and oriented to person, place, and time.   Skin: Skin is warm. No rash noted. He is not diaphoretic. No erythema.   Psychiatric: He has a normal mood and affect.   Nursing note and vitals reviewed.      ECGs reviewed-ventricular paced rhythm  LABS reviewed  Imaging including Echoes reviewed    Assessment:     1. Chronic atrial fibrillation    2. HTN (hypertension), benign    3. Mixed hyperlipidemia    4. AICD (automatic cardioverter/defibrillator) present    5. S/P CABG (coronary artery bypass graft)    6. Coronary  artery disease involving native coronary artery of native heart without angina pectoris        Plan:     Continue current medications  Low salt diet  Activity as tolerated  ICd interrogation  F/u in 6 months

## 2019-04-05 DIAGNOSIS — N18.9 CHRONIC KIDNEY DISEASE, UNSPECIFIED CKD STAGE: ICD-10-CM

## 2019-05-28 ENCOUNTER — CLINICAL SUPPORT (OUTPATIENT)
Dept: CARDIOLOGY | Facility: CLINIC | Age: 84
End: 2019-05-28
Payer: MEDICARE

## 2019-05-28 DIAGNOSIS — Z95.1 S/P CABG (CORONARY ARTERY BYPASS GRAFT): ICD-10-CM

## 2019-05-28 DIAGNOSIS — I48.20 CHRONIC ATRIAL FIBRILLATION: ICD-10-CM

## 2019-05-28 DIAGNOSIS — Z95.810 AICD (AUTOMATIC CARDIOVERTER/DEFIBRILLATOR) PRESENT: Primary | ICD-10-CM

## 2019-05-28 PROCEDURE — 93295 DEV INTERROG REMOTE 1/2/MLT: CPT | Mod: S$GLB,,, | Performed by: INTERNAL MEDICINE

## 2019-05-28 PROCEDURE — 99499 NO LOS: ICD-10-PCS | Mod: S$GLB,,, | Performed by: INTERNAL MEDICINE

## 2019-05-28 PROCEDURE — 93296 REM INTERROG EVL PM/IDS: CPT | Mod: S$GLB,,, | Performed by: INTERNAL MEDICINE

## 2019-05-28 PROCEDURE — 99499 UNLISTED E&M SERVICE: CPT | Mod: S$GLB,,, | Performed by: INTERNAL MEDICINE

## 2019-05-28 PROCEDURE — 93295 PR INTERROG EVAL, REMOTE, UP TO 90 DAYS,CARDVERT/DEFIB: ICD-10-PCS | Mod: S$GLB,,, | Performed by: INTERNAL MEDICINE

## 2019-05-28 PROCEDURE — 93296 PR INTERROG EVAL, REMOTE, UP TO 90 DAYS, PACER/DEFIB,TECH REVIEW: ICD-10-PCS | Mod: S$GLB,,, | Performed by: INTERNAL MEDICINE

## 2019-06-03 NOTE — TELEPHONE ENCOUNTER
----- Message from Tawny Alonso sent at 6/3/2019  8:06 AM CDT -----  Need refill for Finasteride 5 mg 1 po daily. 90 day supply.  Please send to [facility] mail order.  Patient also wants to know should he be taking Metoprolol  mg while he is currently taking Finasteride?

## 2019-06-04 NOTE — TELEPHONE ENCOUNTER
----- Message from Tawny Alonso sent at 6/3/2019  8:06 AM CDT -----  Need refill for Finasteride 5 mg 1 po daily. 90 day supply.  Please send to [facility] mail order.  Patient also wants to know should he be taking Metoprolol  mg while he is currently taking Finasteride?

## 2019-06-12 NOTE — PROGRESS NOTES
Pacemaker Evaluation Report    June 12, 2019      : [employer] Model: Ilestro 7DR-T  Implant date: 3/12/2015    Reason for evaluation:routine        Diagnostic Data    Mode switch: 1  Underlying Rhythm:Chronic Atrial fib  Battery status: satisfactory  Voltage: 3.1 V      Final Parameters  Mode: DDDR Lower rate: 60 bpm Upper rate: 130bpm    Changes made: none  Conclusions: Normal ICD function    Follow up: in 3 months    Complete report is scanned into [employer]

## 2019-08-27 NOTE — TELEPHONE ENCOUNTER
----- Message from Costa Ferreira MD sent at 8/27/2019  2:25 PM CDT -----      Please     He needs an echo before his next follow up    Switch him to me     ZN

## 2019-08-27 NOTE — PROGRESS NOTES
ICD Evaluation Report    August 27, 2019    Primary MD: Dr. Spence  Primary Cardiologist: Dr. Melgar  : [employer] Model: Ilesto 7 DR-T DF-1/Serial No.  [id number]  Implant date: 3/12/2015  Reason for evaluation:routine  Indication for ICD: Ischemic Cardiomyopathy    Measurements  Atrial sensing - P wave: 2.8 mV  Atrial capture: Maintained: n/a:  Afib   Atrial lead impedance: 477 ohms  Ventricular sensing - R wave: CHB  Ventricular capture: RV Maintained: 0.5 V @ 0.4 ms   Ventricular lead impedance: RV:  462 ohms;   Shock Impedance:  79 ohms  Underlying rhythm: Complete Heart Block      Diagnostic Data  Atrial paced: 0 % Ventricular paced: 99 %  Mode switch: 100% Afib  Other: n/a  Ventricular Events: none  Battery status: satisfactory 57% longevity     VT Zones and Therapies:  VT-1 zone:  171 bpm to 213 bpm   Therapies: monitor  VF zone:  > 214 bpm   Therapies: Burst then 40J x 8    Final Parameters  Mode: DDDR Lower rate: 60 bpm Upper rate: 130 bpm  AV Delay: 200/160 ms Mode Switch: On Rate Response: CLS  Atrial - Amplitude: 2.5 V Pulse width: 0.4 ms Sensitivity: 0.4 mV   Polarity: Bipolar  Ventricular - Amplitude: 2.0 V Pulse width: 0.4 ms Sensitivity: 0.8 mV   Polarity: Bipolar  Changes made: none  Conclusions: normal device function    Follow up: Evaluate whether upgrade to CRT-D device indicated due to RV pacing > 40%      Costa Ferreira

## 2019-09-18 NOTE — PATIENT INSTRUCTIONS
Heart Disease Education    The heart beats 60 to 100 times per minute, 24 hours a day. This equals almost 1000,000 times a day. It pumps blood with oxygen and nutrients to the tissues and organs of the body. But the heart is a muscle and needs its own supply of blood. Blood flow to the heart is supplied by the coronary arteries. Coronary artery disease (atherosclerosis) is a result of cholesterol, saturated fat, and calcium deposits (plaques) that build up inside the walls. This causes inflammation within the coronary arteries. These plaques narrow the artery and reduce blood flow to the heart muscle. The reduction in blood flow to the heart muscle decreases oxygen supply to the heart. If the narrowing is significant enough, the oxygen supply to one or more regions of the heart can be temporarily or permanently shut down. This can cause chest pain, and possibly death of heart tissue (heart attack).  Types of chest pain  Angina is the name for pain in the heart muscle. Angina is a warning sign of serious heart disease. When untreated it can lead to a heart attack, also known as acute myocardial infarction, or AMI. Angina occurs when there is not enough blood and oxygen flowing to the heart for the amount of work it is doing. This most often happens during physical exertion, when the heart is working hardest. It is usually relieved by rest or nitroglycerin. Angina may also occur after a large meal when extra blood is sent to the digestive organs and less goes to the heart. In the case of advanced or unstable heart disease, angina can occur at rest or awaken you from sleep. Angina usually lasts from a few minutes up to 20 minutes or more. When treated early, the effects of angina can be reversed without permanent damage to the heart. Angina is a serious condition and needs to be evaluated by a medical professional immediately.  There are two types of angina -- stable and unstable:  · Stable angina usually occurs  with a predictable level of activity. Being stable, its character, severity, and occurrence do not change much over time. It usually starts with activity, and resolves with rest or taking your medicine as instructed by your doctor. The symptoms usually do not last long.  · Unstable angina changes or gets worse over time. It is different from whatever you are used to. It may feel different or worse, begin without cause, occur with exercise or exertion, wake you up from sleep, and last longer. It may not respond in the same way as it does when you take your usual medicines for an attack. This type of angina can be a warning sign of an impending heart attack.     A heart attack is usually the result of a blood clot that suddenly forms in a coronary artery that has been narrowed with plaque. When this occurs, blood flow may be cut off to a part of the heart muscle, causing the cells to die. This weakens the pumping action of the heart, which affects the delivery of blood to all the other organs in the body including the brain. This damage is not reversible. However, early treatment can limit the amount of damage.  The pain you feel with angina and a heart attack may have a similar quality. However, it is usually different in intensity and duration. Here are some typical descriptions of a heart attack:  · It is most often experienced as a squeezing, crushing, pressure-like sensation in the center of the chest.  · It is sometimes described as something heavy sitting on my chest.  · It may feel more like a bad case of indigestion.  · The pain may spread from the chest to the arm, shoulder, throat or jaw.  · Sometimes the pain is not felt in the chest at all, but only in the arm, shoulder, throat or jaw.  · There may also be nausea, vomiting, dizziness or light-headedness, sweating and trouble breathing.  · Palpitations, or your heart beating rapidly  · A new, irregular heart beat  · Unexplained weakness  You may not be  "able to tell the difference between "bad" angina and a heart attack at home. Seek help if your symptoms are different than usual. Do not be in denial or just try to "tough it out."  Call 911  This is the fastest and safest way to get to the emergency department. The paramedics can also start treatment on the way to the hospital, saving valuable time for your heart.  · If the angina gets worse, if it continues, or if it stops and returns, call 911 immediately. Do not delay. You may be having a heart attack.  · After you call 911, take a second tablet or spray unless instructed otherwise. When repeating doses, sit down if possible, because it can make you feel lightheaded or dizzy. Wait another 5 minutes. If the angina still does not go away, take a third tablet or spray. Do not take more than 3 tablets or sprays within 15 minutes. Stay on the phone with 911 for further instruction.  · Your healthcare provider may give you slightly different instructions than those above. If so, follow them carefully.  Do not wait until symptoms become severe to call 911.  Other reasons to call 911 include:  · Trouble breathing  · Feeling lightheaded, faint, or dizzy  · Rapid heart beat  · Slower than usual heart rate compared to your normal  · Angina with weakness, dizziness, fainting, heavy sweating, nausea, or vomiting  · Extreme drowsiness, confusion  · Weakness of an arm or leg or one side of the face  · Difficulty with speech or vision  When to seek medical care  Remember, the signs and symptoms of a heart attack are not always like they are on TV. Sometimes they are not so obvious. You may only feel weak, or just not right. If it is not clear or if you have any doubt, call for advice.  · Seek help if there is a change in the type of pain, if it feels different, or if your symptoms are mild.  · Do not drive yourself. Have someone else drive you. If no one can drive, call 911.  · Do not delay. Fast diagnosis and treatment can " "prevent or limit the amount of heart damage during a heart attack.  · Do not go to your doctor's office or a clinic as they may not be able to provide all the testing and treatment required for this condition.  · If your doctor has given you medicine to take when symptoms occur, take them but don't delay getting help trying to locate medicines.  What happens in the emergency department  The emergency department is connected to your local emergency medical system (EMS) through 911. That's why during a cardiac emergency, calling 911 is the fastest way to get help. The goal of the emergency department is to rapidly screen, evaluate, and treat people.  Once you are there, an electrocardiogram (ECG or heart tracing) will be done. Blood samples may be taken to look for the presence of heart enzymes that leak from damaged heart cells and show if a heart attack is occurring. You will often be evaluated by a heart specialist (cardiologist) who decides the best course of action. In the case of severe angina or early heart attack, and depending on the circumstances, powerful "clot busting" medicines can be used to dissolve blood clots in the coronary artery. In other cases, you may be taken to a cardiac catheterization lab. Here, a tiny balloon-tipped catheter is advanced through blood vessels to the heart. There the balloon is inflated pushing open the blood vessel restoring blood flow.  Risk factors for heart disease  Risk factors for heart disease are a combination of genetic and lifestyle. Many risk factors work by either directly or indirectly damaging the blood vessels of the heart, or by increasing the risk of forming blood or cholesterol clots, which then clog up and block the arteries.     Examples of physical lifestyle risk factors:  · Cigarette smoking  · High blood pressure  · High blood cholesterol  · Use of stimulant drugs such as cocaine, crack, and amphetamines  · Eating a high-fat, high-cholesterol " meal  · Diabetes   · Obesity which increases risk for diabetes and high blood pressure  · Lack of regular physical activity     Examples of emotional lifestyle factors:  · Chronic high stress levels release stress hormones. These raise blood pressure and cholesterol level and makes blood clot more easily.  · Held-in anger, hostile or cynical attitude  · Social and emotional isolation, lack of intimacy  · Loss of relationship  · Depression  Other factors that increase the risk of heart attack that you cannot control :  · Age. The older you get beyond 40, the greater is your risk of significant coronary artery disease.  · Gender. More men than women get heart disease; but once past menopause, women who are not taking estrogen replacement have the same risk as men for a heart attack.  · Family history. If your mother, father, brother or sister has coronary artery disease, your risk of having it is higher than a person your age without this family history.  What can you do to decrease your risk  To reduce your risk of heart disease:  · Get regular checkups with your doctor.  · Take your medicines for blood pressure, cholesterol or diabetes as directed.  · Watch your diet. Eat a heart healthy diet choosing fresh foods, less salt, cholesterol, and fat  · Stop smoking. Get help if needed.  · Get regular exercise.  · Manage stress.  · Carry a list of medicines and doses in your wallet.  Date Last Reviewed: 12/30/2015  © 8656-4446 [employer]. [address] [place] [address], [place] [address]. All rights reserved. This information is not intended as a substitute for professional medical care. Always follow your healthcare professional's instructions.

## 2019-09-18 NOTE — PROGRESS NOTES
Subjective:   Patient ID:  Danyel Rangel is a 84 y.o. male who presents for follow up of Coronary Artery Disease; Hyperlipidemia; Hypertension; ICD; and EF 55% (2016) down to 45%      HPI:       Danyel Rangel 84 y.o. male is here follow up and feeling well without any new complaints. He has CAD s/p CABG with a previously normalized EF (echo 2016) and now EF 45%  (echo 8/219), 90% RV pacing on dual lead ICD, HTN, HLP, and CKD III.         Echo 12/2016     Normal EF   Bi-atrial enlargement   PASP 31 mmHg           Echo 9/2019     EF 45%   Severe LAE   Mod AI   Mild AS   Severe TR   Aortic valve mean gradient 5 mmHg   Diastolic dysfunction      ICD interrogation 7/2019 (see above)            Patient Active Problem List    Diagnosis Date Noted    CKD (chronic kidney disease), stage III 10/25/2018    Benign prostatic hyperplasia with weak urinary stream 10/25/2018    Senile purpura 04/25/2018    S/P CABG (coronary artery bypass graft) 01/27/2017    Hypothyroidism 11/10/2016    Abnormal LFTs 11/10/2016    Trigger index finger of right hand 11/10/2016    Coronary artery disease involving native coronary artery of native heart without angina pectoris 10/14/2016    Chronic atrial fibrillation 06/13/2016    HTN (hypertension), benign 06/13/2016    Hyperlipidemia 06/13/2016    AICD (automatic cardioverter/defibrillator) present 06/13/2016           Right Arm BP - Sitting: (P) 100/62  Left Arm BP - Sitting: (P) 97/58        LABS      LAST HbA1c  Lab Results   Component Value Date    HGBA1C 5.5 04/26/2018       Lipid panel  Lab Results   Component Value Date    CHOL 107 (L) 04/26/2018    CHOL 131 11/08/2016     Lab Results   Component Value Date    HDL 45 04/26/2018    HDL 54 11/08/2016     Lab Results   Component Value Date    LDLCALC 50.8 (L) 04/26/2018    LDLCALC 67.4 11/08/2016     Lab Results   Component Value Date    TRIG 56 04/26/2018    TRIG 48 11/08/2016     Lab Results   Component Value Date    CHOLHDL  42.1 04/26/2018    CHOLHDL 41.2 11/08/2016            ROS    Objective:   Physical Exam    Assessment:     1. AICD (automatic cardioverter/defibrillator) present    2. Mixed hyperlipidemia    3. HTN (hypertension), benign    4. Chronic atrial fibrillation    5. Coronary artery disease involving native coronary artery of native heart without angina pectoris    6. S/P CABG (coronary artery bypass graft)    7. CKD (chronic kidney disease), stage III    8. Chronic ischemic heart disease        Plan:         MUGA scan for accurate EF assessment   If EF < 50% he will be referred to EP for evaluation of BiV CRT upgrade in view > 90% RV pacing        Stress test to rule out ischemia if EF is truly < 55%            Continue with current medical plan and lifestyle changes.  Return sooner for concerns or questions. If symptoms persist go to the ED  I have reviewed all pertinent data on this patient       I have reviewed the patient's medical history in detail and updated the computerized patient record.    Orders Placed This Encounter   Procedures    NM Cardiac Blood Pool Single study MUGA     Standing Status:   Future     Standing Expiration Date:   9/18/2020     Order Specific Question:   May the Radiologist modify the order per protocol to meet the clinical needs of the patient?     Answer:   Yes    Ambulatory Referral to Electrophysiology     Referral Priority:   Routine     Referral Type:   Consultation     Referral Reason:   Specialty Services Required     Referred to Provider:   Shay Benavides MD     Requested Specialty:   Electrophysiology     Number of Visits Requested:   1       Follow up as scheduled. Return sooner for concerns or questions            He expressed verbal understanding and agreed with the plan        Patient's Medications   New Prescriptions    No medications on file   Previous Medications    ALLOPURINOL (ZYLOPRIM) 300 MG TABLET    TAKE 1 TABLET ONE TIME DAILY    ELIQUIS 5 MG TAB    TAKE 1 TABLET  TWICE DAILY    FINASTERIDE (PROSCAR) 5 MG TABLET    Take 1 tablet (5 mg total) by mouth once daily.    FUROSEMIDE (LASIX) 20 MG TABLET    TAKE 1 TABLET TWICE DAILY    LEVOTHYROXINE (SYNTHROID) 50 MCG TABLET    TAKE 1 TABLET ONE TIME DAILY    LISINOPRIL (PRINIVIL,ZESTRIL) 20 MG TABLET    TAKE 1 TABLET ONE TIME DAILY    METOPROLOL SUCCINATE (TOPROL-XL) 100 MG 24 HR TABLET    Take 1 tablet (100 mg total) by mouth once daily.    POTASSIUM CHLORIDE SA (K-DUR,KLOR-CON) 10 MEQ TABLET    TAKE 1 TABLET ONE TIME DAILY    SIMVASTATIN (ZOCOR) 20 MG TABLET    TAKE 1 TABLET EVERY EVENING   Modified Medications    No medications on file   Discontinued Medications    No medications on file

## 2019-10-31 NOTE — PROGRESS NOTES
Subjective:      Patient ID: Danyel Rangel is a 84 y.o. male.    Chief Complaint: No chief complaint on file.    HPI:    [employer] remote AICD interrogation report downloaded and prepared by medical assistant and interpreted by me and full report scanned into Epic media.  Battery OK.   Leads OK.  Known chronic a fib on Eliquis..  No VT or VF.  Normal device function      Past Medical History:   Diagnosis Date    Atrial fibrillation     Coronary artery disease     Encounter for blood transfusion     Hyperlipidemia     Hypertension         Past Surgical History:   Procedure Laterality Date    CARDIAC CATHETERIZATION  2016    CORONARY ARTERY BYPASS GRAFT  2006    TOTAL KNEE ARTHROPLASTY Left 2010            No family history on file.    Social History     Socioeconomic History    Marital status:      Spouse name: Not on file    Number of children: Not on file    Years of education: Not on file    Highest education level: Not on file   Occupational History    Not on file   Social Needs    Financial resource strain: Not on file    Food insecurity:     Worry: Not on file     Inability: Not on file    Transportation needs:     Medical: Not on file     Non-medical: Not on file   Tobacco Use    Smoking status: Never Smoker    Smokeless tobacco: Never Used   Substance and Sexual Activity    Alcohol use: No    Drug use: No    Sexual activity: Not on file   Lifestyle    Physical activity:     Days per week: Not on file     Minutes per session: Not on file    Stress: Not on file   Relationships    Social connections:     Talks on phone: Not on file     Gets together: Not on file     Attends Zoroastrianism service: Not on file     Active member of club or organization: Not on file     Attends meetings of clubs or organizations: Not on file     Relationship status: Not on file   Other Topics Concern    Not on file   Social History Narrative    Not on file       Current Outpatient Medications on  File Prior to Visit   Medication Sig Dispense Refill    allopurinol (ZYLOPRIM) 300 MG tablet TAKE 1 TABLET EVERY DAY 90 tablet 4    ELIQUIS 5 mg Tab TAKE 1 TABLET TWICE DAILY 180 tablet 0    finasteride (PROSCAR) 5 mg tablet Take 1 tablet (5 mg total) by mouth once daily. 90 tablet 3    furosemide (LASIX) 20 MG tablet TAKE 1 TABLET TWICE DAILY 180 tablet 3    levothyroxine (SYNTHROID) 50 MCG tablet TAKE 1 TABLET EVERY DAY 90 tablet 4    lisinopril (PRINIVIL,ZESTRIL) 20 MG tablet TAKE 1 TABLET EVERY DAY 90 tablet 3    metoprolol succinate (TOPROL-XL) 100 MG 24 hr tablet TAKE 1 TABLET (100 MG TOTAL) BY MOUTH ONCE DAILY. 90 tablet 4    potassium chloride SA (K-DUR,KLOR-CON) 10 MEQ tablet TAKE 1 TABLET EVERY DAY 90 tablet 3    simvastatin (ZOCOR) 20 MG tablet TAKE 1 TABLET EVERY EVENING 90 tablet 3     No current facility-administered medications on file prior to visit.        Review of patient's allergies indicates:   Allergen Reactions    Xarelto [rivaroxaban] Anaphylaxis     Objective:   There were no vitals filed for this visit.     Physical Exam     Assessment:     1. AICD (automatic cardioverter/defibrillator) present    2. Chronic atrial fibrillation      Plan:   Diagnoses and all orders for this visit:    AICD (automatic cardioverter/defibrillator) present    Chronic atrial fibrillation         No follow-ups on file.

## 2020-01-01 ENCOUNTER — OFFICE VISIT (OUTPATIENT)
Dept: INTERNAL MEDICINE | Facility: CLINIC | Age: 85
End: 2020-01-01
Payer: MEDICARE

## 2020-01-01 ENCOUNTER — TELEPHONE (OUTPATIENT)
Dept: FAMILY MEDICINE | Facility: CLINIC | Age: 85
End: 2020-01-01

## 2020-01-01 ENCOUNTER — CLINICAL SUPPORT (OUTPATIENT)
Dept: CARDIOLOGY | Facility: CLINIC | Age: 85
End: 2020-01-01
Payer: MEDICARE

## 2020-01-01 ENCOUNTER — ANESTHESIA EVENT (OUTPATIENT)
Dept: ENDOSCOPY | Facility: HOSPITAL | Age: 85
DRG: 444 | End: 2020-01-01
Payer: MEDICARE

## 2020-01-01 ENCOUNTER — HOSPITAL ENCOUNTER (EMERGENCY)
Facility: HOSPITAL | Age: 85
Discharge: HOME | End: 2020-05-21
Attending: EMERGENCY MEDICINE
Payer: MEDICARE

## 2020-01-01 ENCOUNTER — PATIENT OUTREACH (OUTPATIENT)
Dept: ADMINISTRATIVE | Facility: OTHER | Age: 85
End: 2020-01-01

## 2020-01-01 ENCOUNTER — TELEPHONE (OUTPATIENT)
Dept: CARDIOLOGY | Facility: CLINIC | Age: 85
End: 2020-01-01

## 2020-01-01 ENCOUNTER — HOSPITAL ENCOUNTER (INPATIENT)
Facility: HOSPITAL | Age: 85
LOS: 5 days | Discharge: HOSPICE | DRG: 444 | End: 2020-05-28
Attending: EMERGENCY MEDICINE | Admitting: SURGERY
Payer: MEDICARE

## 2020-01-01 ENCOUNTER — HOSPITAL ENCOUNTER (OUTPATIENT)
Dept: RADIOLOGY | Facility: HOSPITAL | Age: 85
Discharge: HOME | DRG: 444 | End: 2020-05-22
Attending: INTERNAL MEDICINE
Payer: MEDICARE

## 2020-01-01 ENCOUNTER — ANESTHESIA (OUTPATIENT)
Dept: ENDOSCOPY | Facility: HOSPITAL | Age: 85
DRG: 444 | End: 2020-01-01
Payer: MEDICARE

## 2020-01-01 ENCOUNTER — TELEPHONE (OUTPATIENT)
Dept: CARDIOLOGY | Facility: HOSPITAL | Age: 85
End: 2020-01-01

## 2020-01-01 ENCOUNTER — OFFICE VISIT (OUTPATIENT)
Dept: CARDIOLOGY | Facility: CLINIC | Age: 85
End: 2020-01-01
Payer: MEDICARE

## 2020-01-01 VITALS
TEMPERATURE: 98 F | WEIGHT: 169 LBS | BODY MASS INDEX: 24.2 KG/M2 | HEIGHT: 70 IN | OXYGEN SATURATION: 98 % | SYSTOLIC BLOOD PRESSURE: 118 MMHG | DIASTOLIC BLOOD PRESSURE: 60 MMHG | HEART RATE: 76 BPM

## 2020-01-01 VITALS
RESPIRATION RATE: 20 BRPM | WEIGHT: 167 LBS | HEIGHT: 70 IN | TEMPERATURE: 98 F | SYSTOLIC BLOOD PRESSURE: 112 MMHG | OXYGEN SATURATION: 100 % | DIASTOLIC BLOOD PRESSURE: 65 MMHG | HEART RATE: 70 BPM | BODY MASS INDEX: 23.91 KG/M2

## 2020-01-01 VITALS
BODY MASS INDEX: 27.3 KG/M2 | TEMPERATURE: 98 F | DIASTOLIC BLOOD PRESSURE: 51 MMHG | RESPIRATION RATE: 15 BRPM | WEIGHT: 180.13 LBS | SYSTOLIC BLOOD PRESSURE: 81 MMHG | HEART RATE: 70 BPM | HEIGHT: 68 IN | OXYGEN SATURATION: 93 %

## 2020-01-01 VITALS
WEIGHT: 163 LBS | BODY MASS INDEX: 24.78 KG/M2 | HEART RATE: 67 BPM | SYSTOLIC BLOOD PRESSURE: 117 MMHG | DIASTOLIC BLOOD PRESSURE: 69 MMHG

## 2020-01-01 DIAGNOSIS — M1A.9XX0 CHRONIC GOUT WITHOUT TOPHUS, UNSPECIFIED CAUSE, UNSPECIFIED SITE: ICD-10-CM

## 2020-01-01 DIAGNOSIS — R10.11 RUQ PAIN: ICD-10-CM

## 2020-01-01 DIAGNOSIS — N18.30 CKD (CHRONIC KIDNEY DISEASE), STAGE III: Primary | ICD-10-CM

## 2020-01-01 DIAGNOSIS — N40.1 BENIGN PROSTATIC HYPERPLASIA WITH WEAK URINARY STREAM: ICD-10-CM

## 2020-01-01 DIAGNOSIS — I10 HTN (HYPERTENSION), BENIGN: ICD-10-CM

## 2020-01-01 DIAGNOSIS — Z71.89 GOALS OF CARE, COUNSELING/DISCUSSION: ICD-10-CM

## 2020-01-01 DIAGNOSIS — Z95.810 AICD (AUTOMATIC CARDIOVERTER/DEFIBRILLATOR) PRESENT: Primary | ICD-10-CM

## 2020-01-01 DIAGNOSIS — Z71.89 COUNSELING REGARDING ADVANCE CARE PLANNING AND GOALS OF CARE: ICD-10-CM

## 2020-01-01 DIAGNOSIS — R79.89 ABNORMAL LFTS: ICD-10-CM

## 2020-01-01 DIAGNOSIS — N18.30 CKD (CHRONIC KIDNEY DISEASE), STAGE III: ICD-10-CM

## 2020-01-01 DIAGNOSIS — I25.10 CORONARY ARTERY DISEASE INVOLVING NATIVE CORONARY ARTERY OF NATIVE HEART WITHOUT ANGINA PECTORIS: ICD-10-CM

## 2020-01-01 DIAGNOSIS — R06.02 SOB (SHORTNESS OF BREATH): ICD-10-CM

## 2020-01-01 DIAGNOSIS — Z51.5 PALLIATIVE CARE ENCOUNTER: ICD-10-CM

## 2020-01-01 DIAGNOSIS — Z95.1 S/P CABG (CORONARY ARTERY BYPASS GRAFT): ICD-10-CM

## 2020-01-01 DIAGNOSIS — R39.12 BENIGN PROSTATIC HYPERPLASIA WITH WEAK URINARY STREAM: ICD-10-CM

## 2020-01-01 DIAGNOSIS — I48.20 CHRONIC ATRIAL FIBRILLATION: ICD-10-CM

## 2020-01-01 DIAGNOSIS — R33.9 URINARY RETENTION: ICD-10-CM

## 2020-01-01 DIAGNOSIS — S80.811A ABRASION OF RIGHT LOWER EXTREMITY, INITIAL ENCOUNTER: ICD-10-CM

## 2020-01-01 DIAGNOSIS — S01.01XA LACERATION OF SCALP, INITIAL ENCOUNTER: ICD-10-CM

## 2020-01-01 DIAGNOSIS — I50.22 CHRONIC SYSTOLIC CONGESTIVE HEART FAILURE: ICD-10-CM

## 2020-01-01 DIAGNOSIS — R10.84 GENERALIZED ABDOMINAL PAIN: ICD-10-CM

## 2020-01-01 DIAGNOSIS — N17.9 AKI (ACUTE KIDNEY INJURY): ICD-10-CM

## 2020-01-01 DIAGNOSIS — E03.9 HYPOTHYROIDISM, UNSPECIFIED TYPE: ICD-10-CM

## 2020-01-01 DIAGNOSIS — R93.3 ABNORMAL FINDING ON GI TRACT IMAGING: ICD-10-CM
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
## 2020-01-01 DIAGNOSIS — S09.90XA ACUTE HEAD INJURY, INITIAL ENCOUNTER: Primary | ICD-10-CM

## 2020-01-01 DIAGNOSIS — K81.9 CHOLECYSTITIS WITHOUT CHOLELITHIASIS: Primary | ICD-10-CM

## 2020-01-01 DIAGNOSIS — R79.89 ELEVATED BRAIN NATRIURETIC PEPTIDE (BNP) LEVEL: ICD-10-CM

## 2020-01-01 DIAGNOSIS — R06.02 SHORTNESS OF BREATH: ICD-10-CM

## 2020-01-01 DIAGNOSIS — Z95.810 AICD (AUTOMATIC CARDIOVERTER/DEFIBRILLATOR) PRESENT: ICD-10-CM

## 2020-01-01 LAB
ALBUMIN SERPL BCP-MCNC: 2.3 G/DL (ref 3.5–5.2)
ALBUMIN SERPL BCP-MCNC: 2.3 G/DL (ref 3.5–5.2)
ALBUMIN SERPL BCP-MCNC: 2.4 G/DL (ref 3.5–5.2)
ALBUMIN SERPL BCP-MCNC: 2.5 G/DL (ref 3.5–5.2)
ALBUMIN SERPL BCP-MCNC: 2.5 G/DL (ref 3.5–5.2)
ALBUMIN SERPL BCP-MCNC: 2.6 G/DL (ref 3.5–5.2)
ALBUMIN SERPL BCP-MCNC: 3 G/DL (ref 3.5–5.2)
ALBUMIN SERPL BCP-MCNC: 3.8 G/DL (ref 3.5–5.2)
ALBUMIN SERPL BCP-MCNC: 4.1 G/DL (ref 3.5–5.2)
ALLENS TEST: ABNORMAL
ALLENS TEST: ABNORMAL
ALP SERPL-CCNC: 51 U/L (ref 55–135)
ALP SERPL-CCNC: 52 U/L (ref 55–135)
ALP SERPL-CCNC: 56 U/L (ref 55–135)
ALP SERPL-CCNC: 58 U/L (ref 38–126)
ALP SERPL-CCNC: 60 U/L (ref 38–126)
ALP SERPL-CCNC: 62 U/L (ref 55–135)
ALT SERPL W/O P-5'-P-CCNC: 16 U/L (ref 10–44)
ALT SERPL W/O P-5'-P-CCNC: 19 U/L (ref 10–44)
ALT SERPL W/O P-5'-P-CCNC: 20 U/L (ref 10–44)
ALT SERPL W/O P-5'-P-CCNC: 21 U/L (ref 10–44)
ALT SERPL W/O P-5'-P-CCNC: 22 U/L (ref 10–44)
ALT SERPL W/O P-5'-P-CCNC: 22 U/L (ref 10–44)
ANION GAP SERPL CALC-SCNC: 10 MMOL/L (ref 8–16)
ANION GAP SERPL CALC-SCNC: 11 MMOL/L (ref 8–16)
ANION GAP SERPL CALC-SCNC: 12 MMOL/L (ref 8–16)
ANION GAP SERPL CALC-SCNC: 13 MMOL/L (ref 8–16)
ANION GAP SERPL CALC-SCNC: 14 MMOL/L (ref 8–16)
ANION GAP SERPL CALC-SCNC: 15 MMOL/L (ref 8–16)
ANION GAP SERPL CALC-SCNC: 7 MMOL/L (ref 8–16)
ANION GAP SERPL CALC-SCNC: 9 MMOL/L (ref 8–16)
ANISOCYTOSIS BLD QL SMEAR: SLIGHT
ANISOCYTOSIS BLD QL SMEAR: SLIGHT
AORTIC ROOT ANNULUS: 3.83 CM
AORTIC VALVE CUSP SEPERATION: 1.82 CM
APTT BLDCRRT: 29.6 SEC (ref 21–32)
AST SERPL-CCNC: 26 U/L (ref 10–40)
AST SERPL-CCNC: 27 U/L (ref 15–46)
AST SERPL-CCNC: 32 U/L (ref 10–40)
AST SERPL-CCNC: 40 U/L (ref 10–40)
AST SERPL-CCNC: 43 U/L (ref 10–40)
AST SERPL-CCNC: 43 U/L (ref 15–46)
AV INDEX (PROSTH): 0.54
AV MEAN GRADIENT: 5 MMHG
AV PEAK GRADIENT: 8 MMHG
AV VALVE AREA: 1.47 CM2
AV VELOCITY RATIO: 0.48
BACTERIA BLD CULT: ABNORMAL
BACTERIA BLD CULT: NORMAL
BASOPHILS # BLD AUTO: 0.01 K/UL (ref 0–0.2)
BASOPHILS # BLD AUTO: 0.01 K/UL (ref 0–0.2)
BASOPHILS # BLD AUTO: 0.02 K/UL (ref 0–0.2)
BASOPHILS # BLD AUTO: 0.03 K/UL (ref 0–0.2)
BASOPHILS # BLD AUTO: 0.03 K/UL (ref 0–0.2)
BASOPHILS NFR BLD: 0 % (ref 0–1.9)
BASOPHILS NFR BLD: 0.1 % (ref 0–1.9)
BASOPHILS NFR BLD: 0.3 % (ref 0–1.9)
BILIRUB DIRECT SERPL-MCNC: 2.3 MG/DL (ref 0.1–0.3)
BILIRUB DIRECT SERPL-MCNC: 2.6 MG/DL (ref 0.1–0.3)
BILIRUB SERPL-MCNC: 2.5 MG/DL (ref 0.1–1)
BILIRUB SERPL-MCNC: 4.1 MG/DL (ref 0.1–1)
BILIRUB SERPL-MCNC: 4.1 MG/DL (ref 0.1–1)
BILIRUB SERPL-MCNC: 5.5 MG/DL (ref 0.1–1)
BILIRUB SERPL-MCNC: 5.6 MG/DL (ref 0.1–1)
BILIRUB SERPL-MCNC: 6.2 MG/DL (ref 0.1–1)
BILIRUB SERPL-MCNC: NORMAL MG/DL
BILIRUB UR QL STRIP: ABNORMAL
BLOOD URINE, POC: NORMAL
BNP SERPL-MCNC: 465 PG/ML (ref 0–99)
BNP SERPL-MCNC: 534 PG/ML (ref 0–99)
BSA FOR ECHO PROCEDURE: 1.91 M2
BUN SERPL-MCNC: 25 MG/DL (ref 8–23)
BUN SERPL-MCNC: 25 MG/DL (ref 8–23)
BUN SERPL-MCNC: 28 MG/DL (ref 8–23)
BUN SERPL-MCNC: 28 MG/DL (ref 8–23)
BUN SERPL-MCNC: 29 MG/DL (ref 8–23)
BUN SERPL-MCNC: 29 MG/DL (ref 8–23)
BUN SERPL-MCNC: 32 MG/DL (ref 8–23)
BUN SERPL-MCNC: 32 MG/DL (ref 8–23)
BUN SERPL-MCNC: 34 MG/DL (ref 8–23)
BUN SERPL-MCNC: 34 MG/DL (ref 8–23)
BUN SERPL-MCNC: 37 MG/DL (ref 2–20)
BUN SERPL-MCNC: 39 MG/DL (ref 8–23)
BUN SERPL-MCNC: 45 MG/DL (ref 8–23)
BUN SERPL-MCNC: 51 MG/DL (ref 2–20)
BUN SERPL-MCNC: 54 MG/DL (ref 8–23)
BUN SERPL-MCNC: 56 MG/DL (ref 8–23)
BUN SERPL-MCNC: 57 MG/DL (ref 8–23)
BUN SERPL-MCNC: 60 MG/DL (ref 8–23)
BUN SERPL-MCNC: 62 MG/DL (ref 8–23)
BUN SERPL-MCNC: 64 MG/DL (ref 8–23)
BUN SERPL-MCNC: 68 MG/DL (ref 8–23)
BUN SERPL-MCNC: 71 MG/DL (ref 8–23)
BUN SERPL-MCNC: 76 MG/DL (ref 8–23)
BUN SERPL-MCNC: 77 MG/DL (ref 8–23)
BUN SERPL-MCNC: 78 MG/DL (ref 8–23)
BURR CELLS BLD QL SMEAR: ABNORMAL
BURR CELLS BLD QL SMEAR: ABNORMAL
CALCIUM SERPL-MCNC: 8 MG/DL (ref 8.7–10.5)
CALCIUM SERPL-MCNC: 8.1 MG/DL (ref 8.7–10.5)
CALCIUM SERPL-MCNC: 8.3 MG/DL (ref 8.7–10.5)
CALCIUM SERPL-MCNC: 8.4 MG/DL (ref 8.7–10.5)
CALCIUM SERPL-MCNC: 8.5 MG/DL (ref 8.7–10.5)
CALCIUM SERPL-MCNC: 8.6 MG/DL (ref 8.7–10.5)
CALCIUM SERPL-MCNC: 8.6 MG/DL (ref 8.7–10.5)
CALCIUM SERPL-MCNC: 8.7 MG/DL (ref 8.7–10.5)
CALCIUM SERPL-MCNC: 8.9 MG/DL (ref 8.7–10.5)
CHLORIDE SERPL-SCNC: 100 MMOL/L (ref 95–110)
CHLORIDE SERPL-SCNC: 101 MMOL/L (ref 95–110)
CHLORIDE SERPL-SCNC: 102 MMOL/L (ref 95–110)
CHLORIDE SERPL-SCNC: 103 MMOL/L (ref 95–110)
CHLORIDE SERPL-SCNC: 104 MMOL/L (ref 95–110)
CHLORIDE SERPL-SCNC: 105 MMOL/L (ref 95–110)
CHLORIDE SERPL-SCNC: 106 MMOL/L (ref 95–110)
CHLORIDE SERPL-SCNC: 106 MMOL/L (ref 95–110)
CHLORIDE SERPL-SCNC: 108 MMOL/L (ref 95–110)
CLARITY UR: ABNORMAL
CO2 SERPL-SCNC: 17 MMOL/L (ref 23–29)
CO2 SERPL-SCNC: 18 MMOL/L (ref 23–29)
CO2 SERPL-SCNC: 19 MMOL/L (ref 23–29)
CO2 SERPL-SCNC: 20 MMOL/L (ref 23–29)
CO2 SERPL-SCNC: 21 MMOL/L (ref 23–29)
CO2 SERPL-SCNC: 22 MMOL/L (ref 23–29)
CO2 SERPL-SCNC: 22 MMOL/L (ref 23–29)
CO2 SERPL-SCNC: 23 MMOL/L (ref 23–29)
CO2 SERPL-SCNC: 24 MMOL/L (ref 23–29)
CO2 SERPL-SCNC: 25 MMOL/L (ref 23–29)
CO2 SERPL-SCNC: 26 MMOL/L (ref 23–29)
COLOR UR: ABNORMAL
COLOR, POC UA: YELLOW
CREAT SERPL-MCNC: 1.4 MG/DL (ref 0.5–1.4)
CREAT SERPL-MCNC: 1.4 MG/DL (ref 0.5–1.4)
CREAT SERPL-MCNC: 1.5 MG/DL (ref 0.5–1.4)
CREAT SERPL-MCNC: 1.5 MG/DL (ref 0.5–1.4)
CREAT SERPL-MCNC: 1.6 MG/DL (ref 0.5–1.4)
CREAT SERPL-MCNC: 1.6 MG/DL (ref 0.5–1.4)
CREAT SERPL-MCNC: 1.7 MG/DL (ref 0.5–1.4)
CREAT SERPL-MCNC: 1.7 MG/DL (ref 0.5–1.4)
CREAT SERPL-MCNC: 1.8 MG/DL (ref 0.5–1.4)
CREAT SERPL-MCNC: 1.8 MG/DL (ref 0.5–1.4)
CREAT SERPL-MCNC: 1.91 MG/DL (ref 0.5–1.4)
CREAT SERPL-MCNC: 2.1 MG/DL (ref 0.5–1.4)
CREAT SERPL-MCNC: 2.3 MG/DL (ref 0.5–1.4)
CREAT SERPL-MCNC: 2.37 MG/DL (ref 0.5–1.4)
CREAT SERPL-MCNC: 2.7 MG/DL (ref 0.5–1.4)
CREAT SERPL-MCNC: 2.9 MG/DL (ref 0.5–1.4)
CREAT SERPL-MCNC: 2.9 MG/DL (ref 0.5–1.4)
CREAT SERPL-MCNC: 3.1 MG/DL (ref 0.5–1.4)
CREAT SERPL-MCNC: 3.4 MG/DL (ref 0.5–1.4)
CREAT SERPL-MCNC: 3.4 MG/DL (ref 0.5–1.4)
CREAT SERPL-MCNC: 3.8 MG/DL (ref 0.5–1.4)
CREAT SERPL-MCNC: 4.1 MG/DL (ref 0.5–1.4)
CREAT SERPL-MCNC: 4.4 MG/DL (ref 0.5–1.4)
CREAT SERPL-MCNC: 4.4 MG/DL (ref 0.5–1.4)
CREAT SERPL-MCNC: 4.5 MG/DL (ref 0.5–1.4)
CV ECHO LV RWT: 0.51 CM
DELSYS: ABNORMAL
DELSYS: ABNORMAL
DIFFERENTIAL METHOD: ABNORMAL
DOP CALC AO PEAK VEL: 1.45 M/S
DOP CALC AO VTI: 26.56 CM
DOP CALC LVOT AREA: 2.7 CM2
DOP CALC LVOT DIAMETER: 1.87 CM
DOP CALC LVOT PEAK VEL: 0.7 M/S
DOP CALC LVOT STROKE VOLUME: 39.14 CM3
DOP CALCLVOT PEAK VEL VTI: 14.26 CM
E WAVE DECELERATION TIME: 180.62 MSEC
E/A RATIO: 3.72
E/E' RATIO: 10.8 M/S
ECHO LV POSTERIOR WALL: 1.05 CM (ref 0.6–1.1)
EOSINOPHIL # BLD AUTO: 0 K/UL (ref 0–0.5)
EOSINOPHIL # BLD AUTO: 0.1 K/UL (ref 0–0.5)
EOSINOPHIL # BLD AUTO: 0.1 K/UL (ref 0–0.5)
EOSINOPHIL # BLD AUTO: 0.2 K/UL (ref 0–0.5)
EOSINOPHIL NFR BLD: 0 % (ref 0–8)
EOSINOPHIL NFR BLD: 0.2 % (ref 0–8)
EOSINOPHIL NFR BLD: 0.9 % (ref 0–8)
EOSINOPHIL NFR BLD: 1.2 % (ref 0–8)
ERYTHROCYTE [DISTWIDTH] IN BLOOD BY AUTOMATED COUNT: 14.2 % (ref 11.5–14.5)
ERYTHROCYTE [DISTWIDTH] IN BLOOD BY AUTOMATED COUNT: 14.6 % (ref 11.5–14.5)
ERYTHROCYTE [DISTWIDTH] IN BLOOD BY AUTOMATED COUNT: 14.7 % (ref 11.5–14.5)
ERYTHROCYTE [DISTWIDTH] IN BLOOD BY AUTOMATED COUNT: 14.7 % (ref 11.5–14.5)
ERYTHROCYTE [DISTWIDTH] IN BLOOD BY AUTOMATED COUNT: 14.8 % (ref 11.5–14.5)
ERYTHROCYTE [DISTWIDTH] IN BLOOD BY AUTOMATED COUNT: 14.8 % (ref 11.5–14.5)
ERYTHROCYTE [DISTWIDTH] IN BLOOD BY AUTOMATED COUNT: 14.9 % (ref 11.5–14.5)
EST. GFR  (AFRICAN AMERICAN): 13 ML/MIN/1.73 M^2
EST. GFR  (AFRICAN AMERICAN): 14 ML/MIN/1.73 M^2
EST. GFR  (AFRICAN AMERICAN): 16 ML/MIN/1.73 M^2
EST. GFR  (AFRICAN AMERICAN): 18 ML/MIN/1.73 M^2
EST. GFR  (AFRICAN AMERICAN): 18 ML/MIN/1.73 M^2
EST. GFR  (AFRICAN AMERICAN): 20 ML/MIN/1.73 M^2
EST. GFR  (AFRICAN AMERICAN): 22 ML/MIN/1.73 M^2
EST. GFR  (AFRICAN AMERICAN): 22 ML/MIN/1.73 M^2
EST. GFR  (AFRICAN AMERICAN): 24 ML/MIN/1.73 M^2
EST. GFR  (AFRICAN AMERICAN): 27.8 ML/MIN/1.73 M^2
EST. GFR  (AFRICAN AMERICAN): 29 ML/MIN/1.73 M^2
EST. GFR  (AFRICAN AMERICAN): 32 ML/MIN/1.73 M^2
EST. GFR  (AFRICAN AMERICAN): 36.1 ML/MIN/1.73 M^2
EST. GFR  (AFRICAN AMERICAN): 39 ML/MIN/1.73 M^2
EST. GFR  (AFRICAN AMERICAN): 39 ML/MIN/1.73 M^2
EST. GFR  (AFRICAN AMERICAN): 42 ML/MIN/1.73 M^2
EST. GFR  (AFRICAN AMERICAN): 42 ML/MIN/1.73 M^2
EST. GFR  (AFRICAN AMERICAN): 45 ML/MIN/1.73 M^2
EST. GFR  (AFRICAN AMERICAN): 45 ML/MIN/1.73 M^2
EST. GFR  (AFRICAN AMERICAN): 48 ML/MIN/1.73 M^2
EST. GFR  (AFRICAN AMERICAN): 48 ML/MIN/1.73 M^2
EST. GFR  (AFRICAN AMERICAN): 53 ML/MIN/1.73 M^2
EST. GFR  (AFRICAN AMERICAN): 53 ML/MIN/1.73 M^2
EST. GFR  (NON AFRICAN AMERICAN): 11 ML/MIN/1.73 M^2
EST. GFR  (NON AFRICAN AMERICAN): 12 ML/MIN/1.73 M^2
EST. GFR  (NON AFRICAN AMERICAN): 14 ML/MIN/1.73 M^2
EST. GFR  (NON AFRICAN AMERICAN): 16 ML/MIN/1.73 M^2
EST. GFR  (NON AFRICAN AMERICAN): 16 ML/MIN/1.73 M^2
EST. GFR  (NON AFRICAN AMERICAN): 17 ML/MIN/1.73 M^2
EST. GFR  (NON AFRICAN AMERICAN): 19 ML/MIN/1.73 M^2
EST. GFR  (NON AFRICAN AMERICAN): 19 ML/MIN/1.73 M^2
EST. GFR  (NON AFRICAN AMERICAN): 21 ML/MIN/1.73 M^2
EST. GFR  (NON AFRICAN AMERICAN): 24.1 ML/MIN/1.73 M^2
EST. GFR  (NON AFRICAN AMERICAN): 25 ML/MIN/1.73 M^2
EST. GFR  (NON AFRICAN AMERICAN): 28 ML/MIN/1.73 M^2
EST. GFR  (NON AFRICAN AMERICAN): 31.2 ML/MIN/1.73 M^2
EST. GFR  (NON AFRICAN AMERICAN): 34 ML/MIN/1.73 M^2
EST. GFR  (NON AFRICAN AMERICAN): 34 ML/MIN/1.73 M^2
EST. GFR  (NON AFRICAN AMERICAN): 36 ML/MIN/1.73 M^2
EST. GFR  (NON AFRICAN AMERICAN): 36 ML/MIN/1.73 M^2
EST. GFR  (NON AFRICAN AMERICAN): 39 ML/MIN/1.73 M^2
EST. GFR  (NON AFRICAN AMERICAN): 39 ML/MIN/1.73 M^2
EST. GFR  (NON AFRICAN AMERICAN): 42 ML/MIN/1.73 M^2
EST. GFR  (NON AFRICAN AMERICAN): 42 ML/MIN/1.73 M^2
EST. GFR  (NON AFRICAN AMERICAN): 45 ML/MIN/1.73 M^2
EST. GFR  (NON AFRICAN AMERICAN): 45 ML/MIN/1.73 M^2
FRACTIONAL SHORTENING: 22 % (ref 28–44)
GLUCOSE SERPL-MCNC: 101 MG/DL (ref 70–110)
GLUCOSE SERPL-MCNC: 102 MG/DL (ref 70–110)
GLUCOSE SERPL-MCNC: 110 MG/DL (ref 70–110)
GLUCOSE SERPL-MCNC: 111 MG/DL (ref 70–110)
GLUCOSE SERPL-MCNC: 113 MG/DL (ref 70–110)
GLUCOSE SERPL-MCNC: 114 MG/DL (ref 70–110)
GLUCOSE SERPL-MCNC: 114 MG/DL (ref 70–110)
GLUCOSE SERPL-MCNC: 117 MG/DL (ref 70–110)
GLUCOSE SERPL-MCNC: 120 MG/DL (ref 70–110)
GLUCOSE SERPL-MCNC: 121 MG/DL (ref 70–110)
GLUCOSE SERPL-MCNC: 125 MG/DL (ref 70–110)
GLUCOSE SERPL-MCNC: 126 MG/DL (ref 70–110)
GLUCOSE SERPL-MCNC: 130 MG/DL (ref 70–110)
GLUCOSE SERPL-MCNC: 130 MG/DL (ref 70–110)
GLUCOSE SERPL-MCNC: 173 MG/DL (ref 70–110)
GLUCOSE SERPL-MCNC: 176 MG/DL (ref 70–110)
GLUCOSE SERPL-MCNC: 183 MG/DL (ref 70–110)
GLUCOSE SERPL-MCNC: 329 MG/DL (ref 70–110)
GLUCOSE SERPL-MCNC: 55 MG/DL (ref 70–110)
GLUCOSE SERPL-MCNC: 84 MG/DL (ref 70–110)
GLUCOSE SERPL-MCNC: 91 MG/DL (ref 70–110)
GLUCOSE SERPL-MCNC: 91 MG/DL (ref 70–110)
GLUCOSE UR QL STRIP: NEGATIVE
GLUCOSE UR QL STRIP: NORMAL
HBV CORE AB SERPL QL IA: NEGATIVE
HBV SURFACE AB SER QL IA: NEGATIVE
HBV SURFACE AB SERPL IA-ACNC: <3 MIU/ML
HBV SURFACE AG SERPL QL IA: NEGATIVE
HCO3 UR-SCNC: 17.9 MMOL/L (ref 24–28)
HCO3 UR-SCNC: 20.1 MMOL/L (ref 24–28)
HCT VFR BLD AUTO: 27.6 % (ref 40–54)
HCT VFR BLD AUTO: 27.6 % (ref 40–54)
HCT VFR BLD AUTO: 27.8 % (ref 40–54)
HCT VFR BLD AUTO: 29.4 % (ref 40–54)
HCT VFR BLD AUTO: 31.5 % (ref 40–54)
HCT VFR BLD AUTO: 35.2 % (ref 40–54)
HCT VFR BLD AUTO: 35.9 % (ref 40–54)
HCT VFR BLD AUTO: 37.1 % (ref 40–54)
HCT VFR BLD AUTO: 37.6 % (ref 40–54)
HGB BLD-MCNC: 10.1 G/DL (ref 14–18)
HGB BLD-MCNC: 11.1 G/DL (ref 14–18)
HGB BLD-MCNC: 11.5 G/DL (ref 14–18)
HGB BLD-MCNC: 11.6 G/DL (ref 14–18)
HGB BLD-MCNC: 11.8 G/DL (ref 14–18)
HGB BLD-MCNC: 8.9 G/DL (ref 14–18)
HGB BLD-MCNC: 9.5 G/DL (ref 14–18)
HGB UR QL STRIP: NEGATIVE
IMM GRANULOCYTES # BLD AUTO: 0.02 K/UL (ref 0–0.04)
IMM GRANULOCYTES # BLD AUTO: 0.15 K/UL (ref 0–0.04)
IMM GRANULOCYTES # BLD AUTO: 0.16 K/UL (ref 0–0.04)
IMM GRANULOCYTES # BLD AUTO: 0.19 K/UL (ref 0–0.04)
IMM GRANULOCYTES # BLD AUTO: 0.34 K/UL (ref 0–0.04)
IMM GRANULOCYTES # BLD AUTO: 0.58 K/UL (ref 0–0.04)
IMM GRANULOCYTES # BLD AUTO: 0.58 K/UL (ref 0–0.04)
IMM GRANULOCYTES # BLD AUTO: ABNORMAL K/UL (ref 0–0.04)
IMM GRANULOCYTES # BLD AUTO: ABNORMAL K/UL (ref 0–0.04)
IMM GRANULOCYTES NFR BLD AUTO: 0.2 % (ref 0–0.5)
IMM GRANULOCYTES NFR BLD AUTO: 0.7 % (ref 0–0.5)
IMM GRANULOCYTES NFR BLD AUTO: 0.8 % (ref 0–0.5)
IMM GRANULOCYTES NFR BLD AUTO: 0.9 % (ref 0–0.5)
IMM GRANULOCYTES NFR BLD AUTO: 1.6 % (ref 0–0.5)
IMM GRANULOCYTES NFR BLD AUTO: 2.8 % (ref 0–0.5)
IMM GRANULOCYTES NFR BLD AUTO: 2.8 % (ref 0–0.5)
IMM GRANULOCYTES NFR BLD AUTO: ABNORMAL % (ref 0–0.5)
IMM GRANULOCYTES NFR BLD AUTO: ABNORMAL % (ref 0–0.5)
INR PPP: 1.1 (ref 0.8–1.2)
INR PPP: 1.6 (ref 0.8–1.2)
INTERVENTRICULAR SEPTUM: 0.95 CM (ref 0.6–1.1)
IVRT: 57.09 MSEC
KETONES UR QL STRIP: ABNORMAL
KETONES UR QL STRIP: NORMAL
LA MAJOR: 6.3 CM
LA MINOR: 6.05 CM
LA WIDTH: 4.17 CM
LEFT ATRIUM SIZE: 4.61 CM
LEFT ATRIUM VOLUME INDEX: 52.4 ML/M2
LEFT ATRIUM VOLUME: 100.86 CM3
LEFT INTERNAL DIMENSION IN SYSTOLE: 3.21 CM (ref 2.1–4)
LEFT VENTRICLE DIASTOLIC VOLUME INDEX: 39.14 ML/M2
LEFT VENTRICLE DIASTOLIC VOLUME: 75.35 ML
LEFT VENTRICLE MASS INDEX: 69 G/M2
LEFT VENTRICLE SYSTOLIC VOLUME INDEX: 21.4 ML/M2
LEFT VENTRICLE SYSTOLIC VOLUME: 41.21 ML
LEFT VENTRICULAR INTERNAL DIMENSION IN DIASTOLE: 4.13 CM (ref 3.5–6)
LEFT VENTRICULAR MASS: 133.64 G
LEUKOCYTE ESTERASE UR QL STRIP: NEGATIVE
LEUKOCYTE ESTERASE URINE, POC: NORMAL
LIPASE SERPL-CCNC: 215 U/L (ref 23–300)
LIPASE SERPL-CCNC: 30 U/L (ref 4–60)
LV LATERAL E/E' RATIO: 8.31 M/S
LV SEPTAL E/E' RATIO: 15.43 M/S
LYMPHOCYTES # BLD AUTO: 0.6 K/UL (ref 1–4.8)
LYMPHOCYTES # BLD AUTO: 0.6 K/UL (ref 1–4.8)
LYMPHOCYTES # BLD AUTO: 0.7 K/UL (ref 1–4.8)
LYMPHOCYTES # BLD AUTO: 0.9 K/UL (ref 1–4.8)
LYMPHOCYTES # BLD AUTO: 0.9 K/UL (ref 1–4.8)
LYMPHOCYTES # BLD AUTO: 1.5 K/UL (ref 1–4.8)
LYMPHOCYTES # BLD AUTO: 1.6 K/UL (ref 1–4.8)
LYMPHOCYTES NFR BLD: 17 % (ref 18–48)
LYMPHOCYTES NFR BLD: 2.7 % (ref 18–48)
LYMPHOCYTES NFR BLD: 2.9 % (ref 18–48)
LYMPHOCYTES NFR BLD: 3.2 % (ref 18–48)
LYMPHOCYTES NFR BLD: 4 % (ref 18–48)
LYMPHOCYTES NFR BLD: 4 % (ref 18–48)
LYMPHOCYTES NFR BLD: 4.2 % (ref 18–48)
LYMPHOCYTES NFR BLD: 4.2 % (ref 18–48)
LYMPHOCYTES NFR BLD: 7.4 % (ref 18–48)
MAGNESIUM SERPL-MCNC: 2.3 MG/DL (ref 1.6–2.6)
MAGNESIUM SERPL-MCNC: 2.3 MG/DL (ref 1.6–2.6)
MAGNESIUM SERPL-MCNC: 2.4 MG/DL (ref 1.6–2.6)
MAGNESIUM SERPL-MCNC: 2.5 MG/DL (ref 1.6–2.6)
MAGNESIUM SERPL-MCNC: 2.6 MG/DL (ref 1.6–2.6)
MAGNESIUM SERPL-MCNC: 2.8 MG/DL (ref 1.6–2.6)
MAGNESIUM SERPL-MCNC: 2.8 MG/DL (ref 1.6–2.6)
MCH RBC QN AUTO: 31.1 PG (ref 27–31)
MCH RBC QN AUTO: 31.1 PG (ref 27–31)
MCH RBC QN AUTO: 31.2 PG (ref 27–31)
MCH RBC QN AUTO: 31.3 PG (ref 27–31)
MCH RBC QN AUTO: 31.3 PG (ref 27–31)
MCH RBC QN AUTO: 31.4 PG (ref 27–31)
MCH RBC QN AUTO: 31.4 PG (ref 27–31)
MCH RBC QN AUTO: 31.5 PG (ref 27–31)
MCH RBC QN AUTO: 31.6 PG (ref 27–31)
MCHC RBC AUTO-ENTMCNC: 31 G/DL (ref 32–36)
MCHC RBC AUTO-ENTMCNC: 31.4 G/DL (ref 32–36)
MCHC RBC AUTO-ENTMCNC: 31.5 G/DL (ref 32–36)
MCHC RBC AUTO-ENTMCNC: 32 G/DL (ref 32–36)
MCHC RBC AUTO-ENTMCNC: 32.1 G/DL (ref 32–36)
MCHC RBC AUTO-ENTMCNC: 32.2 G/DL (ref 32–36)
MCHC RBC AUTO-ENTMCNC: 32.2 G/DL (ref 32–36)
MCHC RBC AUTO-ENTMCNC: 32.3 G/DL (ref 32–36)
MCHC RBC AUTO-ENTMCNC: 32.3 G/DL (ref 32–36)
MCV RBC AUTO: 100 FL (ref 82–98)
MCV RBC AUTO: 101 FL (ref 82–98)
MCV RBC AUTO: 97 FL (ref 82–98)
MCV RBC AUTO: 98 FL (ref 82–98)
MCV RBC AUTO: 99 FL (ref 82–98)
MONOCYTES # BLD AUTO: 0.7 K/UL (ref 0.3–1)
MONOCYTES # BLD AUTO: 1.2 K/UL (ref 0.3–1)
MONOCYTES # BLD AUTO: 1.6 K/UL (ref 0.3–1)
MONOCYTES # BLD AUTO: 1.8 K/UL (ref 0.3–1)
MONOCYTES # BLD AUTO: 1.9 K/UL (ref 0.3–1)
MONOCYTES NFR BLD: 1 % (ref 4–15)
MONOCYTES NFR BLD: 3 % (ref 4–15)
MONOCYTES NFR BLD: 5.6 % (ref 4–15)
MONOCYTES NFR BLD: 7.3 % (ref 4–15)
MONOCYTES NFR BLD: 7.3 % (ref 4–15)
MONOCYTES NFR BLD: 8.6 % (ref 4–15)
MONOCYTES NFR BLD: 8.9 % (ref 4–15)
MONOCYTES NFR BLD: 9.3 % (ref 4–15)
MONOCYTES NFR BLD: 9.3 % (ref 4–15)
MV PEAK A VEL: 0.29 M/S
MV PEAK E VEL: 1.08 M/S
NEUTROPHILS # BLD AUTO: 17 K/UL (ref 1.8–7.7)
NEUTROPHILS # BLD AUTO: 17.4 K/UL (ref 1.8–7.7)
NEUTROPHILS # BLD AUTO: 17.4 K/UL (ref 1.8–7.7)
NEUTROPHILS # BLD AUTO: 18.2 K/UL (ref 1.8–7.7)
NEUTROPHILS # BLD AUTO: 18.9 K/UL (ref 1.8–7.7)
NEUTROPHILS # BLD AUTO: 19.5 K/UL (ref 1.8–7.7)
NEUTROPHILS # BLD AUTO: 6.9 K/UL (ref 1.8–7.7)
NEUTROPHILS NFR BLD: 74 % (ref 38–73)
NEUTROPHILS NFR BLD: 83.2 % (ref 38–73)
NEUTROPHILS NFR BLD: 83.6 % (ref 38–73)
NEUTROPHILS NFR BLD: 83.6 % (ref 38–73)
NEUTROPHILS NFR BLD: 86.5 % (ref 38–73)
NEUTROPHILS NFR BLD: 88 % (ref 38–73)
NEUTROPHILS NFR BLD: 88.9 % (ref 38–73)
NEUTROPHILS NFR BLD: 89.5 % (ref 38–73)
NEUTROPHILS NFR BLD: 91 % (ref 38–73)
NEUTS BAND NFR BLD MANUAL: 4 %
NEUTS BAND NFR BLD MANUAL: 5 %
NITRITE UR QL STRIP: NEGATIVE
NITRITE, POC UA: NORMAL
NRBC BLD-RTO: 0 /100 WBC
NRBC BLD-RTO: 1 /100 WBC
NRBC BLD-RTO: 1 /100 WBC
NRBC BLD-RTO: 2 /100 WBC
NRBC BLD-RTO: 2 /100 WBC
OVALOCYTES BLD QL SMEAR: ABNORMAL
PCO2 BLDA: 37.4 MMHG (ref 35–45)
PCO2 BLDA: 42.5 MMHG (ref 35–45)
PH SMN: 7.28 [PH] (ref 7.35–7.45)
PH SMN: 7.29 [PH] (ref 7.35–7.45)
PH UR STRIP: 5 [PH] (ref 5–8)
PH, POC UA: 6
PHOSPHATE SERPL-MCNC: 3.5 MG/DL (ref 2.7–4.5)
PHOSPHATE SERPL-MCNC: 3.6 MG/DL (ref 2.7–4.5)
PHOSPHATE SERPL-MCNC: 3.6 MG/DL (ref 2.7–4.5)
PHOSPHATE SERPL-MCNC: 3.8 MG/DL (ref 2.7–4.5)
PHOSPHATE SERPL-MCNC: 3.8 MG/DL (ref 2.7–4.5)
PHOSPHATE SERPL-MCNC: 4 MG/DL (ref 2.7–4.5)
PHOSPHATE SERPL-MCNC: 4.2 MG/DL (ref 2.7–4.5)
PHOSPHATE SERPL-MCNC: 6.4 MG/DL (ref 2.7–4.5)
PISA TR MAX VEL: 3.06 M/S
PLATELET # BLD AUTO: 117 K/UL (ref 150–350)
PLATELET # BLD AUTO: 129 K/UL (ref 150–350)
PLATELET # BLD AUTO: 131 K/UL (ref 150–350)
PLATELET # BLD AUTO: 131 K/UL (ref 150–350)
PLATELET # BLD AUTO: 132 K/UL (ref 150–350)
PLATELET # BLD AUTO: 139 K/UL (ref 150–350)
PLATELET # BLD AUTO: 151 K/UL (ref 150–350)
PLATELET # BLD AUTO: 154 K/UL (ref 150–350)
PLATELET # BLD AUTO: 162 K/UL (ref 150–350)
PLATELET BLD QL SMEAR: ABNORMAL
PLATELET BLD QL SMEAR: ABNORMAL
PMV BLD AUTO: 10.7 FL (ref 9.2–12.9)
PMV BLD AUTO: 10.8 FL (ref 9.2–12.9)
PMV BLD AUTO: 10.9 FL (ref 9.2–12.9)
PMV BLD AUTO: 10.9 FL (ref 9.2–12.9)
PMV BLD AUTO: 11.3 FL (ref 9.2–12.9)
PMV BLD AUTO: 11.4 FL (ref 9.2–12.9)
PMV BLD AUTO: 11.4 FL (ref 9.2–12.9)
PMV BLD AUTO: 11.6 FL (ref 9.2–12.9)
PMV BLD AUTO: 11.8 FL (ref 9.2–12.9)
PO2 BLDA: 24 MMHG (ref 40–60)
PO2 BLDA: 27 MMHG (ref 40–60)
POC BE: -7 MMOL/L
POC BE: -9 MMOL/L
POC SATURATED O2: 36 % (ref 95–100)
POC SATURATED O2: 44 % (ref 95–100)
POC TCO2: 19 MMOL/L (ref 24–29)
POC TCO2: 21 MMOL/L (ref 24–29)
POCT GLUCOSE: 126 MG/DL (ref 70–110)
POCT GLUCOSE: 140 MG/DL (ref 70–110)
POCT GLUCOSE: 141 MG/DL (ref 70–110)
POCT GLUCOSE: 156 MG/DL (ref 70–110)
POCT GLUCOSE: 161 MG/DL (ref 70–110)
POCT GLUCOSE: 164 MG/DL (ref 70–110)
POCT GLUCOSE: 65 MG/DL (ref 70–110)
POIKILOCYTOSIS BLD QL SMEAR: SLIGHT
POLYCHROMASIA BLD QL SMEAR: ABNORMAL
POTASSIUM SERPL-SCNC: 4.1 MMOL/L (ref 3.5–5.1)
POTASSIUM SERPL-SCNC: 4.2 MMOL/L (ref 3.5–5.1)
POTASSIUM SERPL-SCNC: 4.5 MMOL/L (ref 3.5–5.1)
POTASSIUM SERPL-SCNC: 4.6 MMOL/L (ref 3.5–5.1)
POTASSIUM SERPL-SCNC: 4.6 MMOL/L (ref 3.5–5.1)
POTASSIUM SERPL-SCNC: 4.7 MMOL/L (ref 3.5–5.1)
POTASSIUM SERPL-SCNC: 4.8 MMOL/L (ref 3.5–5.1)
POTASSIUM SERPL-SCNC: 4.9 MMOL/L (ref 3.5–5.1)
POTASSIUM SERPL-SCNC: 5 MMOL/L (ref 3.5–5.1)
POTASSIUM SERPL-SCNC: 5.2 MMOL/L (ref 3.5–5.1)
POTASSIUM SERPL-SCNC: 5.2 MMOL/L (ref 3.5–5.1)
POTASSIUM SERPL-SCNC: 5.4 MMOL/L (ref 3.5–5.1)
POTASSIUM SERPL-SCNC: 5.6 MMOL/L (ref 3.5–5.1)
POTASSIUM SERPL-SCNC: 5.6 MMOL/L (ref 3.5–5.1)
POTASSIUM SERPL-SCNC: 5.8 MMOL/L (ref 3.5–5.1)
POTASSIUM SERPL-SCNC: 5.9 MMOL/L (ref 3.5–5.1)
PROT SERPL-MCNC: 5.9 G/DL (ref 6–8.4)
PROT SERPL-MCNC: 6 G/DL (ref 6–8.4)
PROT SERPL-MCNC: 6.4 G/DL (ref 6–8.4)
PROT SERPL-MCNC: 6.6 G/DL (ref 6–8.4)
PROT SERPL-MCNC: 6.7 G/DL (ref 6–8.4)
PROT SERPL-MCNC: 7.4 G/DL (ref 6–8.4)
PROT UR QL STRIP: ABNORMAL
PROTEIN, POC: NORMAL
PROTHROMBIN TIME: 12 SEC (ref 9–12.5)
PROTHROMBIN TIME: 18.3 SEC (ref 9–12.5)
PV PEAK VELOCITY: 1.04 CM/S
RA MAJOR: 7.25 CM
RA WIDTH: 4.71 CM
RBC # BLD AUTO: 2.84 M/UL (ref 4.6–6.2)
RBC # BLD AUTO: 2.86 M/UL (ref 4.6–6.2)
RBC # BLD AUTO: 2.86 M/UL (ref 4.6–6.2)
RBC # BLD AUTO: 3.03 M/UL (ref 4.6–6.2)
RBC # BLD AUTO: 3.21 M/UL (ref 4.6–6.2)
RBC # BLD AUTO: 3.54 M/UL (ref 4.6–6.2)
RBC # BLD AUTO: 3.67 M/UL (ref 4.6–6.2)
RBC # BLD AUTO: 3.67 M/UL (ref 4.6–6.2)
RBC # BLD AUTO: 3.78 M/UL (ref 4.6–6.2)
RIGHT VENTRICULAR END-DIASTOLIC DIMENSION: 2.59 CM
SAMPLE: ABNORMAL
SAMPLE: ABNORMAL
SARS-COV-2 RDRP RESP QL NAA+PROBE: NEGATIVE
SCHISTOCYTES BLD QL SMEAR: PRESENT
SITE: ABNORMAL
SITE: ABNORMAL
SODIUM SERPL-SCNC: 133 MMOL/L (ref 136–145)
SODIUM SERPL-SCNC: 134 MMOL/L (ref 136–145)
SODIUM SERPL-SCNC: 135 MMOL/L (ref 136–145)
SODIUM SERPL-SCNC: 136 MMOL/L (ref 136–145)
SODIUM SERPL-SCNC: 137 MMOL/L (ref 136–145)
SODIUM SERPL-SCNC: 138 MMOL/L (ref 136–145)
SODIUM SERPL-SCNC: 140 MMOL/L (ref 136–145)
SODIUM SERPL-SCNC: 140 MMOL/L (ref 136–145)
SODIUM UR-SCNC: <20 MMOL/L (ref 20–250)
SP GR UR STRIP: 1.02 (ref 1–1.03)
SPECIFIC GRAVITY, POC UA: 1.02
TDI LATERAL: 0.13 M/S
TDI SEPTAL: 0.07 M/S
TDI: 0.1 M/S
TR MAX PG: 37 MMHG
TROPONIN I SERPL DL<=0.01 NG/ML-MCNC: 0.09 NG/ML (ref 0–0.03)
TROPONIN I SERPL DL<=0.01 NG/ML-MCNC: 0.09 NG/ML (ref 0–0.03)
TROPONIN I SERPL DL<=0.01 NG/ML-MCNC: 0.12 NG/ML (ref 0–0.03)
TSH SERPL DL<=0.005 MIU/L-ACNC: 1.13 UIU/ML (ref 0.4–4)
URATE SERPL-MCNC: 4.6 MG/DL (ref 3.4–7)
URN SPEC COLLECT METH UR: ABNORMAL
UROBILINOGEN UR STRIP-ACNC: 1 EU/DL
UROBILINOGEN, POC UA: 0.2
VANCOMYCIN SERPL-MCNC: 14.6 UG/ML
VANCOMYCIN SERPL-MCNC: 17.9 UG/ML
VANCOMYCIN SERPL-MCNC: 19.4 UG/ML
VANCOMYCIN SERPL-MCNC: 25.2 UG/ML
WBC # BLD AUTO: 20.47 K/UL (ref 3.9–12.7)
WBC # BLD AUTO: 20.77 K/UL (ref 3.9–12.7)
WBC # BLD AUTO: 20.77 K/UL (ref 3.9–12.7)
WBC # BLD AUTO: 20.78 K/UL (ref 3.9–12.7)
WBC # BLD AUTO: 21.01 K/UL (ref 3.9–12.7)
WBC # BLD AUTO: 21.1 K/UL (ref 3.9–12.7)
WBC # BLD AUTO: 21.95 K/UL (ref 3.9–12.7)
WBC # BLD AUTO: 22.9 K/UL (ref 3.9–12.7)
WBC # BLD AUTO: 9.29 K/UL (ref 3.9–12.7)

## 2020-01-01 PROCEDURE — 99285 EMERGENCY DEPT VISIT HI MDM: CPT | Mod: 25,ER

## 2020-01-01 PROCEDURE — 84132 ASSAY OF SERUM POTASSIUM: CPT

## 2020-01-01 PROCEDURE — 96360 HYDRATION IV INFUSION INIT: CPT | Mod: ER

## 2020-01-01 PROCEDURE — 94799 UNLISTED PULMONARY SVC/PX: CPT

## 2020-01-01 PROCEDURE — 12002 RPR S/N/AX/GEN/TRNK2.6-7.5CM: CPT | Mod: ER

## 2020-01-01 PROCEDURE — 94640 AIRWAY INHALATION TREATMENT: CPT

## 2020-01-01 PROCEDURE — 25000003 PHARM REV CODE 250: Performed by: INTERNAL MEDICINE

## 2020-01-01 PROCEDURE — 92526 ORAL FUNCTION THERAPY: CPT

## 2020-01-01 PROCEDURE — 80048 BASIC METABOLIC PNL TOTAL CA: CPT | Mod: 91

## 2020-01-01 PROCEDURE — 83690 ASSAY OF LIPASE: CPT | Mod: ER

## 2020-01-01 PROCEDURE — 3074F SYST BP LT 130 MM HG: CPT | Mod: CPTII,S$GLB,, | Performed by: INTERNAL MEDICINE

## 2020-01-01 PROCEDURE — 25000003 PHARM REV CODE 250: Performed by: FAMILY MEDICINE

## 2020-01-01 PROCEDURE — 96375 TX/PRO/DX INJ NEW DRUG ADDON: CPT | Performed by: EMERGENCY MEDICINE

## 2020-01-01 PROCEDURE — 81002 URINALYSIS NONAUTO W/O SCOPE: CPT | Mod: S$GLB,,, | Performed by: INTERNAL MEDICINE

## 2020-01-01 PROCEDURE — 63600175 PHARM REV CODE 636 W HCPCS: Performed by: FAMILY MEDICINE

## 2020-01-01 PROCEDURE — 94761 N-INVAS EAR/PLS OXIMETRY MLT: CPT

## 2020-01-01 PROCEDURE — 93005 ELECTROCARDIOGRAM TRACING: CPT

## 2020-01-01 PROCEDURE — 96365 THER/PROPH/DIAG IV INF INIT: CPT | Mod: ER

## 2020-01-01 PROCEDURE — 99232 PR SUBSEQUENT HOSPITAL CARE,LEVL II: ICD-10-PCS | Mod: ,,, | Performed by: INTERNAL MEDICINE

## 2020-01-01 PROCEDURE — 83690 ASSAY OF LIPASE: CPT

## 2020-01-01 PROCEDURE — 25000003 PHARM REV CODE 250: Performed by: STUDENT IN AN ORGANIZED HEALTH CARE EDUCATION/TRAINING PROGRAM

## 2020-01-01 PROCEDURE — 1125F AMNT PAIN NOTED PAIN PRSNT: CPT | Mod: S$GLB,,, | Performed by: INTERNAL MEDICINE

## 2020-01-01 PROCEDURE — 27202415 HC CARTRIDGE, CRRT

## 2020-01-01 PROCEDURE — 85025 COMPLETE CBC W/AUTO DIFF WBC: CPT

## 2020-01-01 PROCEDURE — 25000003 PHARM REV CODE 250: Performed by: NURSE PRACTITIONER

## 2020-01-01 PROCEDURE — 27000221 HC OXYGEN, UP TO 24 HOURS

## 2020-01-01 PROCEDURE — 80069 RENAL FUNCTION PANEL: CPT

## 2020-01-01 PROCEDURE — 43262 PR ERCP,SPHINCTEROTOMY: ICD-10-PCS | Mod: 51,,, | Performed by: INTERNAL MEDICINE

## 2020-01-01 PROCEDURE — 63600175 PHARM REV CODE 636 W HCPCS: Performed by: HOSPITALIST

## 2020-01-01 PROCEDURE — 93005 ELECTROCARDIOGRAM TRACING: CPT | Mod: ER

## 2020-01-01 PROCEDURE — 3078F DIAST BP <80 MM HG: CPT | Mod: CPTII,S$GLB,, | Performed by: STUDENT IN AN ORGANIZED HEALTH CARE EDUCATION/TRAINING PROGRAM

## 2020-01-01 PROCEDURE — 36600 WITHDRAWAL OF ARTERIAL BLOOD: CPT

## 2020-01-01 PROCEDURE — 25000003 PHARM REV CODE 250: Performed by: SURGERY

## 2020-01-01 PROCEDURE — 93010 ELECTROCARDIOGRAM REPORT: CPT | Mod: ,,, | Performed by: INTERNAL MEDICINE

## 2020-01-01 PROCEDURE — 93010 EKG 12-LEAD: ICD-10-PCS | Mod: ,,, | Performed by: INTERNAL MEDICINE

## 2020-01-01 PROCEDURE — 85007 BL SMEAR W/DIFF WBC COUNT: CPT | Mod: NCS

## 2020-01-01 PROCEDURE — 82040 ASSAY OF SERUM ALBUMIN: CPT

## 2020-01-01 PROCEDURE — 25000242 PHARM REV CODE 250 ALT 637 W/ HCPCS: Performed by: NURSE PRACTITIONER

## 2020-01-01 PROCEDURE — 83880 ASSAY OF NATRIURETIC PEPTIDE: CPT

## 2020-01-01 PROCEDURE — 27201674 HC SPHINCTERTOME: Performed by: INTERNAL MEDICINE

## 2020-01-01 PROCEDURE — 84300 ASSAY OF URINE SODIUM: CPT

## 2020-01-01 PROCEDURE — 63600175 PHARM REV CODE 636 W HCPCS: Performed by: CLINIC/CENTER

## 2020-01-01 PROCEDURE — 87077 CULTURE AEROBIC IDENTIFY: CPT | Mod: ER

## 2020-01-01 PROCEDURE — 86706 HEP B SURFACE ANTIBODY: CPT

## 2020-01-01 PROCEDURE — 20000000 HC ICU ROOM

## 2020-01-01 PROCEDURE — 3074F SYST BP LT 130 MM HG: CPT | Mod: CPTII,S$GLB,, | Performed by: STUDENT IN AN ORGANIZED HEALTH CARE EDUCATION/TRAINING PROGRAM

## 2020-01-01 PROCEDURE — 80202 ASSAY OF VANCOMYCIN: CPT

## 2020-01-01 PROCEDURE — 74176 CT ABD & PELVIS W/O CONTRAST: CPT | Mod: TC,PO

## 2020-01-01 PROCEDURE — 51702 INSERT TEMP BLADDER CATH: CPT

## 2020-01-01 PROCEDURE — 1159F MED LIST DOCD IN RCRD: CPT | Mod: S$GLB,,, | Performed by: INTERNAL MEDICINE

## 2020-01-01 PROCEDURE — 99999 PR PBB SHADOW E&M-EST. PATIENT-LVL IV: CPT | Mod: PBBFAC,,, | Performed by: INTERNAL MEDICINE

## 2020-01-01 PROCEDURE — 1101F PT FALLS ASSESS-DOCD LE1/YR: CPT | Mod: CPTII,S$GLB,, | Performed by: INTERNAL MEDICINE

## 2020-01-01 PROCEDURE — 86704 HEP B CORE ANTIBODY TOTAL: CPT

## 2020-01-01 PROCEDURE — 99223 PR INITIAL HOSPITAL CARE,LEVL III: ICD-10-PCS | Mod: ,,, | Performed by: INTERNAL MEDICINE

## 2020-01-01 PROCEDURE — U0002 COVID-19 LAB TEST NON-CDC: HCPCS | Mod: ER

## 2020-01-01 PROCEDURE — 1126F PR PAIN SEVERITY QUANTIFIED, NO PAIN PRESENT: ICD-10-PCS | Mod: S$GLB,,, | Performed by: STUDENT IN AN ORGANIZED HEALTH CARE EDUCATION/TRAINING PROGRAM

## 2020-01-01 PROCEDURE — 3074F PR MOST RECENT SYSTOLIC BLOOD PRESSURE < 130 MM HG: ICD-10-PCS | Mod: CPTII,S$GLB,, | Performed by: STUDENT IN AN ORGANIZED HEALTH CARE EDUCATION/TRAINING PROGRAM

## 2020-01-01 PROCEDURE — 99284 EMERGENCY DEPT VISIT MOD MDM: CPT | Mod: 25,ER

## 2020-01-01 PROCEDURE — 80053 COMPREHEN METABOLIC PANEL: CPT

## 2020-01-01 PROCEDURE — 43264 ERCP REMOVE DUCT CALCULI: CPT | Mod: ,,, | Performed by: INTERNAL MEDICINE

## 2020-01-01 PROCEDURE — 90945 DIALYSIS ONE EVALUATION: CPT

## 2020-01-01 PROCEDURE — 37000008 HC ANESTHESIA 1ST 15 MINUTES: Performed by: INTERNAL MEDICINE

## 2020-01-01 PROCEDURE — 43262 ENDO CHOLANGIOPANCREATOGRAPH: CPT | Mod: 51,,, | Performed by: INTERNAL MEDICINE

## 2020-01-01 PROCEDURE — 3078F PR MOST RECENT DIASTOLIC BLOOD PRESSURE < 80 MM HG: ICD-10-PCS | Mod: CPTII,S$GLB,, | Performed by: STUDENT IN AN ORGANIZED HEALTH CARE EDUCATION/TRAINING PROGRAM

## 2020-01-01 PROCEDURE — 99215 PR OFFICE/OUTPT VISIT, EST, LEVL V, 40-54 MIN: ICD-10-PCS | Mod: 25,S$GLB,, | Performed by: INTERNAL MEDICINE

## 2020-01-01 PROCEDURE — 87040 BLOOD CULTURE FOR BACTERIA: CPT | Mod: 59,ER

## 2020-01-01 PROCEDURE — 63600175 PHARM REV CODE 636 W HCPCS: Performed by: NURSE PRACTITIONER

## 2020-01-01 PROCEDURE — 85027 COMPLETE CBC AUTOMATED: CPT

## 2020-01-01 PROCEDURE — 1101F PR PT FALLS ASSESS DOC 0-1 FALLS W/OUT INJ PAST YR: ICD-10-PCS | Mod: CPTII,S$GLB,, | Performed by: INTERNAL MEDICINE

## 2020-01-01 PROCEDURE — 87040 BLOOD CULTURE FOR BACTERIA: CPT

## 2020-01-01 PROCEDURE — 85025 COMPLETE CBC W/AUTO DIFF WBC: CPT | Mod: 91

## 2020-01-01 PROCEDURE — 85610 PROTHROMBIN TIME: CPT | Mod: ER

## 2020-01-01 PROCEDURE — 80048 BASIC METABOLIC PNL TOTAL CA: CPT

## 2020-01-01 PROCEDURE — 90715 TDAP VACCINE 7 YRS/> IM: CPT | Mod: ER | Performed by: EMERGENCY MEDICINE

## 2020-01-01 PROCEDURE — S5010 5% DEXTROSE AND 0.45% SALINE: HCPCS | Performed by: SURGERY

## 2020-01-01 PROCEDURE — 99223 1ST HOSP IP/OBS HIGH 75: CPT | Mod: ,,, | Performed by: INTERNAL MEDICINE

## 2020-01-01 PROCEDURE — 82803 BLOOD GASES ANY COMBINATION: CPT

## 2020-01-01 PROCEDURE — G0378 HOSPITAL OBSERVATION PER HR: HCPCS

## 2020-01-01 PROCEDURE — 1125F PR PAIN SEVERITY QUANTIFIED, PAIN PRESENT: ICD-10-PCS | Mod: S$GLB,,, | Performed by: INTERNAL MEDICINE

## 2020-01-01 PROCEDURE — 96374 THER/PROPH/DIAG INJ IV PUSH: CPT | Mod: 59 | Performed by: EMERGENCY MEDICINE

## 2020-01-01 PROCEDURE — 80100008 HC CRRT DAILY MAINTENANCE

## 2020-01-01 PROCEDURE — 25000242 PHARM REV CODE 250 ALT 637 W/ HCPCS: Performed by: ANESTHESIOLOGY

## 2020-01-01 PROCEDURE — C1769 GUIDE WIRE: HCPCS | Performed by: INTERNAL MEDICINE

## 2020-01-01 PROCEDURE — 43264 ERCP REMOVE DUCT CALCULI: CPT | Performed by: INTERNAL MEDICINE

## 2020-01-01 PROCEDURE — 63600175 PHARM REV CODE 636 W HCPCS: Mod: JG | Performed by: INTERNAL MEDICINE

## 2020-01-01 PROCEDURE — 43262 ENDO CHOLANGIOPANCREATOGRAPH: CPT | Performed by: INTERNAL MEDICINE

## 2020-01-01 PROCEDURE — 11000001 HC ACUTE MED/SURG PRIVATE ROOM

## 2020-01-01 PROCEDURE — 84484 ASSAY OF TROPONIN QUANT: CPT | Mod: 91

## 2020-01-01 PROCEDURE — 99232 SBSQ HOSP IP/OBS MODERATE 35: CPT | Mod: ,,, | Performed by: INTERNAL MEDICINE

## 2020-01-01 PROCEDURE — 63600175 PHARM REV CODE 636 W HCPCS: Performed by: INTERNAL MEDICINE

## 2020-01-01 PROCEDURE — 87340 HEPATITIS B SURFACE AG IA: CPT

## 2020-01-01 PROCEDURE — 74328 PR  X-RAY FOR BILE DUCT ENDOSCOPY: ICD-10-PCS | Mod: 26,,, | Performed by: INTERNAL MEDICINE

## 2020-01-01 PROCEDURE — 84443 ASSAY THYROID STIM HORMONE: CPT | Mod: ER

## 2020-01-01 PROCEDURE — 99999 PR PBB SHADOW E&M-EST. PATIENT-LVL III: ICD-10-PCS | Mod: PBBFAC,,, | Performed by: STUDENT IN AN ORGANIZED HEALTH CARE EDUCATION/TRAINING PROGRAM

## 2020-01-01 PROCEDURE — 99900035 HC TECH TIME PER 15 MIN (STAT)

## 2020-01-01 PROCEDURE — 3078F DIAST BP <80 MM HG: CPT | Mod: CPTII,S$GLB,, | Performed by: INTERNAL MEDICINE

## 2020-01-01 PROCEDURE — 80053 COMPREHEN METABOLIC PANEL: CPT | Mod: ER

## 2020-01-01 PROCEDURE — 80069 RENAL FUNCTION PANEL: CPT | Mod: 91

## 2020-01-01 PROCEDURE — 25000003 PHARM REV CODE 250: Performed by: HOSPITALIST

## 2020-01-01 PROCEDURE — 94760 N-INVAS EAR/PLS OXIMETRY 1: CPT | Mod: ER

## 2020-01-01 PROCEDURE — 96361 HYDRATE IV INFUSION ADD-ON: CPT | Mod: 59 | Performed by: EMERGENCY MEDICINE

## 2020-01-01 PROCEDURE — 63600175 PHARM REV CODE 636 W HCPCS: Performed by: SURGERY

## 2020-01-01 PROCEDURE — 92610 EVALUATE SWALLOWING FUNCTION: CPT

## 2020-01-01 PROCEDURE — 90471 IMMUNIZATION ADMIN: CPT | Mod: ER | Performed by: EMERGENCY MEDICINE

## 2020-01-01 PROCEDURE — 81002 POCT URINE DIPSTICK WITHOUT MICROSCOPE: ICD-10-PCS | Mod: S$GLB,,, | Performed by: INTERNAL MEDICINE

## 2020-01-01 PROCEDURE — 83735 ASSAY OF MAGNESIUM: CPT | Mod: 91

## 2020-01-01 PROCEDURE — 82248 BILIRUBIN DIRECT: CPT

## 2020-01-01 PROCEDURE — 37000009 HC ANESTHESIA EA ADD 15 MINS: Performed by: INTERNAL MEDICINE

## 2020-01-01 PROCEDURE — 1126F AMNT PAIN NOTED NONE PRSNT: CPT | Mod: S$GLB,,, | Performed by: STUDENT IN AN ORGANIZED HEALTH CARE EDUCATION/TRAINING PROGRAM

## 2020-01-01 PROCEDURE — 93296 REM INTERROG EVL PM/IDS: CPT | Mod: S$GLB,,, | Performed by: INTERNAL MEDICINE

## 2020-01-01 PROCEDURE — 36415 COLL VENOUS BLD VENIPUNCTURE: CPT

## 2020-01-01 PROCEDURE — 96376 TX/PRO/DX INJ SAME DRUG ADON: CPT | Mod: ER

## 2020-01-01 PROCEDURE — 99999 PR PBB SHADOW E&M-EST. PATIENT-LVL III: CPT | Mod: PBBFAC,,, | Performed by: STUDENT IN AN ORGANIZED HEALTH CARE EDUCATION/TRAINING PROGRAM

## 2020-01-01 PROCEDURE — 83735 ASSAY OF MAGNESIUM: CPT

## 2020-01-01 PROCEDURE — 96375 TX/PRO/DX INJ NEW DRUG ADDON: CPT | Mod: ER

## 2020-01-01 PROCEDURE — 99999 PR PBB SHADOW E&M-EST. PATIENT-LVL IV: ICD-10-PCS | Mod: PBBFAC,,, | Performed by: INTERNAL MEDICINE

## 2020-01-01 PROCEDURE — 84550 ASSAY OF BLOOD/URIC ACID: CPT

## 2020-01-01 PROCEDURE — 43264 PR ERCP,W/REMOVAL STONE,BIL/PANCR DUCTS: ICD-10-PCS | Mod: ,,, | Performed by: INTERNAL MEDICINE

## 2020-01-01 PROCEDURE — 99233 PR SUBSEQUENT HOSPITAL CARE,LEVL III: ICD-10-PCS | Mod: ,,, | Performed by: NURSE PRACTITIONER

## 2020-01-01 PROCEDURE — 25500020 PHARM REV CODE 255: Performed by: INTERNAL MEDICINE

## 2020-01-01 PROCEDURE — 1159F PR MEDICATION LIST DOCUMENTED IN MEDICAL RECORD: ICD-10-PCS | Mod: S$GLB,,, | Performed by: STUDENT IN AN ORGANIZED HEALTH CARE EDUCATION/TRAINING PROGRAM

## 2020-01-01 PROCEDURE — 25000242 PHARM REV CODE 250 ALT 637 W/ HCPCS: Performed by: INTERNAL MEDICINE

## 2020-01-01 PROCEDURE — 63600175 PHARM REV CODE 636 W HCPCS: Performed by: STUDENT IN AN ORGANIZED HEALTH CARE EDUCATION/TRAINING PROGRAM

## 2020-01-01 PROCEDURE — 85007 BL SMEAR W/DIFF WBC COUNT: CPT

## 2020-01-01 PROCEDURE — 85025 COMPLETE CBC W/AUTO DIFF WBC: CPT | Mod: ER

## 2020-01-01 PROCEDURE — 84484 ASSAY OF TROPONIN QUANT: CPT

## 2020-01-01 PROCEDURE — C9113 INJ PANTOPRAZOLE SODIUM, VIA: HCPCS | Performed by: HOSPITALIST

## 2020-01-01 PROCEDURE — 84100 ASSAY OF PHOSPHORUS: CPT

## 2020-01-01 PROCEDURE — 99214 PR OFFICE/OUTPT VISIT, EST, LEVL IV, 30-39 MIN: ICD-10-PCS | Mod: S$GLB,,, | Performed by: STUDENT IN AN ORGANIZED HEALTH CARE EDUCATION/TRAINING PROGRAM

## 2020-01-01 PROCEDURE — 63600175 PHARM REV CODE 636 W HCPCS: Mod: ER | Performed by: EMERGENCY MEDICINE

## 2020-01-01 PROCEDURE — 63600175 PHARM REV CODE 636 W HCPCS: Performed by: ANESTHESIOLOGY

## 2020-01-01 PROCEDURE — 3078F PR MOST RECENT DIASTOLIC BLOOD PRESSURE < 80 MM HG: ICD-10-PCS | Mod: CPTII,S$GLB,, | Performed by: INTERNAL MEDICINE

## 2020-01-01 PROCEDURE — 96376 TX/PRO/DX INJ SAME DRUG ADON: CPT | Mod: 59 | Performed by: EMERGENCY MEDICINE

## 2020-01-01 PROCEDURE — 99231 PR SUBSEQUENT HOSPITAL CARE,LEVL I: ICD-10-PCS | Mod: ,,, | Performed by: INTERNAL MEDICINE

## 2020-01-01 PROCEDURE — 87186 SC STD MICRODIL/AGAR DIL: CPT | Mod: ER

## 2020-01-01 PROCEDURE — 99215 OFFICE O/P EST HI 40 MIN: CPT | Mod: 25,S$GLB,, | Performed by: INTERNAL MEDICINE

## 2020-01-01 PROCEDURE — 1159F PR MEDICATION LIST DOCUMENTED IN MEDICAL RECORD: ICD-10-PCS | Mod: S$GLB,,, | Performed by: INTERNAL MEDICINE

## 2020-01-01 PROCEDURE — S5010 5% DEXTROSE AND 0.45% SALINE: HCPCS | Performed by: INTERNAL MEDICINE

## 2020-01-01 PROCEDURE — 99214 OFFICE O/P EST MOD 30 MIN: CPT | Mod: S$GLB,,, | Performed by: STUDENT IN AN ORGANIZED HEALTH CARE EDUCATION/TRAINING PROGRAM

## 2020-01-01 PROCEDURE — 93296 PR INTERROG EVAL, REMOTE, UP TO 90 DAYS, PACER/DEFIB,TECH REVIEW: ICD-10-PCS | Mod: S$GLB,,, | Performed by: INTERNAL MEDICINE

## 2020-01-01 PROCEDURE — 85610 PROTHROMBIN TIME: CPT

## 2020-01-01 PROCEDURE — 36556 INSERT NON-TUNNEL CV CATH: CPT

## 2020-01-01 PROCEDURE — P9047 ALBUMIN (HUMAN), 25%, 50ML: HCPCS | Mod: JG | Performed by: INTERNAL MEDICINE

## 2020-01-01 PROCEDURE — 99233 SBSQ HOSP IP/OBS HIGH 50: CPT | Mod: ,,, | Performed by: NURSE PRACTITIONER

## 2020-01-01 PROCEDURE — 1159F MED LIST DOCD IN RCRD: CPT | Mod: S$GLB,,, | Performed by: STUDENT IN AN ORGANIZED HEALTH CARE EDUCATION/TRAINING PROGRAM

## 2020-01-01 PROCEDURE — 3074F PR MOST RECENT SYSTOLIC BLOOD PRESSURE < 130 MM HG: ICD-10-PCS | Mod: CPTII,S$GLB,, | Performed by: INTERNAL MEDICINE

## 2020-01-01 PROCEDURE — 25000003 PHARM REV CODE 250: Mod: ER | Performed by: EMERGENCY MEDICINE

## 2020-01-01 PROCEDURE — 27202125 HC BALLOON, EXTRACTION (ANY): Performed by: INTERNAL MEDICINE

## 2020-01-01 PROCEDURE — 25000003 PHARM REV CODE 250: Performed by: CLINIC/CENTER

## 2020-01-01 PROCEDURE — 74328 X-RAY BILE DUCT ENDOSCOPY: CPT | Mod: 26,,, | Performed by: INTERNAL MEDICINE

## 2020-01-01 PROCEDURE — 81003 URINALYSIS AUTO W/O SCOPE: CPT

## 2020-01-01 PROCEDURE — 99231 SBSQ HOSP IP/OBS SF/LOW 25: CPT | Mod: ,,, | Performed by: INTERNAL MEDICINE

## 2020-01-01 RX ORDER — PHENYLEPHRINE HYDROCHLORIDE 10 MG/ML
INJECTION INTRAVENOUS
Status: DISCONTINUED | OUTPATIENT
Start: 2020-01-01 | End: 2020-01-01

## 2020-01-01 RX ORDER — QUETIAPINE FUMARATE 25 MG/1
50 TABLET, FILM COATED ORAL NIGHTLY
Status: DISCONTINUED | OUTPATIENT
Start: 2020-01-01 | End: 2020-01-01 | Stop reason: HOSPADM

## 2020-01-01 RX ORDER — PANTOPRAZOLE SODIUM 40 MG/10ML
40 INJECTION, POWDER, LYOPHILIZED, FOR SOLUTION INTRAVENOUS 2 TIMES DAILY
Status: DISCONTINUED | OUTPATIENT
Start: 2020-01-01 | End: 2020-01-01 | Stop reason: HOSPADM

## 2020-01-01 RX ORDER — FUROSEMIDE 10 MG/ML
40 INJECTION INTRAMUSCULAR; INTRAVENOUS ONCE
Status: COMPLETED | OUTPATIENT
Start: 2020-01-01 | End: 2020-01-01

## 2020-01-01 RX ORDER — MORPHINE SULFATE 4 MG/ML
2 INJECTION, SOLUTION INTRAMUSCULAR; INTRAVENOUS
Status: COMPLETED | OUTPATIENT
Start: 2020-01-01 | End: 2020-01-01

## 2020-01-01 RX ORDER — IPRATROPIUM BROMIDE 0.5 MG/2.5ML
SOLUTION RESPIRATORY (INHALATION)
Status: DISPENSED
Start: 2020-01-01 | End: 2020-01-01

## 2020-01-01 RX ORDER — ROCURONIUM BROMIDE 10 MG/ML
INJECTION, SOLUTION INTRAVENOUS
Status: DISCONTINUED | OUTPATIENT
Start: 2020-01-01 | End: 2020-01-01

## 2020-01-01 RX ORDER — LISINOPRIL 20 MG/1
20 TABLET ORAL DAILY
Qty: 90 TABLET | Refills: 3 | Status: SHIPPED | OUTPATIENT
Start: 2020-01-01 | End: 2020-01-01

## 2020-01-01 RX ORDER — SODIUM CHLORIDE 9 MG/ML
INJECTION, SOLUTION INTRAVENOUS CONTINUOUS PRN
Status: DISCONTINUED | OUTPATIENT
Start: 2020-01-01 | End: 2020-01-01

## 2020-01-01 RX ORDER — POTASSIUM CHLORIDE 750 MG/1
10 TABLET, EXTENDED RELEASE ORAL DAILY
Status: DISCONTINUED | OUTPATIENT
Start: 2020-01-01 | End: 2020-01-01

## 2020-01-01 RX ORDER — HALOPERIDOL 5 MG/ML
5 INJECTION INTRAMUSCULAR EVERY 6 HOURS PRN
Status: DISCONTINUED | OUTPATIENT
Start: 2020-01-01 | End: 2020-01-01 | Stop reason: HOSPADM

## 2020-01-01 RX ORDER — FENTANYL CITRATE 50 UG/ML
INJECTION, SOLUTION INTRAMUSCULAR; INTRAVENOUS
Status: DISCONTINUED | OUTPATIENT
Start: 2020-01-01 | End: 2020-01-01

## 2020-01-01 RX ORDER — LIDOCAINE HYDROCHLORIDE AND EPINEPHRINE 20; 10 MG/ML; UG/ML
10 INJECTION, SOLUTION INFILTRATION; PERINEURAL
Status: DISCONTINUED | OUTPATIENT
Start: 2020-01-01 | End: 2020-01-01

## 2020-01-01 RX ORDER — SODIUM CHLORIDE, SODIUM LACTATE, POTASSIUM CHLORIDE, CALCIUM CHLORIDE 600; 310; 30; 20 MG/100ML; MG/100ML; MG/100ML; MG/100ML
INJECTION, SOLUTION INTRAVENOUS CONTINUOUS
Status: DISCONTINUED | OUTPATIENT
Start: 2020-01-01 | End: 2020-01-01

## 2020-01-01 RX ORDER — IPRATROPIUM BROMIDE AND ALBUTEROL SULFATE 2.5; .5 MG/3ML; MG/3ML
3 SOLUTION RESPIRATORY (INHALATION) ONCE
Status: COMPLETED | OUTPATIENT
Start: 2020-01-01 | End: 2020-01-01

## 2020-01-01 RX ORDER — PROPOFOL 10 MG/ML
VIAL (ML) INTRAVENOUS
Status: DISCONTINUED | OUTPATIENT
Start: 2020-01-01 | End: 2020-01-01

## 2020-01-01 RX ORDER — APIXABAN 5 MG/1
TABLET, FILM COATED ORAL
Qty: 180 TABLET | Refills: 0 | OUTPATIENT
Start: 2020-01-01

## 2020-01-01 RX ORDER — QUETIAPINE FUMARATE 25 MG/1
50 TABLET, FILM COATED ORAL ONCE
Status: COMPLETED | OUTPATIENT
Start: 2020-01-01 | End: 2020-01-01

## 2020-01-01 RX ORDER — DOCUSATE SODIUM 100 MG/1
100 CAPSULE, LIQUID FILLED ORAL 2 TIMES DAILY
Status: DISCONTINUED | OUTPATIENT
Start: 2020-01-01 | End: 2020-01-01 | Stop reason: HOSPADM

## 2020-01-01 RX ORDER — FUROSEMIDE 20 MG/1
20 TABLET ORAL 2 TIMES DAILY
Qty: 180 TABLET | Refills: 3 | Status: ON HOLD | OUTPATIENT
Start: 2020-01-01 | End: 2020-01-01 | Stop reason: HOSPADM

## 2020-01-01 RX ORDER — IPRATROPIUM BROMIDE AND ALBUTEROL SULFATE 2.5; .5 MG/3ML; MG/3ML
3 SOLUTION RESPIRATORY (INHALATION) ONCE
Status: DISCONTINUED | OUTPATIENT
Start: 2020-01-01 | End: 2020-01-01

## 2020-01-01 RX ORDER — ALBUTEROL SULFATE 2.5 MG/.5ML
5 SOLUTION RESPIRATORY (INHALATION) ONCE
Status: COMPLETED | OUTPATIENT
Start: 2020-01-01 | End: 2020-01-01

## 2020-01-01 RX ORDER — MORPHINE SULFATE 4 MG/ML
4 INJECTION, SOLUTION INTRAMUSCULAR; INTRAVENOUS
Status: COMPLETED | OUTPATIENT
Start: 2020-01-01 | End: 2020-01-01

## 2020-01-01 RX ORDER — ONDANSETRON 2 MG/ML
8 INJECTION INTRAMUSCULAR; INTRAVENOUS
Status: COMPLETED | OUTPATIENT
Start: 2020-01-01 | End: 2020-01-01

## 2020-01-01 RX ORDER — FUROSEMIDE 20 MG/1
20 TABLET ORAL 2 TIMES DAILY
Status: DISCONTINUED | OUTPATIENT
Start: 2020-01-01 | End: 2020-01-01

## 2020-01-01 RX ORDER — LIDOCAINE HCL/EPINEPHRINE/PF 2%-1:200K
10 VIAL (ML) INJECTION
Status: COMPLETED | OUTPATIENT
Start: 2020-01-01 | End: 2020-01-01

## 2020-01-01 RX ORDER — LIDOCAINE HYDROCHLORIDE 20 MG/ML
INJECTION, SOLUTION EPIDURAL; INFILTRATION; INTRACAUDAL; PERINEURAL
Status: DISCONTINUED | OUTPATIENT
Start: 2020-01-01 | End: 2020-01-01

## 2020-01-01 RX ORDER — FUROSEMIDE 10 MG/ML
20 INJECTION INTRAMUSCULAR; INTRAVENOUS ONCE
Status: COMPLETED | OUTPATIENT
Start: 2020-01-01 | End: 2020-01-01

## 2020-01-01 RX ORDER — HALOPERIDOL 5 MG/ML
5 INJECTION INTRAMUSCULAR
Status: COMPLETED | OUTPATIENT
Start: 2020-01-01 | End: 2020-01-01

## 2020-01-01 RX ORDER — HYDROCODONE BITARTRATE AND ACETAMINOPHEN 5; 325 MG/1; MG/1
1 TABLET ORAL EVERY 4 HOURS PRN
Refills: 0
Start: 2020-01-01 | End: 2020-01-01

## 2020-01-01 RX ORDER — DEXTROSE 50 % IN WATER (D50W) INTRAVENOUS SYRINGE
25
Status: DISCONTINUED | OUTPATIENT
Start: 2020-01-01 | End: 2020-01-01 | Stop reason: HOSPADM

## 2020-01-01 RX ORDER — METOPROLOL SUCCINATE 50 MG/1
50 TABLET, EXTENDED RELEASE ORAL DAILY
Status: DISCONTINUED | OUTPATIENT
Start: 2020-01-01 | End: 2020-01-01 | Stop reason: HOSPADM

## 2020-01-01 RX ORDER — SIMVASTATIN 20 MG/1
20 TABLET, FILM COATED ORAL NIGHTLY
Status: DISCONTINUED | OUTPATIENT
Start: 2020-01-01 | End: 2020-01-01 | Stop reason: HOSPADM

## 2020-01-01 RX ORDER — HYDROCODONE BITARTRATE AND ACETAMINOPHEN 500; 5 MG/1; MG/1
TABLET ORAL CONTINUOUS
Status: ACTIVE | OUTPATIENT
Start: 2020-01-01 | End: 2020-01-01

## 2020-01-01 RX ORDER — IPRATROPIUM BROMIDE AND ALBUTEROL SULFATE 2.5; .5 MG/3ML; MG/3ML
3 SOLUTION RESPIRATORY (INHALATION) EVERY 4 HOURS PRN
Qty: 90 ML | Refills: 0 | Status: SHIPPED | OUTPATIENT
Start: 2020-01-01 | End: 2021-05-28

## 2020-01-01 RX ORDER — FINASTERIDE 5 MG/1
5 TABLET, FILM COATED ORAL DAILY
Status: DISCONTINUED | OUTPATIENT
Start: 2020-01-01 | End: 2020-01-01 | Stop reason: HOSPADM

## 2020-01-01 RX ORDER — HYDROCODONE BITARTRATE AND ACETAMINOPHEN 5; 325 MG/1; MG/1
1 TABLET ORAL EVERY 4 HOURS PRN
Status: DISCONTINUED | OUTPATIENT
Start: 2020-01-01 | End: 2020-01-01 | Stop reason: HOSPADM

## 2020-01-01 RX ORDER — CEFEPIME HYDROCHLORIDE 1 G/50ML
1 INJECTION, SOLUTION INTRAVENOUS
Status: DISCONTINUED | OUTPATIENT
Start: 2020-01-01 | End: 2020-01-01

## 2020-01-01 RX ORDER — ALBUMIN HUMAN 250 G/1000ML
12.5 SOLUTION INTRAVENOUS ONCE
Status: COMPLETED | OUTPATIENT
Start: 2020-01-01 | End: 2020-01-01

## 2020-01-01 RX ORDER — MORPHINE SULFATE 2 MG/ML
1 INJECTION, SOLUTION INTRAMUSCULAR; INTRAVENOUS EVERY 4 HOURS PRN
Status: DISCONTINUED | OUTPATIENT
Start: 2020-01-01 | End: 2020-01-01 | Stop reason: HOSPADM

## 2020-01-01 RX ORDER — IPRATROPIUM BROMIDE AND ALBUTEROL SULFATE 2.5; .5 MG/3ML; MG/3ML
3 SOLUTION RESPIRATORY (INHALATION) EVERY 4 HOURS PRN
Status: DISCONTINUED | OUTPATIENT
Start: 2020-01-01 | End: 2020-01-01 | Stop reason: HOSPADM

## 2020-01-01 RX ORDER — HALOPERIDOL 5 MG/ML
2 INJECTION INTRAMUSCULAR EVERY 6 HOURS PRN
Status: DISCONTINUED | OUTPATIENT
Start: 2020-01-01 | End: 2020-01-01

## 2020-01-01 RX ORDER — SODIUM CHLORIDE, SODIUM LACTATE, POTASSIUM CHLORIDE, CALCIUM CHLORIDE 600; 310; 30; 20 MG/100ML; MG/100ML; MG/100ML; MG/100ML
INJECTION, SOLUTION INTRAVENOUS CONTINUOUS
Status: DISCONTINUED | OUTPATIENT
Start: 2020-01-01 | End: 2020-01-01 | Stop reason: HOSPADM

## 2020-01-01 RX ORDER — VANCOMYCIN HCL IN 5 % DEXTROSE 1G/250ML
1000 PLASTIC BAG, INJECTION (ML) INTRAVENOUS ONCE
Status: COMPLETED | OUTPATIENT
Start: 2020-01-01 | End: 2020-01-01

## 2020-01-01 RX ORDER — ONDANSETRON 2 MG/ML
8 INJECTION INTRAMUSCULAR; INTRAVENOUS EVERY 6 HOURS PRN
Status: DISCONTINUED | OUTPATIENT
Start: 2020-01-01 | End: 2020-01-01 | Stop reason: HOSPADM

## 2020-01-01 RX ORDER — LEVOTHYROXINE SODIUM 50 UG/1
50 TABLET ORAL
Status: DISCONTINUED | OUTPATIENT
Start: 2020-01-01 | End: 2020-01-01 | Stop reason: HOSPADM

## 2020-01-01 RX ORDER — ALLOPURINOL 100 MG/1
300 TABLET ORAL DAILY
Status: DISCONTINUED | OUTPATIENT
Start: 2020-01-01 | End: 2020-01-01

## 2020-01-01 RX ORDER — METOPROLOL SUCCINATE 25 MG/1
25 TABLET, EXTENDED RELEASE ORAL DAILY
Qty: 90 TABLET | Refills: 4 | Status: SHIPPED | OUTPATIENT
Start: 2020-01-01 | End: 2020-01-01

## 2020-01-01 RX ORDER — SIMVASTATIN 20 MG/1
20 TABLET, FILM COATED ORAL NIGHTLY
Qty: 90 TABLET | Refills: 3 | Status: ON HOLD | OUTPATIENT
Start: 2020-01-01 | End: 2020-01-01 | Stop reason: HOSPADM

## 2020-01-01 RX ORDER — ETOMIDATE 2 MG/ML
INJECTION INTRAVENOUS
Status: DISCONTINUED | OUTPATIENT
Start: 2020-01-01 | End: 2020-01-01

## 2020-01-01 RX ORDER — MAGNESIUM SULFATE HEPTAHYDRATE 40 MG/ML
2 INJECTION, SOLUTION INTRAVENOUS
Status: ACTIVE | OUTPATIENT
Start: 2020-01-01 | End: 2020-01-01

## 2020-01-01 RX ORDER — METOPROLOL SUCCINATE 50 MG/1
50 TABLET, EXTENDED RELEASE ORAL DAILY
Qty: 90 TABLET | Refills: 3 | Status: ON HOLD | OUTPATIENT
Start: 2020-01-01 | End: 2020-01-01 | Stop reason: HOSPADM

## 2020-01-01 RX ORDER — DEXTROSE MONOHYDRATE AND SODIUM CHLORIDE 5; .45 G/100ML; G/100ML
INJECTION, SOLUTION INTRAVENOUS CONTINUOUS
Status: DISCONTINUED | OUTPATIENT
Start: 2020-01-01 | End: 2020-01-01

## 2020-01-01 RX ORDER — ALBUTEROL SULFATE 2.5 MG/.5ML
SOLUTION RESPIRATORY (INHALATION)
Status: DISPENSED
Start: 2020-01-01 | End: 2020-01-01

## 2020-01-01 RX ADMIN — CEFEPIME HYDROCHLORIDE 1 G: 1 INJECTION, SOLUTION INTRAVENOUS at 04:05

## 2020-01-01 RX ADMIN — ALLOPURINOL 300 MG: 100 TABLET ORAL at 08:05

## 2020-01-01 RX ADMIN — INSULIN HUMAN 8 UNITS: 100 INJECTION, SOLUTION PARENTERAL at 03:05

## 2020-01-01 RX ADMIN — HALOPERIDOL LACTATE 5 MG: 5 INJECTION, SOLUTION INTRAMUSCULAR at 03:05

## 2020-01-01 RX ADMIN — APIXABAN 2.5 MG: 2.5 TABLET, FILM COATED ORAL at 09:05

## 2020-01-01 RX ADMIN — FENTANYL CITRATE 100 MCG: 50 INJECTION, SOLUTION INTRAMUSCULAR; INTRAVENOUS at 06:05

## 2020-01-01 RX ADMIN — IPRATROPIUM BROMIDE AND ALBUTEROL SULFATE 3 ML: .5; 3 SOLUTION RESPIRATORY (INHALATION) at 01:05

## 2020-01-01 RX ADMIN — SODIUM PHOSPHATE, MONOBASIC, MONOHYDRATE 15 MMOL: 276; 142 INJECTION, SOLUTION INTRAVENOUS at 02:05

## 2020-01-01 RX ADMIN — PHENYLEPHRINE HYDROCHLORIDE 100 MCG: 10 INJECTION INTRAVENOUS at 06:05

## 2020-01-01 RX ADMIN — METOPROLOL SUCCINATE 50 MG: 25 TABLET, FILM COATED, EXTENDED RELEASE ORAL at 08:05

## 2020-01-01 RX ADMIN — SIMVASTATIN 20 MG: 20 TABLET, FILM COATED ORAL at 10:05

## 2020-01-01 RX ADMIN — SIMVASTATIN 20 MG: 20 TABLET, FILM COATED ORAL at 11:05

## 2020-01-01 RX ADMIN — PROMETHAZINE HYDROCHLORIDE 12.5 MG: 25 INJECTION INTRAMUSCULAR; INTRAVENOUS at 03:05

## 2020-01-01 RX ADMIN — DOCUSATE SODIUM 100 MG: 100 CAPSULE, LIQUID FILLED ORAL at 08:05

## 2020-01-01 RX ADMIN — QUETIAPINE FUMARATE 50 MG: 25 TABLET ORAL at 08:05

## 2020-01-01 RX ADMIN — PANTOPRAZOLE SODIUM 40 MG: 40 INJECTION, POWDER, FOR SOLUTION INTRAVENOUS at 08:05

## 2020-01-01 RX ADMIN — CEFEPIME 2 G: 2 INJECTION, POWDER, FOR SOLUTION INTRAVENOUS at 10:05

## 2020-01-01 RX ADMIN — SIMVASTATIN 20 MG: 20 TABLET, FILM COATED ORAL at 09:05

## 2020-01-01 RX ADMIN — SIMVASTATIN 20 MG: 20 TABLET, FILM COATED ORAL at 08:05

## 2020-01-01 RX ADMIN — FUROSEMIDE 40 MG: 10 INJECTION, SOLUTION INTRAMUSCULAR; INTRAVENOUS at 05:05

## 2020-01-01 RX ADMIN — FINASTERIDE 5 MG: 5 TABLET, FILM COATED ORAL at 08:05

## 2020-01-01 RX ADMIN — PIPERACILLIN AND TAZOBACTAM 4.5 G: 4; .5 INJECTION, POWDER, LYOPHILIZED, FOR SOLUTION INTRAVENOUS; PARENTERAL at 08:05

## 2020-01-01 RX ADMIN — LEVOTHYROXINE SODIUM 50 MCG: 50 TABLET ORAL at 06:05

## 2020-01-01 RX ADMIN — CEFEPIME 2 G: 2 INJECTION, POWDER, FOR SOLUTION INTRAVENOUS at 09:05

## 2020-01-01 RX ADMIN — ALBUMIN (HUMAN) 12.5 G: 25 SOLUTION INTRAVENOUS at 10:05

## 2020-01-01 RX ADMIN — INSULIN HUMAN 10 UNITS: 100 INJECTION, SOLUTION PARENTERAL at 05:05

## 2020-01-01 RX ADMIN — SODIUM PHOSPHATE, MONOBASIC, MONOHYDRATE 15 MMOL: 276; 142 INJECTION, SOLUTION INTRAVENOUS at 05:05

## 2020-01-01 RX ADMIN — DOCUSATE SODIUM 100 MG: 100 CAPSULE, LIQUID FILLED ORAL at 09:05

## 2020-01-01 RX ADMIN — PHENYLEPHRINE HYDROCHLORIDE 200 MCG: 10 INJECTION INTRAVENOUS at 06:05

## 2020-01-01 RX ADMIN — VANCOMYCIN HYDROCHLORIDE 2000 MG: 100 INJECTION, POWDER, LYOPHILIZED, FOR SOLUTION INTRAVENOUS at 12:05

## 2020-01-01 RX ADMIN — IOHEXOL 10 ML: 300 INJECTION, SOLUTION INTRAVENOUS at 07:05

## 2020-01-01 RX ADMIN — MORPHINE SULFATE 1 MG: 2 INJECTION, SOLUTION INTRAMUSCULAR; INTRAVENOUS at 09:05

## 2020-01-01 RX ADMIN — LEVOTHYROXINE SODIUM 50 MCG: 50 TABLET ORAL at 05:05

## 2020-01-01 RX ADMIN — IPRATROPIUM BROMIDE AND ALBUTEROL SULFATE 3 ML: .5; 3 SOLUTION RESPIRATORY (INHALATION) at 10:05

## 2020-01-01 RX ADMIN — SODIUM CHLORIDE 1000 ML: 0.9 INJECTION, SOLUTION INTRAVENOUS at 05:05

## 2020-01-01 RX ADMIN — DEXTROSE MONOHYDRATE 25 G: 25 INJECTION, SOLUTION INTRAVENOUS at 11:05

## 2020-01-01 RX ADMIN — PROPOFOL 30 MG: 10 INJECTION, EMULSION INTRAVENOUS at 06:05

## 2020-01-01 RX ADMIN — ONDANSETRON 8 MG: 2 INJECTION INTRAMUSCULAR; INTRAVENOUS at 04:05

## 2020-01-01 RX ADMIN — HALOPERIDOL LACTATE 5 MG: 5 INJECTION, SOLUTION INTRAMUSCULAR at 07:05

## 2020-01-01 RX ADMIN — HYDROCODONE BITARTRATE AND ACETAMINOPHEN 1 TABLET: 5; 325 TABLET ORAL at 02:05

## 2020-01-01 RX ADMIN — APIXABAN 2.5 MG: 2.5 TABLET, FILM COATED ORAL at 08:05

## 2020-01-01 RX ADMIN — IPRATROPIUM BROMIDE AND ALBUTEROL SULFATE 3 ML: .5; 3 SOLUTION RESPIRATORY (INHALATION) at 08:05

## 2020-01-01 RX ADMIN — ALBUTEROL SULFATE 5 MG: 2.5 SOLUTION RESPIRATORY (INHALATION) at 03:05

## 2020-01-01 RX ADMIN — HALOPERIDOL LACTATE 2 MG: 5 INJECTION, SOLUTION INTRAMUSCULAR at 10:05

## 2020-01-01 RX ADMIN — QUETIAPINE FUMARATE 50 MG: 25 TABLET ORAL at 09:05

## 2020-01-01 RX ADMIN — SODIUM PHOSPHATE, MONOBASIC, MONOHYDRATE 15 MMOL: 276; 142 INJECTION, SOLUTION INTRAVENOUS at 12:05

## 2020-01-01 RX ADMIN — APIXABAN 2.5 MG: 2.5 TABLET, FILM COATED ORAL at 12:05

## 2020-01-01 RX ADMIN — QUETIAPINE FUMARATE 50 MG: 25 TABLET ORAL at 12:05

## 2020-01-01 RX ADMIN — ALBUTEROL SULFATE 5 MG: 2.5 SOLUTION RESPIRATORY (INHALATION) at 05:05

## 2020-01-01 RX ADMIN — HYDROCODONE BITARTRATE AND ACETAMINOPHEN 1 TABLET: 5; 325 TABLET ORAL at 08:05

## 2020-01-01 RX ADMIN — IOHEXOL 10 ML: 300 INJECTION, SOLUTION INTRAVENOUS at 06:05

## 2020-01-01 RX ADMIN — HALOPERIDOL LACTATE 5 MG: 5 INJECTION, SOLUTION INTRAMUSCULAR at 08:05

## 2020-01-01 RX ADMIN — IPRATROPIUM BROMIDE AND ALBUTEROL SULFATE 3 ML: .5; 3 SOLUTION RESPIRATORY (INHALATION) at 12:05

## 2020-01-01 RX ADMIN — SODIUM POLYSTYRENE SULFONATE 30 G: 15 SUSPENSION ORAL; RECTAL at 03:05

## 2020-01-01 RX ADMIN — HYDROCODONE BITARTRATE AND ACETAMINOPHEN 1 TABLET: 5; 325 TABLET ORAL at 10:05

## 2020-01-01 RX ADMIN — CALCIUM GLUCONATE 1000 MG: 94 INJECTION, SOLUTION INTRAVENOUS at 03:05

## 2020-01-01 RX ADMIN — DEXTROSE MONOHYDRATE 25 G: 25 INJECTION, SOLUTION INTRAVENOUS at 03:05

## 2020-01-01 RX ADMIN — FUROSEMIDE 40 MG: 10 INJECTION, SOLUTION INTRAVENOUS at 04:05

## 2020-01-01 RX ADMIN — ALBUMIN (HUMAN) 12.5 G: 12.5 SOLUTION INTRAVENOUS at 04:05

## 2020-01-01 RX ADMIN — HALOPERIDOL LACTATE 5 MG: 5 INJECTION, SOLUTION INTRAMUSCULAR at 02:05

## 2020-01-01 RX ADMIN — FUROSEMIDE 20 MG: 10 INJECTION, SOLUTION INTRAMUSCULAR; INTRAVENOUS at 07:05

## 2020-01-01 RX ADMIN — LIDOCAINE HYDROCHLORIDE,EPINEPHRINE BITARTRATE 10 ML: 20; .005 INJECTION, SOLUTION EPIDURAL; INFILTRATION; INTRACAUDAL; PERINEURAL at 11:05

## 2020-01-01 RX ADMIN — APIXABAN 2.5 MG: 2.5 TABLET, FILM COATED ORAL at 11:05

## 2020-01-01 RX ADMIN — IPRATROPIUM BROMIDE AND ALBUTEROL SULFATE 3 ML: .5; 3 SOLUTION RESPIRATORY (INHALATION) at 06:05

## 2020-01-01 RX ADMIN — SODIUM CHLORIDE, SODIUM LACTATE, POTASSIUM CHLORIDE, AND CALCIUM CHLORIDE: .6; .31; .03; .02 INJECTION, SOLUTION INTRAVENOUS at 03:05

## 2020-01-01 RX ADMIN — PIPERACILLIN AND TAZOBACTAM 4.5 G: 4; .5 INJECTION, POWDER, LYOPHILIZED, FOR SOLUTION INTRAVENOUS; PARENTERAL at 05:05

## 2020-01-01 RX ADMIN — MORPHINE SULFATE 4 MG: 4 INJECTION INTRAVENOUS at 05:05

## 2020-01-01 RX ADMIN — SODIUM CHLORIDE 500 ML: 0.9 INJECTION, SOLUTION INTRAVENOUS at 02:05

## 2020-01-01 RX ADMIN — SODIUM CHLORIDE 500 ML: 0.9 INJECTION, SOLUTION INTRAVENOUS at 11:05

## 2020-01-01 RX ADMIN — VANCOMYCIN HYDROCHLORIDE 1000 MG: 1 INJECTION, POWDER, LYOPHILIZED, FOR SOLUTION INTRAVENOUS at 05:05

## 2020-01-01 RX ADMIN — HYDROCODONE BITARTRATE AND ACETAMINOPHEN 1 TABLET: 5; 325 TABLET ORAL at 11:05

## 2020-01-01 RX ADMIN — SODIUM CHLORIDE, SODIUM LACTATE, POTASSIUM CHLORIDE, AND CALCIUM CHLORIDE: .6; .31; .03; .02 INJECTION, SOLUTION INTRAVENOUS at 12:05

## 2020-01-01 RX ADMIN — SODIUM CHLORIDE 1000 ML: 0.9 INJECTION, SOLUTION INTRAVENOUS at 10:05

## 2020-01-01 RX ADMIN — MORPHINE SULFATE 4 MG: 4 INJECTION INTRAVENOUS at 04:05

## 2020-01-01 RX ADMIN — VANCOMYCIN HYDROCHLORIDE 1250 MG: 1.25 INJECTION, POWDER, LYOPHILIZED, FOR SOLUTION INTRAVENOUS at 02:05

## 2020-01-01 RX ADMIN — VANCOMYCIN HYDROCHLORIDE 1500 MG: 1.5 INJECTION, POWDER, LYOPHILIZED, FOR SOLUTION INTRAVENOUS at 02:05

## 2020-01-01 RX ADMIN — PROMETHAZINE HYDROCHLORIDE 6.25 MG: 25 INJECTION INTRAMUSCULAR; INTRAVENOUS at 06:05

## 2020-01-01 RX ADMIN — PIPERACILLIN AND TAZOBACTAM 4.5 G: 4; .5 INJECTION, POWDER, LYOPHILIZED, FOR SOLUTION INTRAVENOUS; PARENTERAL at 04:05

## 2020-01-01 RX ADMIN — ROCURONIUM BROMIDE 50 MG: 10 INJECTION, SOLUTION INTRAVENOUS at 06:05

## 2020-01-01 RX ADMIN — MORPHINE SULFATE 2 MG: 4 INJECTION INTRAVENOUS at 07:05

## 2020-01-01 RX ADMIN — DEXTROSE AND SODIUM CHLORIDE: 5; .45 INJECTION, SOLUTION INTRAVENOUS at 09:05

## 2020-01-01 RX ADMIN — SUGAMMADEX 200 MG: 100 INJECTION, SOLUTION INTRAVENOUS at 07:05

## 2020-01-01 RX ADMIN — DOCUSATE SODIUM 100 MG: 100 CAPSULE, LIQUID FILLED ORAL at 11:05

## 2020-01-01 RX ADMIN — LIDOCAINE HYDROCHLORIDE 80 MG: 20 INJECTION, SOLUTION EPIDURAL; INFILTRATION; INTRACAUDAL; PERINEURAL at 06:05

## 2020-01-01 RX ADMIN — MORPHINE SULFATE 1 MG: 2 INJECTION, SOLUTION INTRAMUSCULAR; INTRAVENOUS at 04:05

## 2020-01-01 RX ADMIN — PIPERACILLIN AND TAZOBACTAM 4.5 G: 4; .5 INJECTION, POWDER, LYOPHILIZED, FOR SOLUTION INTRAVENOUS; PARENTERAL at 01:05

## 2020-01-01 RX ADMIN — CLOSTRIDIUM TETANI TOXOID ANTIGEN (FORMALDEHYDE INACTIVATED), CORYNEBACTERIUM DIPHTHERIAE TOXOID ANTIGEN (FORMALDEHYDE INACTIVATED), BORDETELLA PERTUSSIS TOXOID ANTIGEN (GLUTARALDEHYDE INACTIVATED), BORDETELLA PERTUSSIS FILAMENTOUS HEMAGGLUTININ ANTIGEN (FORMALDEHYDE INACTIVATED), BORDETELLA PERTUSSIS PERTACTIN ANTIGEN, AND BORDETELLA PERTUSSIS FIMBRIAE 2/3 ANTIGEN 0.5 ML: 5; 2; 2.5; 5; 3; 5 INJECTION, SUSPENSION INTRAMUSCULAR at 10:05

## 2020-01-01 RX ADMIN — IPRATROPIUM BROMIDE AND ALBUTEROL SULFATE 3 ML: .5; 3 SOLUTION RESPIRATORY (INHALATION) at 05:05

## 2020-01-01 RX ADMIN — ETOMIDATE 10 MG: 2 INJECTION, SOLUTION INTRAVENOUS at 06:05

## 2020-01-01 RX ADMIN — PHENYLEPHRINE HYDROCHLORIDE 15 MCG/MIN: 10 INJECTION INTRAVENOUS at 06:05

## 2020-01-01 RX ADMIN — PIPERACILLIN AND TAZOBACTAM 4.5 G: 4; .5 INJECTION, POWDER, LYOPHILIZED, FOR SOLUTION INTRAVENOUS; PARENTERAL at 12:05

## 2020-01-01 RX ADMIN — SODIUM CHLORIDE: 0.9 INJECTION, SOLUTION INTRAVENOUS at 06:05

## 2020-01-02 NOTE — TELEPHONE ENCOUNTER
"Received [employer] device alert, "No messages received for at least 21 days".  Reached out to patient to see if remote monitor was still working.  Patient states he unplugged it as he didn't think he still needed to use it.  He will find monitor and plug it in again.  States he may have broken the plug when he unplugged.  Ask patient to call the clinic if the plug was broken and we would try to get him a new monitor. Patient verbalized understanding.    "

## 2020-01-31 NOTE — TELEPHONE ENCOUNTER
I called and spoke to mr. Rangel,    I wanted to schedule him the MUGA  Test that  ordered.    Patient stated that he will not go to [facility], due to he has no transportation, and stated he doesn't like to go to [facility].    Patient stated ' just cx all appointment".    Patient stated that if  Changes his mind he will call us back.      edgar Dougherty.

## 2020-02-11 NOTE — PROGRESS NOTES
Cardiology Clinic note    Subjective:   Patient ID:  Danyel Rangel is a 84 y.o. male who presents for follow-up of CAD/CABG    HPI:   Danyel Rangel  has a past medical history of Atrial fibrillation, Coronary artery disease, Encounter for blood transfusion, Hyperlipidemia, and Hypertension.  Danyel Rangel 84 y.o. male is here follow up and feeling well without any new complaints. He has CAD s/p CABG with a previously normalized EF (echo 2016) and now EF 45%  (echo 8/219), 98% RV pacing on dual lead ICD, HTN, HLP, and CKD III.   He didn't get his MUGA as requested by Dr Fernández. Pt didn't want to drive to [place].  Denies any acute CP, no orthopnea, no heart failure.  He brought his biotronik home device to the clinic. NO falls. Still active around the house. No recent hospitalizations    Vitals  Vitals:    02/11/20 0833 02/11/20 0837   BP: (!) 117/57 117/69   Pulse: 67    Weight: 73.9 kg (163 lb)        Patient Active Problem List    Diagnosis Date Noted    CKD (chronic kidney disease), stage III 10/25/2018    Benign prostatic hyperplasia with weak urinary stream 10/25/2018    Senile purpura 04/25/2018    S/P CABG (coronary artery bypass graft) 01/27/2017    Hypothyroidism 11/10/2016    Abnormal LFTs 11/10/2016    Trigger index finger of right hand 11/10/2016    Coronary artery disease involving native coronary artery of native heart without angina pectoris 10/14/2016    Chronic atrial fibrillation 06/13/2016    HTN (hypertension), benign 06/13/2016    Hyperlipidemia 06/13/2016    AICD (automatic cardioverter/defibrillator) present 06/13/2016       Patient's Medications   New Prescriptions    No medications on file   Previous Medications    ALLOPURINOL (ZYLOPRIM) 300 MG TABLET    TAKE 1 TABLET EVERY DAY    FINASTERIDE (PROSCAR) 5 MG TABLET    Take 1 tablet (5 mg total) by mouth once daily.    LEVOTHYROXINE (SYNTHROID) 50 MCG TABLET    TAKE 1 TABLET EVERY DAY    POTASSIUM CHLORIDE SA  (K-DUR,KLOR-CON) 10 MEQ TABLET    TAKE 1 TABLET EVERY DAY   Modified Medications    Modified Medication Previous Medication    APIXABAN (ELIQUIS) 5 MG TAB apixaban (ELIQUIS) 5 mg Tab       Take 1 tablet (5 mg total) by mouth 2 (two) times daily.    Take 1 tablet (5 mg total) by mouth 2 (two) times daily.    FUROSEMIDE (LASIX) 20 MG TABLET furosemide (LASIX) 20 MG tablet       Take 1 tablet (20 mg total) by mouth 2 (two) times daily.    TAKE 1 TABLET TWICE DAILY    LISINOPRIL (PRINIVIL,ZESTRIL) 20 MG TABLET lisinopril (PRINIVIL,ZESTRIL) 20 MG tablet       Take 1 tablet (20 mg total) by mouth once daily.    TAKE 1 TABLET EVERY DAY    METOPROLOL SUCCINATE (TOPROL-XL) 25 MG 24 HR TABLET metoprolol succinate (TOPROL-XL) 100 MG 24 hr tablet       Take 1 tablet (25 mg total) by mouth once daily.    TAKE 1 TABLET (100 MG TOTAL) BY MOUTH ONCE DAILY.    SIMVASTATIN (ZOCOR) 20 MG TABLET simvastatin (ZOCOR) 20 MG tablet       Take 1 tablet (20 mg total) by mouth every evening.    TAKE 1 TABLET EVERY EVENING   Discontinued Medications    No medications on file         Review of Systems   Constitution: Negative for chills, decreased appetite, malaise/fatigue and weight gain.   HENT: Negative for congestion and ear discharge.    Eyes: Negative for blurred vision and double vision.   Cardiovascular: Negative for chest pain, cyanosis, dyspnea on exertion, irregular heartbeat, leg swelling, near-syncope, orthopnea, palpitations and syncope.   Respiratory: Negative for cough, shortness of breath and sleep disturbances due to breathing.    Skin: Negative for color change and dry skin.   Musculoskeletal: Negative for joint pain, joint swelling and muscle cramps.   Gastrointestinal: Negative for bloating, heartburn, hematemesis and hematochezia.   Genitourinary: Negative for bladder incontinence and dysuria.   Neurological: Negative for aphonia, excessive daytime sleepiness, dizziness, focal weakness, headaches, light-headedness, loss of  balance and weakness.   Psychiatric/Behavioral: Negative for altered mental status, depression and memory loss. The patient does not have insomnia and is not nervous/anxious.          Objective:   Physical Exam   Constitutional: He is oriented to person, place, and time. He appears well-developed and well-nourished.   HENT:   Head: Normocephalic and atraumatic.   Eyes: Conjunctivae and EOM are normal.   Neck: Normal range of motion. Neck supple. No JVD present.   Cardiovascular: Normal rate and regular rhythm.   Murmur heard.  Pulmonary/Chest: Effort normal and breath sounds normal. No respiratory distress. He has no wheezes. He has no rales.   Abdominal: Soft. Bowel sounds are normal. He exhibits no distension.   Musculoskeletal: Normal range of motion. He exhibits no edema.   Neurological: He is alert and oriented to person, place, and time.   Skin: Skin is warm and dry. No erythema.   Psychiatric: He has a normal mood and affect. His behavior is normal. Judgment and thought content normal.   Nursing note and vitals reviewed.      Lab Results    Lab Results   Component Value Date     11/23/2018    K 3.6 11/23/2018     11/23/2018    CO2 31 (H) 11/23/2018    BUN 31 (H) 11/23/2018    CREATININE 1.49 (H) 11/23/2018    GLU 93 11/23/2018    HGBA1C 5.5 04/26/2018    AST 32 04/26/2018    ALT 24 04/26/2018    ALBUMIN 4.2 11/23/2018    PROT 6.8 04/26/2018    BILITOT 1.8 (H) 04/26/2018    WBC 6.45 04/26/2018    HGB 13.1 (L) 04/26/2018    HCT 41.6 04/26/2018    MCV 99 (H) 04/26/2018     (L) 04/26/2018    TSH 0.952 04/26/2018    CHOL 107 (L) 04/26/2018    HDL 45 04/26/2018    LDLCALC 50.8 (L) 04/26/2018    TRIG 56 04/26/2018       Lipid panel  Lab Results   Component Value Date    CHOL 107 (L) 04/26/2018     Lab Results   Component Value Date    HDL 45 04/26/2018     Lab Results   Component Value Date    LDLCALC 50.8 (L) 04/26/2018     Lab Results   Component Value Date    TRIG 56 04/26/2018       Cardiac  Studies  Significant Imaging:     Echo 9/2019                 EF 45%              Severe LAE              Mod AI              Mild AS              Severe TR              Aortic valve mean gradient 5 mmHg              Diastolic dysfunction        ICD interrogation 7/2019 (see above)  Ecg:L v-paced rhythm    Cath study :     Assessment:     1. AICD (automatic cardioverter/defibrillator) present    2. HTN (hypertension), benign    3. Chronic atrial fibrillation    4. Coronary artery disease involving native coronary artery of native heart without angina pectoris    5. S/P CABG (coronary artery bypass graft)        Plan:     1. Chronic atrial fibrillation  - not taking metoprolol, maybe to try to decrease RV pacing burden  - will add back toprol at 25mg daily to help prevent AF with RVR  - c/w eliquis for OAC    2. HTN  - controlled, c/w home medications    3. R/o pacemaker induced CM  - TR is noted,  - gave pt address to [facility], asked to take a friend whenever appt is scheduled  - right now does not want to upgrade device inspite of know pacemaker induced CM, -  - he will consider drive to [facility] when he figures out directions with friend, if ok, then will re-order MUGA  - c/w rotuine device checks    4. CAD s/p CABG  - c/w asa/statin/BB/acei    Continue with current medical plan and lifestyle changes.  Return sooner for concerns or questions. If symptoms persist go to the ED  Total duration of face to face visit time 30 minutes.  Total time spent counseling greater than fifty percent of total visit time.  Counseling included discussion regarding imaging findings, diagnosis, possibilities, treatment options, risks and benefits.      No orders of the defined types were placed in this encounter.      Follow up as scheduled. Return to clinic in 4-6 months   He expressed verbal understanding and agreed with the plan    Thank you for the opportunity to care for this patient. Will be available for questions if  needed.     Jenaro Gonzalez MD [facility]  Interventional Cardiology  [facility]  Pager: [phone]

## 2020-02-17 NOTE — TELEPHONE ENCOUNTER
[employer] rep to order new remote monitor for patient.  Will be sent to patient's home address.  Spoke to patient and he will try to set up new monitor. If he should have any problems, he will let us know.

## 2020-03-30 NOTE — PROGRESS NOTES
ICD Evaluation Report    March 30, 2020    Primary MD: Dr. Spence  Primary Cardiologist: Dr. Melgar  : Biotronik Model: Ilesto 7 DR-T DF-1/Serial No.  [id number]  Implant date: 3/12/2015  Reason for evaluation:routine  Indication for ICD: Ischemic Cardiomyopathy    Measurements  Atrial sensing - P wave: 2.8 mV  Atrial capture: Maintained: n/a:  Afib   Atrial lead impedance: 477 ohms  Ventricular sensing - R wave: CHB  Ventricular capture: RV Maintained: 0.5 V @ 0.4 ms   Ventricular lead impedance: RV:  419 ohms;   Shock Impedance:  79 ohms  Underlying rhythm: Complete Heart Block      Diagnostic Data  Atrial paced: 0 % Ventricular paced: 98 %  Mode switch: 100% Afib  Other: n/a  Ventricular Events: none  Battery status: satisfactory 49% longevity     VT Zones and Therapies:  VT-1 zone:  171 bpm to 213 bpm   Therapies: monitor  VF zone:  > 214 bpm   Therapies: Burst then 40J x 8    Final Parameters  Mode: DDDR Lower rate: 60 bpm Upper rate: 130 bpm  AV Delay: 200/160 ms Mode Switch: On Rate Response: CLS  Atrial - Amplitude: 2.5 V Pulse width: 0.4 ms Sensitivity: 0.4 mV   Polarity: Bipolar  Ventricular - Amplitude: 2.0 V Pulse width: 0.4 ms Sensitivity: 0.8 mV   Polarity: Bipolar  Changes made: none  Conclusions: normal device function    Follow up: Evaluate whether upgrade to CRT-D device indicated due to RV pacing > 40%      Costa Ferreira

## 2020-05-21 NOTE — ED PROVIDER NOTES
"Encounter Date: 5/21/2020       History     Chief Complaint   Patient presents with    Fall     "I was walking under my carport and I tripped and fell then I went inside to clean the blood and my noisy neighbor came to see about me and called the ambulance" denies pain. AAOx4     The patient is an 85-year-old male.  He has a documented history of atrial fibrillation, coronary artery disease, hyperlipidemia, and hypertension.  He presents following a trip and fall that occurred at home just prior to arrival.  He struck his head and his right knee.  He denies loss of consciousness.  He denies headache, lightheadedness, dizziness, changes in vision, hearing loss, tinnitus, neck pain, and stiffness.  He takes Eliquis for venous thromboembolism prophylaxis.  He was able to stand without support following the fall.  His neighbor came over shortly thereafter and saw that he was bleeding and insisted that he come to the ED to be evaluated.  The patient did not want to come with his neighbor called to activated emergency services.  He does not know when he last received tetanus immunization.    The history is provided by the patient. No  was used.     Review of patient's allergies indicates:   Allergen Reactions    Xarelto [rivaroxaban] Anaphylaxis     Past Medical History:   Diagnosis Date    Atrial fibrillation     Coronary artery disease     Encounter for blood transfusion     Hyperlipidemia     Hypertension      Past Surgical History:   Procedure Laterality Date    CARDIAC CATHETERIZATION  2016    CORONARY ARTERY BYPASS GRAFT  2006    TOTAL KNEE ARTHROPLASTY Left 2010          No family history on file.  Social History     Tobacco Use    Smoking status: Never Smoker    Smokeless tobacco: Never Used   Substance Use Topics    Alcohol use: No    Drug use: No     Review of Systems   Constitutional: Negative for activity change, appetite change, chills, diaphoresis, fatigue and fever.   Eyes: " Negative for visual disturbance.   Respiratory: Negative for cough and shortness of breath.    Cardiovascular: Negative for chest pain and palpitations.   Gastrointestinal: Negative for abdominal pain, nausea and vomiting.   Genitourinary: Negative for dysuria.   Musculoskeletal: Negative for arthralgias, myalgias, neck pain and neck stiffness.   Skin:        + scalp laceration  + right lower leg abrasion   Neurological: Negative for syncope, weakness, light-headedness, numbness and headaches.   Hematological: Does not bruise/bleed easily.       Physical Exam     Initial Vitals [05/21/20 1010]   BP Pulse Resp Temp SpO2   (!) 88/54 70 16 99.2 °F (37.3 °C) 99 %      MAP       --         Physical Exam    Nursing note and vitals reviewed.  Constitutional: He appears well-developed and well-nourished. He is not diaphoretic. No distress.   HENT:   Right Ear: Tympanic membrane and ear canal normal. No hemotympanum.   Left Ear: Tympanic membrane and ear canal normal. No hemotympanum.   5 cm simple laceration to the left parietal scalp.  No tenderness.  No bleeding.   Eyes: Conjunctivae and EOM are normal. Pupils are equal, round, and reactive to light. No scleral icterus.   Neck: No spinous process tenderness and no muscular tenderness present. No neck rigidity.   Cardiovascular: Normal rate and regular rhythm. Exam reveals no gallop and no friction rub.    No murmur heard.  Pulmonary/Chest: Effort normal and breath sounds normal. No respiratory distress. He has no decreased breath sounds. He has no wheezes. He has no rhonchi. He has no rales.   Abdominal: Soft. He exhibits no distension. There is no tenderness.   Musculoskeletal: Normal range of motion. He exhibits no edema or tenderness.   Neurological: He is alert and oriented to person, place, and time. He has normal strength. No cranial nerve deficit. He displays a negative Romberg sign. Coordination and gait normal. GCS score is 15. GCS eye subscore is 4. GCS verbal  subscore is 5. GCS motor subscore is 6.   Skin: Skin is warm and dry. No pallor.              ED Course   Lac Repair  Date/Time: 5/21/2020 12:07 PM  Performed by: Drew Doll III, MD  Authorized by: Drew Doll III, MD   Consent Done: Not Needed  Body area: head/neck  Location details: scalp  Laceration length: 5 cm  Vascular damage: no  Anesthesia: local infiltration    Anesthesia:  Local Anesthetic: lidocaine 1% with epinephrine  Anesthetic total: 4 mL  Patient sedated: no  Preparation: Patient was prepped and draped in the usual sterile fashion.  Irrigation solution: saline  Irrigation method: syringe  Amount of cleaning: standard  Debridement: none  Degree of undermining: none  Skin closure: 4-0 Prolene  Number of sutures: 5  Technique: simple  Approximation: close  Approximation difficulty: simple  Patient tolerance: Patient tolerated the procedure well with no immediate complications        Labs Reviewed   CBC W/ AUTO DIFFERENTIAL - Abnormal; Notable for the following components:       Result Value    RBC 3.67 (*)     Hemoglobin 11.6 (*)     Hematocrit 35.9 (*)     Mean Corpuscular Hemoglobin 31.6 (*)     Platelets 117 (*)     Gran% 74.0 (*)     Lymph% 17.0 (*)     All other components within normal limits   COMPREHENSIVE METABOLIC PANEL - Abnormal; Notable for the following components:    Glucose 113 (*)     BUN, Bld 37 (*)     Creatinine 1.91 (*)     Total Bilirubin 2.5 (*)     eGFR if  36.1 (*)     eGFR if non  31.2 (*)     All other components within normal limits   PROTIME-INR - Abnormal; Notable for the following components:    Prothrombin Time 18.3 (*)     INR 1.6 (*)     All other components within normal limits   TSH   APTT          Imaging Results          CT Head Without Contrast (Final result)  Result time 05/21/20 11:03:48    Final result by Gael Watkins MD (05/21/20 11:03:48)                 Impression:      Left frontoparietal scalp soft tissue  injury/laceration.  No evidence of intracranial hemorrhage      Electronically signed by: Gael Watkins MD  Date:    05/21/2020  Time:    11:03             Narrative:    EXAMINATION:  CT HEAD WITHOUT CONTRAST    CLINICAL HISTORY:  Head trauma, intracranial venous inj suspected;    TECHNIQUE:  Low dose axial CT images obtained throughout the head without intravenous contrast. Sagittal and coronal reconstructions were performed.  All CT scans at this facility use dose modulation, iterative reconstruction and/or weight based dosing when appropriate to reduce radiation dose to as low as reasonably achievable.    COMPARISON:  None.    FINDINGS:  Intracranial compartment:    The brain parenchyma demonstrates areas of decreased attenuation with moderate periventricular white matter consistent with chronic microvascular ischemic changes..  No parenchymal mass, hemorrhage, edema or major vascular distribution infarct.  Vascular calcifications are noted.    Mild prominence of the sulci and ventricles are consistent with age-related involutional changes.    No extra-axial blood or fluid collections.    Skull/extracranial contents (limited evaluation): Left frontoparietal scalp soft tissue injury/laceration.  No fracture. Mastoid air cells and paranasal sinuses are essentially clear.                                 Medical Decision Making:   History:   Old Medical Records: I decided to obtain old medical records.  Old Records Summarized: other records.       <> Summary of Records: Reviewed past medical history, see HPI.  Clinical Tests:   Lab Tests: Ordered and Reviewed  Radiological Study: Ordered and Reviewed    Medical decision making:  This patient has undergone evaluation following a mechanical fall that occurred at home just prior to arrival in [facility] in head trauma.  He is on blood thinners.  He has a scalp laceration.  CT of the brain is negative for acute intracranial processes.  He is without headache,  lightheadedness, dizziness, changes in vision, nausea, vomiting, or other concerning symptoms.  Laceration was repaired with suture.  Tetanus immunization was administered.                                 Clinical Impression:       ICD-10-CM ICD-9-CM   1. Acute head injury, initial encounter S09.90XA 959.01   2. Fall W19.XXXA E888.9   3. Laceration of scalp, initial encounter S01.01XA 873.0   4. Abrasion of right lower extremity, initial encounter S80.811A 916.0         Disposition:   Disposition: Discharged  Condition: Stable                        Drew Doll III, MD  05/21/20 1212

## 2020-05-22 PROBLEM — M1A.9XX0 CHRONIC GOUT WITHOUT TOPHUS: Status: ACTIVE | Noted: 2020-01-01

## 2020-05-22 PROBLEM — I50.22 CHRONIC SYSTOLIC CONGESTIVE HEART FAILURE: Status: ACTIVE | Noted: 2020-01-01

## 2020-05-22 NOTE — PROGRESS NOTES
Subjective:       Patient ID: Danyel Rangel is a 85 y.o. male.    Chief Complaint: Head Injury (pt fell yesterday went to er, 7 stitches to head, ) and Urinary Retention (pt unable to pee x2 days, stomach  hard and distented. pt states he  has urge but unable to urinate completely )    HPI  Pt tripped and fell yesterday lacerating scalp.  ER records reviewed.  Dec in GFR noted.  No syncope, LOC.  Has had lots of bleeding.  Pt also reports difficulty urinating since yesterday, getting up x times last night to urinate but unable to void, able to urinate only a scant amt this AM.  C/O abd pain, distention.  No dysuria, F/C.  No CP, SOB, palpitations.  No gout flares.  Pt lives alone, performs his own ADLs but has family checking on him frequently.  Review of Systems   All other systems reviewed and are negative.      Objective:      Physical Exam   Constitutional: He appears well-developed.   HENT:   Head: Normocephalic.   4 cm lac L parietal scalp   Eyes: EOM are normal.   Neck: Normal range of motion.   Cardiovascular: Normal rate, normal heart sounds and intact distal pulses.   irreg irreg   Pulmonary/Chest: Effort normal and breath sounds normal.   Abdominal: Soft. Bowel sounds are normal. He exhibits distension. He exhibits no mass. There is tenderness (minimal RLQ). There is no guarding.   Musculoskeletal: Normal range of motion. He exhibits no edema.   Neurological: He is alert.   Skin: Skin is warm and dry.   Psychiatric: He has a normal mood and affect.   Vitals reviewed.      Assessment:       1. CKD (chronic kidney disease), stage III    2. Chronic atrial fibrillation    3. HTN (hypertension), benign    4. Benign prostatic hyperplasia with weak urinary stream    5. Hypothyroidism, unspecified type    6. Chronic gout without tophus, unspecified cause, unspecified site    7. Coronary artery disease involving native coronary artery of native heart without angina pectoris    8. Chronic systolic congestive heart  failure    9. Abnormal LFTs    10. Generalized abdominal pain    11. Urinary retention        Plan:       Danyel was seen today for head injury and urinary retention.    Diagnoses and all orders for this visit:    CKD (chronic kidney disease), stage III  -     Comprehensive metabolic panel; Future   D/C ACEI    Chronic atrial fibrillation  -     metoprolol succinate (TOPROL-XL) 50 MG 24 hr tablet; Take 1 tablet (50 mg total) by mouth once daily.    HTN (hypertension), benign  -     metoprolol succinate (TOPROL-XL) 50 MG 24 hr tablet; Take 1 tablet (50 mg total) by mouth once daily.    Benign prostatic hyperplasia with weak urinary stream  -     POCT urine dipstick without microscope    Hypothyroidism, unspecified type   Euthyroid    Chronic gout without tophus, unspecified cause, unspecified site  -     Uric acid; Future    Coronary artery disease involving native coronary artery of native heart without angina pectoris   Quiescent    Chronic systolic congestive heart failure  -     metoprolol succinate (TOPROL-XL) 50 MG 24 hr tablet; Take 1 tablet (50 mg total) by mouth once daily.    Abnormal LFTs  -     Comprehensive metabolic panel; Future    Urinary retention   Abd CT w/o    Follow up in about 10 days (around 6/1/2020).

## 2020-05-23 PROBLEM — K81.9 CHOLECYSTITIS WITHOUT CHOLELITHIASIS: Status: ACTIVE | Noted: 2020-01-01

## 2020-05-23 PROBLEM — K80.10 CHOLECYSTITIS WITH CHOLELITHIASIS: Status: ACTIVE | Noted: 2020-01-01

## 2020-05-23 NOTE — ED NOTES
Pt resting comfortably on stretcher. Reports pain is coming back. VSS. Awaiting bed placement. Attempted to call [facility] to update about admission status. No answer. MD notified.

## 2020-05-23 NOTE — ED PROVIDER NOTES
Chief Complaint  Chief Complaint   Patient presents with    Abdominal Pain     Pt with c/o R flank/abdominal pain that started today with associated SOB. LBM today.     Shortness of Breath       HPI  Danyel Rangel is a 85 y.o. male who presents with right upper quadrant pain.  Patient reports this right upper quadrant pain is severe and unrelenting today.  He had a CT scan yesterday ordered by his primary doctor which showed gallstones.  The patient denies any vomiting today.  He denies any diarrhea or trauma or suspicious food.  He initially reported shortness of breath but later clarified that when he takes a deep breath it hurts his right upper quadrant abdomen.  He denies any other breathing troubles or chest pain or palpitations or fever or coughing or other symptoms.  His pain is severe and exacerbated by touch of the right upper quadrant and relieved by nothing.    Past medical history  Past Medical History:   Diagnosis Date    Atrial fibrillation     Coronary artery disease     Encounter for blood transfusion     Hyperlipidemia     Hypertension        Current Medications    Current Facility-Administered Medications:     piperacillin-tazobactam 4.5 g in dextrose 5 % 100 mL IVPB (ready to mix system), 4.5 g, Intravenous, ED 1 Time, Kody Anton MD    sodium chloride 0.9% bolus 1,000 mL, 1,000 mL, Intravenous, ED 1 Time, Kody Anton MD    Current Outpatient Medications:     allopurinol (ZYLOPRIM) 300 MG tablet, TAKE 1 TABLET EVERY DAY, Disp: 90 tablet, Rfl: 4    apixaban (ELIQUIS) 5 mg Tab, Take 1 tablet (5 mg total) by mouth 2 (two) times daily., Disp: 180 tablet, Rfl: 3    finasteride (PROSCAR) 5 mg tablet, Take 1 tablet (5 mg total) by mouth once daily., Disp: 90 tablet, Rfl: 3    furosemide (LASIX) 20 MG tablet, Take 1 tablet (20 mg total) by mouth 2 (two) times daily., Disp: 180 tablet, Rfl: 3    levothyroxine (SYNTHROID) 50 MCG tablet, TAKE 1 TABLET EVERY DAY, Disp: 90  tablet, Rfl: 4    metoprolol succinate (TOPROL-XL) 50 MG 24 hr tablet, Take 1 tablet (50 mg total) by mouth once daily., Disp: 90 tablet, Rfl: 3    potassium chloride SA (K-DUR,KLOR-CON) 10 MEQ tablet, TAKE 1 TABLET EVERY DAY, Disp: 90 tablet, Rfl: 3    simvastatin (ZOCOR) 20 MG tablet, Take 1 tablet (20 mg total) by mouth every evening., Disp: 90 tablet, Rfl: 3    Allergies  Review of patient's allergies indicates:   Allergen Reactions    Xarelto [rivaroxaban] Anaphylaxis       Surgical history  Past Surgical History:   Procedure Laterality Date    CARDIAC CATHETERIZATION  2016    CORONARY ARTERY BYPASS GRAFT  2006    TOTAL KNEE ARTHROPLASTY Left 2010            Social history  Social History     Socioeconomic History    Marital status:      Spouse name: Not on file    Number of children: Not on file    Years of education: Not on file    Highest education level: Not on file   Occupational History    Not on file   Social Needs    Financial resource strain: Not on file    Food insecurity:     Worry: Not on file     Inability: Not on file    Transportation needs:     Medical: Not on file     Non-medical: Not on file   Tobacco Use    Smoking status: Never Smoker    Smokeless tobacco: Never Used   Substance and Sexual Activity    Alcohol use: No    Drug use: No    Sexual activity: Not on file   Lifestyle    Physical activity:     Days per week: Not on file     Minutes per session: Not on file    Stress: Not on file   Relationships    Social connections:     Talks on phone: Not on file     Gets together: Not on file     Attends Hinduism service: Not on file     Active member of club or organization: Not on file     Attends meetings of clubs or organizations: Not on file     Relationship status: Not on file   Other Topics Concern    Not on file   Social History Narrative    Not on file       Family History  History reviewed. No pertinent family history.    Review of  "systems  Constitutional: No fever or weakness.  Skin: No rash, abscess, or laceration.  Neurologic: No new focal weakness or sensory changes.  All systems otherwise negative except as noted in ROS and HPI    Physical Exam  Vital signs: /68 (BP Location: Right arm, Patient Position: Sitting)   Pulse 69   Temp 97.5 °F (36.4 °C) (Oral)   Resp 18   Ht 5' 8" (1.727 m)   Wt 76.2 kg (168 lb)   SpO2 97%   BMI 25.54 kg/m²   Constitutional: No acute distress.  Well developed, alert, oriented and appropriate.  HENT: Normocephalic, atraumatic. Normal ear, nose, and throat.  Eyes: PERRL, EOMI, normal conjunctiva.  Neck: Normal range of motion, no tenderness; supple.  Respiratory: Nonlabored breathing with normal breath sounds.  Cardiovascular: RRR with no pulse deficit.  GI:  Very tender to the right upper quadrant.  Positive Lin's; positive ultrasonographic Lin's by my bedside exam  Musculoskeletal: Normal ROM, no tenderness, injury, or edema.  Skin: Warm, dry skin without infection or injury.  Neurologic: Normal motor, sensation with no new focal deficit.  Psychiatric: Affect normal, judgement normal, mood normal.  No SI, HI, and not gravely disabled.    Labs  Pertinent labs reviewed (see chart for details)  Labs Reviewed   CBC W/ AUTO DIFFERENTIAL - Abnormal; Notable for the following components:       Result Value    WBC 20.47 (*)     RBC 3.78 (*)     Hemoglobin 11.8 (*)     Hematocrit 37.6 (*)     Mean Corpuscular Volume 100 (*)     Mean Corpuscular Hemoglobin 31.2 (*)     Mean Corpuscular Hemoglobin Conc 31.4 (*)     RDW 14.6 (*)     Platelets 132 (*)     Immature Granulocytes 0.7 (*)     Gran # (ANC) 17.0 (*)     Immature Grans (Abs) 0.15 (*)     Mono # 1.8 (*)     Gran% 83.2 (*)     Lymph% 7.4 (*)     All other components within normal limits   COMPREHENSIVE METABOLIC PANEL - Abnormal; Notable for the following components:    BUN, Bld 51 (*)     Creatinine 2.37 (*)     Total Bilirubin 4.1 (*)     eGFR " if  27.8 (*)     eGFR if non  24.1 (*)     All other components within normal limits   CULTURE, BLOOD   CULTURE, BLOOD   LIPASE   URINALYSIS, REFLEX TO URINE CULTURE   SARS-COV-2 RNA AMPLIFICATION, QUAL       ECG  Results for orders placed or performed in visit on 02/11/20   EKG 12-lead    Collection Time: 02/11/20  7:55 AM    Narrative    Test Reason : R07.9    Vent. Rate : 070 BPM     Atrial Rate : 250 BPM     P-R Int : 000 ms          QRS Dur : 182 ms      QT Int : 492 ms       P-R-T Axes : 000 -84 053 degrees     QTc Int : 531 ms    Ventricular-paced rhythm  Abnormal ECG  When compared with ECG of 10-OCT-2016 13:57,  No significant change was found  Confirmed by Vishal CHA MD, Chilo DANIELLE ([id number]) on 2/17/2020 12:03:05 PM    Referred By:  DEMETRIO           Confirmed By:Chilo Rodgers III, MD     ECG interpreted by ED MD    Radiology  No orders to display       Procedures  Procedures    Medications   piperacillin-tazobactam 4.5 g in dextrose 5 % 100 mL IVPB (ready to mix system) (has no administration in time range)   sodium chloride 0.9% bolus 1,000 mL (has no administration in time range)   morphine injection 4 mg (4 mg Intravenous Given 5/23/20 1622)   ondansetron injection 8 mg (8 mg Intravenous Given 5/23/20 1622)       ED course and medical decision making    ED Course as of May 23 1654   Sat May 23, 2020   1623 EKG shows paced rhythm at 70 beats per minute    [MB]   1653 D/w Dr. Corral and he will admit with consult to GI for ERCP, then likely cholecystectomy    [MB]      ED Course User Index  [MB] Kody Anton MD       Discussed the case with General surgery and they agree with decision to admit and consult GI for ERCP and then likely cholecystectomy following stone removal    Disposition    Patient transferred in stable condition      Final impression  1. Cholecystitis without cholelithiasis    2. SOB (shortness of breath)        Critical care time spent with this  patient was 30 minutes excluding the procedure time.          Kody Anton MD  05/23/20 7109

## 2020-05-23 NOTE — ED NOTES
C/o RUQ pain. States it is hard to breathe because it makes his stomach hurt worse. Pt had CT scan done outpatient yesterday per PCP and was dx with gallstones. VSS. No fever.

## 2020-05-24 PROBLEM — E87.5 HYPERKALEMIA: Status: ACTIVE | Noted: 2020-01-01

## 2020-05-24 PROBLEM — R10.11 RUQ PAIN: Status: ACTIVE | Noted: 2020-01-01

## 2020-05-24 PROBLEM — D72.829 LEUKOCYTOSIS: Status: ACTIVE | Noted: 2020-01-01

## 2020-05-24 PROBLEM — N18.4 CHRONIC KIDNEY DISEASE, STAGE IV (SEVERE): Status: ACTIVE | Noted: 2020-01-01

## 2020-05-24 PROBLEM — N17.9 AKI (ACUTE KIDNEY INJURY): Status: ACTIVE | Noted: 2020-01-01

## 2020-05-24 PROBLEM — R93.3 ABNORMAL FINDING ON GI TRACT IMAGING: Status: ACTIVE | Noted: 2020-01-01

## 2020-05-24 NOTE — ANESTHESIA PREPROCEDURE EVALUATION
05/24/2020     Danyel Rangel is a 85 y.o., male PMH of Afib, CAD, HTN, and HLD, pacemaker here for ERCP    Past Medical History:   Diagnosis Date    Atrial fibrillation     Coronary artery disease     Encounter for blood transfusion     Hyperlipidemia     Hypertension      Past Surgical History:   Procedure Laterality Date    CARDIAC CATHETERIZATION  2016    CORONARY ARTERY BYPASS GRAFT  2006    TOTAL KNEE ARTHROPLASTY Left 2010          Dual chamber ICD [employer] 7DRT  Serial # [id number]  Indication for placement:  Atrial fibrillation  Last interrogated:  4/2016 at outside facility (14% atrial pacing with 80% a-fib)  Per [employer] tech support:  Place magnet over device for the duration of the procedure for ORIF humerus    Past Surgical History:   Procedure Laterality Date    CARDIAC CATHETERIZATION  2016    CORONARY ARTERY BYPASS GRAFT  2006    TOTAL KNEE ARTHROPLASTY Left 2010            Anesthesia Evaluation    I have reviewed the Patient Summary Reports.    I have reviewed the Nursing Notes.   I have reviewed the Medications.     Review of Systems  Anesthesia Hx:  No problems with previous Anesthesia  History of prior surgery of interest to airway management or planning: heart surgery. Previous anesthesia: General  Denies Personal Hx of Anesthesia complications.   Social:  Non-Smoker, No Alcohol Use    EENT/Dental:EENT/Dental Normal   Cardiovascular:   Exercise tolerance: good Pacemaker (placed initially in 2010 atrial fibrillation with upgrade to ICD 3/2015; right chest) Hypertension, well controlled Past MI (2010) CAD  CABG/stent (2010 and 2016) Dysrhythmias (rate controlled with apixaban) atrial fibrillation  Denies Angina.     hyperlipidemia     ECG has been reviewed.    Pulmonary:   Denies Shortness of breath.    Renal/:  Renal/ Normal     Hepatic/GI:   GERD, well controlled     Musculoskeletal:   Arthritis     Neurological:  Neurology Normal    Endocrine:   Hypothyroidism        Physical Exam  General:  Well nourished    Airway/Jaw/Neck:  Airway Findings: Mouth Opening: Normal Tongue: Normal  General Airway Assessment: Adult  Mallampati: II  TM Distance: Normal, at least 6 cm        Eyes/Ears/Nose:  EYES/EARS/NOSE FINDINGS: Normal   Dental:  Dental Findings: Periodontal disease, Severe   Chest/Lungs:  Chest/Lungs Clear    Heart/Vascular:  Heart Findings: Normal Heart murmur: negative Other Heart Findings: Dual chamber ICD right chest     Abdomen:  Abdomen Findings: Normal      Mental Status:  Mental Status Findings: Normal        · Mildly decreased left ventricular systolic function. The estimated ejection fraction is 45%  · Diastolic pattern consistent with atrial fibrillation observed.  · Normal right ventricular systolic function.  · Severe left atrial enlargement.  · Moderate aortic regurgitation.  · Mild aortic valve stenosis.  · Aortic valve area is 1.51 cm2; peak velocity is 1.44 m/s; mean gradient is 5 mmHg.  · Severe tricuspid regurgitation.    EKG:  Ventricular-paced rhythm  Abnormal ECG  When compared with ECG of 10-OCT-2016 13:57,  No significant change was found  Confirmed by Vishal CHA MD, Chilo DANIELLE ([id number]) on 2/17/2020 12:03:05 PM        Anesthesia Plan  Type of Anesthesia, risks & benefits discussed:  Anesthesia Type:  general  Patient's Preference:   Intra-op Monitoring Plan: standard ASA monitors  Intra-op Monitoring Plan Comments:   Post Op Pain Control Plan: per primary service following discharge from PACU  Post Op Pain Control Plan Comments:   Induction:   IV  Beta Blocker:  Patient is on a Beta-Blocker and has received one dose within the past 24 hours (No further documentation required).       Informed Consent: Patient understands risks and agrees with Anesthesia plan.  Questions answered. Anesthesia consent signed with patient.  ASA Score: 3     Day of Surgery  Review of History & Physical: I have interviewed and examined the patient. I have reviewed the patient's H&P dated:  There are no significant changes.  H&P update referred to the surgeon.     Anesthesia Plan Notes: Discussed case with Dr. Cox. Increased risk of cardiopulmonary compromise due to wheezing on exam, hyperkalemia, possible sepsis. Will shift potassium before procedure and give albuterol.      Dual chamber ICD [employer] 7DRT  Serial # [id number]  Indication for placement:  Atrial fibrillation  Last interrogated:  4/2016 at outside facility (14% atrial pacing with 80% a-fib)  Now followed by Dr. JOSIE Melgar with next interrogation 2/2017  Per [employer] tech support:  Place magnet over device for the duration of the procedure.        Ready For Surgery From Anesthesia Perspective.

## 2020-05-24 NOTE — CONSULTS
[facility]  Gastroenterology  Consult Note    Patient Name: Danyel Rangel  MRN: [id number]  Admission Date: 5/23/2020  Hospital Length of Stay: 0 days  Code Status: Full Code   Attending Provider: Nelida Mcdaniel*   Consulting Provider: Millicent Mathis MD  Primary Care Physician: Pa Spence MD  Principal Problem:Cholecystitis without cholelithiasis    Inpatient consult to [facility]ology[facility]  Consult performed by: Millicent Mathis MD  Consult ordered by: Jadiel Corral MD  Reason for consult: RUQ pain, choledocholithiasis  Assessment/Recommendations: Please see A/P below        Subjective:     HPI:  86 yo M complains of RUQ pain.  The pt has had RUQ radiating up into his right chest for the past few days.  When he presented to the ED yesterday, it was very severe.  He states that it has improved slightly but he is still in extreme pain.  He also has RUQ pain with deep inspiration.  He denies n/v, constipation or diarrhea, or bleeding.  He states he continues to feel somewhat SOB.    Last dose of Eliquis was Sat am.    Past Medical History:   Diagnosis Date    Atrial fibrillation     Coronary artery disease     Encounter for blood transfusion     Hyperlipidemia     Hypertension        Past Surgical History:   Procedure Laterality Date    CARDIAC CATHETERIZATION  2016    CORONARY ARTERY BYPASS GRAFT  2006    TOTAL KNEE ARTHROPLASTY Left 2010            Review of patient's allergies indicates:   Allergen Reactions    Xarelto [rivaroxaban] Anaphylaxis     Family History     None        Tobacco Use    Smoking status: Never Smoker    Smokeless tobacco: Never Used   Substance and Sexual Activity    Alcohol use: No    Drug use: No    Sexual activity: Not on file     Review of Systems   Constitutional: Positive for appetite change and fatigue.   Eyes: Negative for photophobia and visual disturbance.   Respiratory: Positive for shortness of breath. Negative for chest  tightness and wheezing.    Cardiovascular: Positive for chest pain. Negative for palpitations and leg swelling.   Gastrointestinal: Positive for abdominal distention and abdominal pain.   Genitourinary: Negative for dysuria, flank pain and hematuria.   Musculoskeletal: Negative for joint swelling and myalgias.   Skin: Negative for color change and rash.   Neurological: Negative for dizziness and speech difficulty.   Psychiatric/Behavioral: Negative for confusion and hallucinations.     Objective:     Vital Signs (Most Recent):  Temp: 98.5 °F (36.9 °C) (05/24/20 1333)  Pulse: 69 (05/24/20 1333)  Resp: 17 (05/24/20 1333)  BP: (!) 87/63 (05/24/20 1333)  SpO2: 96 % (05/24/20 0738) Vital Signs (24h Range):  Temp:  [97.5 °F (36.4 °C)-98.5 °F (36.9 °C)] 98.5 °F (36.9 °C)  Pulse:  [69-81] 69  Resp:  [15-20] 17  SpO2:  [96 %-97 %] 96 %  BP: ()/() 87/63     Weight: 79.8 kg (175 lb 14.8 oz) (05/24/20 0537)  Body mass index is 26.75 kg/m².      Intake/Output Summary (Last 24 hours) at 5/24/2020 1336  Last data filed at 5/24/2020 1045  Gross per 24 hour   Intake 1200 ml   Output 50 ml   Net 1150 ml       Lines/Drains/Airways     Peripheral Intravenous Line                 Peripheral IV - Single Lumen 05/23/20 1614 20 G Left Antecubital less than 1 day                Physical Exam   Constitutional: He is oriented to person, place, and time. He appears well-developed and well-nourished.   Ill appearing   HENT:   Head: Normocephalic and atraumatic.   Eyes: Pupils are equal, round, and reactive to light. EOM are normal.   Neck: Normal range of motion. Neck supple.   Cardiovascular: Normal rate and regular rhythm.   Pulmonary/Chest:   tachypneic with frequent shallow breaths, speaks in 2-3 word sentences   Abdominal:   Distended abdomen, worse in upper quadrants.  He is TTP in all quadrants, bowel sounds are hypoactive, + tympanic in upper quadrants   Musculoskeletal: He exhibits no edema or deformity.   Neurological: He  is alert and oriented to person, place, and time.   Skin: Skin is warm and dry.   Psychiatric: He has a normal mood and affect. His behavior is normal.       Significant Labs:  Blood Culture:   Recent Labs   Lab 05/23/20  1650 05/23/20  1700   LABBLOO No Growth to date Gram stain vinh bottle: Gram positive cocci in chains resembling Strep   Results called to and read back by: Brenna Will RN 05/24/2020  10:57     CBC:   Recent Labs   Lab 05/23/20  1618 05/24/20  0529   WBC 20.47* 20.78*   HGB 11.8* 11.5*   HCT 37.6* 37.1*   * 139*     CMP:   Recent Labs   Lab 05/24/20  0529   *   CALCIUM 8.6*   ALBUMIN 3.0*   PROT 6.7      K 5.4*   CO2 25      BUN 54*   CREATININE 2.7*   ALKPHOS 62   ALT 20   AST 32   BILITOT 4.1*     Lipase: 215.    Significant Imaging:  Imaging results within the past 24 hours have been reviewed. CT with numerous stones in gallbladder, also with stones in CBD.  Mild fat stranding at pancreatic head, no dilated bowel loops    Assessment/Plan:     RUQ pain  - I am concerned that he is developing pancreatitis from the gallstones.  I am repeating lipase.  If elevated, he will need to be transferred to ICU and given aggressive IVF.    Abnormal finding on GI tract imaging  - Stones in CBD on imaging.  I will schedule for ERCP tomorrow, but as he may have developing pancreatitis I am contacting [facility] (ERCP) physician on call to make sure this does not need to be done today.  I defer to his opinion on this.  - F/u repeat lipase and treat as pancreatitis if this has increased  - Cont abx, NPO        Thank you for your consult. I will follow-up with patient. Please contact us if you have any additional questions.    Millicent Mathis MD  Gastroenterology  [facility]

## 2020-05-24 NOTE — NURSING
VIRTUAL NURSE:  Cued into patient's room.  Permission received per patient to turn camera to view patient.  Introduced as VN for night shift that will be working with floor nurse and nursing assistant.  Educated patient on VN's role in patient care. Plan of care reviewed with patient. Education per flowsheet.  Opportunity given for questions and questions answered.  Admission assessment questions answered.  Instructed to call for assistance.  Will cont to monitor.    Labs, notes, and orders reviewed.

## 2020-05-24 NOTE — PROGRESS NOTES
Pt began having shortness of breath. Lung sounds diminished. Respiratory called and pt put on 2L O2 via NC. BP was 90/50 and HR 78. Pt asymptomatic. Pt had not voided all night. Bladder scanned with 140ml found. Dr Corral notified. Lasix 20mg IV, CXR stat, EKG stat, stat medical consult and IV saline locked ordered by MD. Pt resting. Will continue to monitor.

## 2020-05-24 NOTE — ASSESSMENT & PLAN NOTE
CKD (chronic kidney disease), stage III  Hyperkalemia  Avoid nephrotoxic medication  Strict input/ output  Renally dose of medication  Gentle rehydration  Treat hyperk

## 2020-05-24 NOTE — SUBJECTIVE & OBJECTIVE
Past Medical History:   Diagnosis Date    Atrial fibrillation     Coronary artery disease     Encounter for blood transfusion     Hyperlipidemia     Hypertension        Past Surgical History:   Procedure Laterality Date    CARDIAC CATHETERIZATION  2016    CORONARY ARTERY BYPASS GRAFT  2006    TOTAL KNEE ARTHROPLASTY Left 2010            Review of patient's allergies indicates:   Allergen Reactions    Xarelto [rivaroxaban] Anaphylaxis     Family History     None        Tobacco Use    Smoking status: Never Smoker    Smokeless tobacco: Never Used   Substance and Sexual Activity    Alcohol use: No    Drug use: No    Sexual activity: Not on file     Review of Systems   Constitutional: Positive for appetite change and fatigue.   Eyes: Negative for photophobia and visual disturbance.   Respiratory: Positive for shortness of breath. Negative for chest tightness and wheezing.    Cardiovascular: Positive for chest pain. Negative for palpitations and leg swelling.   Gastrointestinal: Positive for abdominal distention and abdominal pain.   Genitourinary: Negative for dysuria, flank pain and hematuria.   Musculoskeletal: Negative for joint swelling and myalgias.   Skin: Negative for color change and rash.   Neurological: Negative for dizziness and speech difficulty.   Psychiatric/Behavioral: Negative for confusion and hallucinations.     Objective:     Vital Signs (Most Recent):  Temp: 98.5 °F (36.9 °C) (05/24/20 1333)  Pulse: 69 (05/24/20 1333)  Resp: 17 (05/24/20 1333)  BP: (!) 87/63 (05/24/20 1333)  SpO2: 96 % (05/24/20 0738) Vital Signs (24h Range):  Temp:  [97.5 °F (36.4 °C)-98.5 °F (36.9 °C)] 98.5 °F (36.9 °C)  Pulse:  [69-81] 69  Resp:  [15-20] 17  SpO2:  [96 %-97 %] 96 %  BP: ()/() 87/63     Weight: 79.8 kg (175 lb 14.8 oz) (05/24/20 0537)  Body mass index is 26.75 kg/m².      Intake/Output Summary (Last 24 hours) at 5/24/2020 1336  Last data filed at 5/24/2020 1045  Gross per 24 hour   Intake  1200 ml   Output 50 ml   Net 1150 ml       Lines/Drains/Airways     Peripheral Intravenous Line                 Peripheral IV - Single Lumen 05/23/20 1614 20 G Left Antecubital less than 1 day                Physical Exam   Constitutional: He is oriented to person, place, and time. He appears well-developed and well-nourished.   Ill appearing   HENT:   Head: Normocephalic and atraumatic.   Eyes: Pupils are equal, round, and reactive to light. EOM are normal.   Neck: Normal range of motion. Neck supple.   Cardiovascular: Normal rate and regular rhythm.   Pulmonary/Chest:   tachypneic with frequent shallow breaths, speaks in 2-3 word sentences   Abdominal:   Distended abdomen, worse in upper quadrants.  He is TTP in all quadrants, bowel sounds are hypoactive, + tympanic in upper quadrants   Musculoskeletal: He exhibits no edema or deformity.   Neurological: He is alert and oriented to person, place, and time.   Skin: Skin is warm and dry.   Psychiatric: He has a normal mood and affect. His behavior is normal.       Significant Labs:  Blood Culture:   Recent Labs   Lab 05/23/20  1650 05/23/20  1700   LABBLOO No Growth to date Gram stain vinh bottle: Gram positive cocci in chains resembling Strep   Results called to and read back by: Brenna Will RN 05/24/2020  10:57     CBC:   Recent Labs   Lab 05/23/20  1618 05/24/20  0529   WBC 20.47* 20.78*   HGB 11.8* 11.5*   HCT 37.6* 37.1*   * 139*     CMP:   Recent Labs   Lab 05/24/20  0529   *   CALCIUM 8.6*   ALBUMIN 3.0*   PROT 6.7      K 5.4*   CO2 25      BUN 54*   CREATININE 2.7*   ALKPHOS 62   ALT 20   AST 32   BILITOT 4.1*     Lipase: No results for input(s): LIPASE in the last 48 hours.    Significant Imaging:  Imaging results within the past 24 hours have been reviewed. CT with numerous stones in gallbladder, also with stones in CBD.  Mild fat stranding at pancreatic head, no dilated bowel loops

## 2020-05-24 NOTE — CONSULTS
[facility] Hospital Medicine  Consult Note    Patient Name: Danyel Rangel  MRN: [id number]  Admission Date: 5/23/2020  Hospital Length of Stay: 0 days  Attending Physician: Nelida Mcdaniel MD  Primary Care Provider: Pa Spence MD           Patient information was obtained from patient and ER records.     Pharmacy to dose Vancomycin consult  Consult performed by: Nelida Mcdaniel MD  Consult ordered by: Nelida Mcdaniel MD        Subjective:     Principal Problem: Cholecystitis without cholelithiasis    Chief Complaint:   Chief Complaint   Patient presents with    Abdominal Pain     Pt with c/o R flank/abdominal pain that started today with associated SOB. LBM today.     Shortness of Breath        HPI: Danyel Rangel is an 84 y/o M with PMH of Afib, CAD, HTN, and HLD admitted by surgery with cholecystitis without cholelithiasis and SOB. Noted RUQ pain with deep breath denies nausea, chills fever or vomiting. Hospital medicine has been consulted for medical management.    Past Medical History:   Diagnosis Date    Atrial fibrillation     Coronary artery disease     Encounter for blood transfusion     Hyperlipidemia     Hypertension        Past Surgical History:   Procedure Laterality Date    CARDIAC CATHETERIZATION  2016    CORONARY ARTERY BYPASS GRAFT  2006    TOTAL KNEE ARTHROPLASTY Left 2010            Review of patient's allergies indicates:   Allergen Reactions    Xarelto [rivaroxaban] Anaphylaxis       No current facility-administered medications on file prior to encounter.      Current Outpatient Medications on File Prior to Encounter   Medication Sig    allopurinol (ZYLOPRIM) 300 MG tablet TAKE 1 TABLET EVERY DAY    apixaban (ELIQUIS) 5 mg Tab Take 1 tablet (5 mg total) by mouth 2 (two) times daily.    finasteride (PROSCAR) 5 mg tablet Take 1 tablet (5 mg total) by mouth once daily.    furosemide (LASIX) 20 MG tablet Take 1 tablet (20 mg total) by  mouth 2 (two) times daily.    levothyroxine (SYNTHROID) 50 MCG tablet TAKE 1 TABLET EVERY DAY    metoprolol succinate (TOPROL-XL) 50 MG 24 hr tablet Take 1 tablet (50 mg total) by mouth once daily.    potassium chloride SA (K-DUR,KLOR-CON) 10 MEQ tablet TAKE 1 TABLET EVERY DAY    simvastatin (ZOCOR) 20 MG tablet Take 1 tablet (20 mg total) by mouth every evening.     Family History     None        Tobacco Use    Smoking status: Never Smoker    Smokeless tobacco: Never Used   Substance and Sexual Activity    Alcohol use: No    Drug use: No    Sexual activity: Not on file     Review of Systems   Constitutional: Negative for chills and fever.   HENT: Negative for congestion and rhinorrhea.    Eyes: Negative for photophobia and discharge.   Respiratory: Negative for chest tightness and shortness of breath.    Cardiovascular: Negative for chest pain.   Gastrointestinal: Positive for abdominal pain. Negative for abdominal distention, blood in stool, diarrhea, nausea, rectal pain and vomiting.   Endocrine: Negative for polyphagia and polyuria.   Genitourinary: Negative for difficulty urinating and frequency.   Musculoskeletal: Negative for arthralgias and myalgias.   Skin: Negative for rash.   Neurological: Negative for weakness.   Psychiatric/Behavioral: Negative for agitation.     Objective:     Vital Signs (Most Recent):  Temp: 97.7 °F (36.5 °C) (05/24/20 0809)  Pulse: 69 (05/24/20 1103)  Resp: 17 (05/24/20 0809)  BP: (!) 96/56 (05/24/20 1103)  SpO2: 96 % (05/24/20 0738) Vital Signs (24h Range):  Temp:  [97.5 °F (36.4 °C)-98.1 °F (36.7 °C)] 97.7 °F (36.5 °C)  Pulse:  [69-81] 69  Resp:  [15-20] 17  SpO2:  [96 %-97 %] 96 %  BP: ()/() 96/56     Weight: 79.8 kg (175 lb 14.8 oz)  Body mass index is 26.75 kg/m².    Physical Exam   Constitutional: He is oriented to person, place, and time. He appears well-developed and well-nourished.   HENT:   Head: Normocephalic and atraumatic.   Neck: Neck supple.    Cardiovascular: Normal rate, regular rhythm and normal heart sounds. Exam reveals no gallop and no friction rub.   No murmur heard.  Pulmonary/Chest: Effort normal and breath sounds normal. He exhibits no tenderness.   Abdominal: Soft. He exhibits no distension. There is tenderness in the right upper quadrant. There is positive Lin's sign.   Neurological: He is alert and oriented to person, place, and time.       Significant Labs:   Bilirubin:   Recent Labs   Lab 05/21/20  1056 05/23/20  1618 05/24/20  0529   BILITOT 2.5* 4.1* 4.1*     Blood Culture:   Recent Labs   Lab 05/23/20  1650 05/23/20  1700   LABBLOO No Growth to date Gram stain vinh bottle: Gram positive cocci in chains resembling Strep   Results called to and read back by: Brenna Will RN 05/24/2020  10:57     CBC:   Recent Labs   Lab 05/23/20  1618 05/24/20  0529   WBC 20.47* 20.78*   HGB 11.8* 11.5*   HCT 37.6* 37.1*   * 139*     CMP:   Recent Labs   Lab 05/23/20  1618 05/24/20  0529    138   K 4.1 5.4*    106   CO2 25 25   GLU 84 183*   BUN 51* 54*   CREATININE 2.37* 2.7*   CALCIUM 8.9 8.6*   PROT 7.4 6.7   ALBUMIN 4.1 3.0*   BILITOT 4.1* 4.1*   ALKPHOS 58 62   AST 43 32   ALT 21 20   ANIONGAP 9 7*   EGFRNONAA 24.1* 21*     Lactic Acid: No results for input(s): LACTATE in the last 48 hours.  Lipid Panel: No results for input(s): CHOL, HDL, LDLCALC, TRIG, CHOLHDL in the last 48 hours.    Significant Imaging: I have reviewed all pertinent imaging results/findings within the past 24 hours.    Assessment/Plan:     * Cholecystitis without cholelithiasis  Leukocytosis  Surgery on board  GI consult      JOI (acute kidney injury)  CKD (chronic kidney disease), stage III  Hyperkalemia  Avoid nephrotoxic medication  Strict input/ output  Renally dose of medication  Gentle rehydration  Treat hyperk      Chronic systolic congestive heart failure  AICD (automatic cardioverter/defibrillator) present  S/P CABG (coronary artery bypass  graft)  Coronary artery disease involving native coronary artery of native heart without angina pectoris  Continue statin  Hold Lasix  Monitor  Hold K          Benign prostatic hyperplasia with weak urinary stream    Continue Proscar    Hypothyroidism  Continue Synthroid      HTN (hypertension), benign  Hyperlipidemia  Continues statin  Continue Metoprolol,   Hold Lasix        Chronic atrial fibrillation  Continue metoprolol and Eliquis        VTE Risk Mitigation (From admission, onward)    None              Thank you for your consult. I will follow-up with patient. Please contact us if you have any additional questions.    Nelida Mcdaniel MD  Department of Hospital Medicine   [facility]

## 2020-05-24 NOTE — PROGRESS NOTES
Pharmacokinetic Initial Assessment: IV Vancomycin    Assessment/Plan:    Initiate intravenous vancomycin with loading dose of 2000 mg once with subsequent doses when random concentrations are less than 15 mcg/mL  Desired empiric serum trough concentration is 10 to 15 mcg/mL  Draw vancomycin random level on 5-25-20 at 1200.  Pharmacy will continue to follow and monitor vancomycin.      Please contact pharmacy at extension [phone] with any questions regarding this assessment.     Thank you for the consult,   Tito Foreman       Patient brief summary:  Danyel Rangel is a 85 y.o. male initiated on antimicrobial therapy with IV Vancomycin for treatment of suspected bacteremia    Drug Allergies:   Review of patient's allergies indicates:   Allergen Reactions    Xarelto [rivaroxaban] Anaphylaxis       Actual Body Weight:   79.8 kg    Renal Function:   Estimated Creatinine Clearance: 19.4 mL/min (A) (based on SCr of 2.7 mg/dL (H)).,     Dialysis Method (if applicable):  N/A    CBC (last 72 hours):  Recent Labs   Lab Result Units 05/23/20  1618 05/24/20  0529   WBC K/uL 20.47* 20.78*   Hemoglobin g/dL 11.8* 11.5*   Hematocrit % 37.6* 37.1*   Platelets K/uL 132* 139*   Gran% % 83.2* 91.0*   Lymph% % 7.4* 4.0*   Mono% % 8.6 1.0*   Eosinophil% % 0.0 0.0   Basophil% % 0.1 0.0   Differential Method  Automated Manual       Metabolic Panel (last 72 hours):  Recent Labs   Lab Result Units 05/22/20  0928 05/23/20  1618 05/24/20  0529 05/24/20  1026   Sodium mmol/L  --  140 138  --    Potassium mmol/L  --  4.1 5.4*  --    Chloride mmol/L  --  106 106  --    CO2 mmol/L  --  25 25  --    Glucose mg/dL  --  84 183*  --    Glucose, UA  -  --   --  Negative   BUN, Bld mg/dL  --  51* 54*  --    Creatinine mg/dL  --  2.37* 2.7*  --    Albumin g/dL  --  4.1 3.0*  --    Total Bilirubin mg/dL  --  4.1* 4.1*  --    Alkaline Phosphatase U/L  --  58 62  --    AST U/L  --  43 32  --    ALT U/L  --  21 20  --        Drug levels (last 3 results):  No  results for input(s): VANCOMYCINRA, VANCOMYCINPE, VANCOMYCINTR in the last 72 hours.    Microbiologic Results:  Microbiology Results (last 7 days)       Procedure Component Value Units Date/Time    Blood culture [[id number]] Collected:  05/23/20 1700    Order Status:  Completed Specimen:  Blood from Peripheral, Antecubital, Right Updated:  05/24/20 1057     Blood Culture, Routine Gram stain vinh bottle: Gram positive cocci in chains resembling Strep       Results called to and read back by: Brenna Will RN 05/24/2020  10:57    Blood culture [[id number]] Collected:  05/23/20 1650    Order Status:  Completed Specimen:  Blood from Peripheral, Antecubital, Left Updated:  05/24/20 0315     Blood Culture, Routine No Growth to date

## 2020-05-24 NOTE — CONSULTS
NEPHROLOGY CONSULT NOTE    HPI & INTERVAL HISTORY:    Past Medical History:   Diagnosis Date    Atrial fibrillation     Coronary artery disease     Encounter for blood transfusion     Hyperlipidemia     Hypertension       Past Surgical History:   Procedure Laterality Date    CARDIAC CATHETERIZATION  2016    CORONARY ARTERY BYPASS GRAFT  2006    TOTAL KNEE ARTHROPLASTY Left 2010           Review of patient's allergies indicates:   Allergen Reactions    Xarelto [rivaroxaban] Anaphylaxis      Medications Prior to Admission   Medication Sig Dispense Refill Last Dose    allopurinol (ZYLOPRIM) 300 MG tablet TAKE 1 TABLET EVERY DAY 90 tablet 4 5/23/2020 at Unknown time    apixaban (ELIQUIS) 5 mg Tab Take 1 tablet (5 mg total) by mouth 2 (two) times daily. 180 tablet 3 5/23/2020 at Unknown time    finasteride (PROSCAR) 5 mg tablet Take 1 tablet (5 mg total) by mouth once daily. 90 tablet 3 5/23/2020 at Unknown time    furosemide (LASIX) 20 MG tablet Take 1 tablet (20 mg total) by mouth 2 (two) times daily. 180 tablet 3 5/23/2020 at Unknown time    levothyroxine (SYNTHROID) 50 MCG tablet TAKE 1 TABLET EVERY DAY 90 tablet 4 5/23/2020 at Unknown time    metoprolol succinate (TOPROL-XL) 50 MG 24 hr tablet Take 1 tablet (50 mg total) by mouth once daily. 90 tablet 3 5/23/2020 at Unknown time    potassium chloride SA (K-DUR,KLOR-CON) 10 MEQ tablet TAKE 1 TABLET EVERY DAY 90 tablet 3 5/23/2020 at Unknown time    simvastatin (ZOCOR) 20 MG tablet Take 1 tablet (20 mg total) by mouth every evening. 90 tablet 3 5/23/2020 at Unknown time       Social History     Socioeconomic History    Marital status:      Spouse name: Not on file    Number of children: Not on file    Years of education: Not on file    Highest education level: Not on file   Occupational History    Not on file   Social Needs    Financial resource strain: Not on file    Food insecurity:     Worry: Not on file     Inability: Not on file     Transportation needs:     Medical: Not on file     Non-medical: Not on file   Tobacco Use    Smoking status: Never Smoker    Smokeless tobacco: Never Used   Substance and Sexual Activity    Alcohol use: No    Drug use: No    Sexual activity: Not on file   Lifestyle    Physical activity:     Days per week: Not on file     Minutes per session: Not on file    Stress: Not on file   Relationships    Social connections:     Talks on phone: Not on file     Gets together: Not on file     Attends Christianity service: Not on file     Active member of club or organization: Not on file     Attends meetings of clubs or organizations: Not on file     Relationship status: Not on file   Other Topics Concern    Not on file   Social History Narrative    Not on file        MEDS   calcium gluconate IVPB  1,000 mg Intravenous Once    finasteride  5 mg Oral Daily    insulin regular  8 Units Intravenous Once    levothyroxine  50 mcg Oral Before breakfast    metoprolol succinate  50 mg Oral Daily    piperacillin-tazobactam (ZOSYN) IVPB  4.5 g Intravenous Q8H    simvastatin  20 mg Oral QHS    sodium polystyrene  30 g Oral Q4H        ROS:          CONTINOUS INFUSIONS:      Intake/Output Summary (Last 24 hours) at 5/24/2020 1459  Last data filed at 5/24/2020 1045  Gross per 24 hour   Intake 1200 ml   Output 50 ml   Net 1150 ml        HEMODYNAMICS:    Temp:  [97.5 °F (36.4 °C)-98.5 °F (36.9 °C)] 98.5 °F (36.9 °C)  Pulse:  [69-81] 69  Resp:  [15-20] 17  SpO2:  [96 %-97 %] 96 %  BP: ()/() 87/63   Gen:   Abdominal pain  SOB  Decreased intake    No fever  No chills  No nausea  No vomiting  No diarrhea  No CP  No Cough  No dysuria    Cards:Pulse 69  Pul: diminished breath sounds  Abdomen tender   Ext: no edema   LABS   Lab Results   Component Value Date    WBC 20.78 (H) 05/24/2020    HGB 11.5 (L) 05/24/2020    HCT 37.1 (L) 05/24/2020     (H) 05/24/2020     (L) 05/24/2020        Recent Labs   Lab  05/24/20  0529   *   CALCIUM 8.6*   ALBUMIN 3.0*   PROT 6.7      K 5.4*   CO2 25      BUN 54*   CREATININE 2.7*   ALKPHOS 62   ALT 20   AST 32   BILITOT 4.1*      Lab Results   Component Value Date    CALCIUM 8.6 (L) 05/24/2020    PHOS 4.4 11/23/2018      No results found for: IRON, TIBC, FERRITIN     ABG  No results for input(s): PH, PO2, PCO2, HCO3, BE in the last 168 hours.      IMAGING:  CXR    ASSESSMENT / PLAN  RUQ pain  CT  1. There are multiple stones in the dependent portion of the gallbladder. One of the larger ones measures 10 mm.  There are several stones in the common bile duct.  One of the larger ones measures 4 mm and is located in the distal aspect of the common bile duct. There is no abnormal dilatation of the common bile duct.  2. There are subtle inflammatory changes adjacent to the head of the pancreas.  A pancreatitis cannot be excluded.  3. There is a 19 mm area of hypodensity in the head of the pancreas that has a Hounsfield measurement of -86.   JOI/ CKD 3b- 4  CT  There is a mild amount of bilateral perinephric stranding.  There is no hydronephrosis or nephrolithiasis.  The ureters and the urinary bladder are normal in appearance.    UA protein trace, blood negative  K 5.4  Azotemia  BUN 54  Creatinine 2.7  Metabolic bone disease  Anemia Hb 11.5  Poor nutrition  Albumin 3  Hypotension  BP 87/63  CXR The lung volumes are low with bibasilar areas of scarring and/or atelectasis.  No confluent airspace opacity.    Avoid nephrotoxic agents, hypotension, hypovolemia  LR

## 2020-05-24 NOTE — ASSESSMENT & PLAN NOTE
AICD (automatic cardioverter/defibrillator) present  S/P CABG (coronary artery bypass graft)  Coronary artery disease involving native coronary artery of native heart without angina pectoris  Continue statin  Hold Lasix  Monitor  Hold K

## 2020-05-24 NOTE — SUBJECTIVE & OBJECTIVE
Past Medical History:   Diagnosis Date    Atrial fibrillation     Coronary artery disease     Encounter for blood transfusion     Hyperlipidemia     Hypertension        Past Surgical History:   Procedure Laterality Date    CARDIAC CATHETERIZATION  2016    CORONARY ARTERY BYPASS GRAFT  2006    TOTAL KNEE ARTHROPLASTY Left 2010            Review of patient's allergies indicates:   Allergen Reactions    Xarelto [rivaroxaban] Anaphylaxis       No current facility-administered medications on file prior to encounter.      Current Outpatient Medications on File Prior to Encounter   Medication Sig    allopurinol (ZYLOPRIM) 300 MG tablet TAKE 1 TABLET EVERY DAY    apixaban (ELIQUIS) 5 mg Tab Take 1 tablet (5 mg total) by mouth 2 (two) times daily.    finasteride (PROSCAR) 5 mg tablet Take 1 tablet (5 mg total) by mouth once daily.    furosemide (LASIX) 20 MG tablet Take 1 tablet (20 mg total) by mouth 2 (two) times daily.    levothyroxine (SYNTHROID) 50 MCG tablet TAKE 1 TABLET EVERY DAY    metoprolol succinate (TOPROL-XL) 50 MG 24 hr tablet Take 1 tablet (50 mg total) by mouth once daily.    potassium chloride SA (K-DUR,KLOR-CON) 10 MEQ tablet TAKE 1 TABLET EVERY DAY    simvastatin (ZOCOR) 20 MG tablet Take 1 tablet (20 mg total) by mouth every evening.     Family History     None        Tobacco Use    Smoking status: Never Smoker    Smokeless tobacco: Never Used   Substance and Sexual Activity    Alcohol use: No    Drug use: No    Sexual activity: Not on file     Review of Systems   Constitutional: Negative for chills and fever.   HENT: Negative for congestion and rhinorrhea.    Eyes: Negative for photophobia and discharge.   Respiratory: Negative for chest tightness and shortness of breath.    Cardiovascular: Negative for chest pain.   Gastrointestinal: Positive for abdominal pain. Negative for abdominal distention, blood in stool, diarrhea, nausea, rectal pain and vomiting.   Endocrine: Negative for  polyphagia and polyuria.   Genitourinary: Negative for difficulty urinating and frequency.   Musculoskeletal: Negative for arthralgias and myalgias.   Skin: Negative for rash.   Neurological: Negative for weakness.   Psychiatric/Behavioral: Negative for agitation.     Objective:     Vital Signs (Most Recent):  Temp: 97.7 °F (36.5 °C) (05/24/20 0809)  Pulse: 69 (05/24/20 1103)  Resp: 17 (05/24/20 0809)  BP: (!) 96/56 (05/24/20 1103)  SpO2: 96 % (05/24/20 0738) Vital Signs (24h Range):  Temp:  [97.5 °F (36.4 °C)-98.1 °F (36.7 °C)] 97.7 °F (36.5 °C)  Pulse:  [69-81] 69  Resp:  [15-20] 17  SpO2:  [96 %-97 %] 96 %  BP: ()/() 96/56     Weight: 79.8 kg (175 lb 14.8 oz)  Body mass index is 26.75 kg/m².    Physical Exam   Constitutional: He is oriented to person, place, and time. He appears well-developed and well-nourished.   HENT:   Head: Normocephalic and atraumatic.   Neck: Neck supple.   Cardiovascular: Normal rate, regular rhythm and normal heart sounds. Exam reveals no gallop and no friction rub.   No murmur heard.  Pulmonary/Chest: Effort normal and breath sounds normal. He exhibits no tenderness.   Abdominal: Soft. He exhibits no distension. There is tenderness in the right upper quadrant. There is positive Lin's sign.   Neurological: He is alert and oriented to person, place, and time.       Significant Labs:   Bilirubin:   Recent Labs   Lab 05/21/20  1056 05/23/20  1618 05/24/20  0529   BILITOT 2.5* 4.1* 4.1*     Blood Culture:   Recent Labs   Lab 05/23/20  1650 05/23/20  1700   LABBLOO No Growth to date Gram stain vinh bottle: Gram positive cocci in chains resembling Strep   Results called to and read back by: Brenna Will RN 05/24/2020  10:57     CBC:   Recent Labs   Lab 05/23/20 1618 05/24/20  0529   WBC 20.47* 20.78*   HGB 11.8* 11.5*   HCT 37.6* 37.1*   * 139*     CMP:   Recent Labs   Lab 05/23/20 1618 05/24/20  0529    138   K 4.1 5.4*    106   CO2 25 25   GLU 84 183*    BUN 51* 54*   CREATININE 2.37* 2.7*   CALCIUM 8.9 8.6*   PROT 7.4 6.7   ALBUMIN 4.1 3.0*   BILITOT 4.1* 4.1*   ALKPHOS 58 62   AST 43 32   ALT 21 20   ANIONGAP 9 7*   EGFRNONAA 24.1* 21*     Lactic Acid: No results for input(s): LACTATE in the last 48 hours.  Lipid Panel: No results for input(s): CHOL, HDL, LDLCALC, TRIG, CHOLHDL in the last 48 hours.    Significant Imaging: I have reviewed all pertinent imaging results/findings within the past 24 hours.

## 2020-05-24 NOTE — ASSESSMENT & PLAN NOTE
- I am concerned that he is developing pancreatitis from the gallstones.  I am repeating lipase.  If elevated, he will need to be transferred to ICU and given aggressive IVF.

## 2020-05-24 NOTE — ASSESSMENT & PLAN NOTE
- Stones in CBD on imaging.  I will schedule for ERCP tomorrow, but as he may have developing pancreatitis I am contacting [facility] (ERCP) physician on call to make sure this does not need to be done today.  I defer to his opinion on this.  - F/u repeat lipase and treat as pancreatitis if this has increased  - Cont abx, NPO

## 2020-05-24 NOTE — NURSING
Patient is wheezing.  Patient is trying to void per urinal but unable to.  Bladder scan patient, bladder with 34ml according to Bladder scan.  Dr. Pope notified.  Calcium Gluconate in progress at this time.  Dextrose and Insulin Regular given.  Will continue to monitor.

## 2020-05-24 NOTE — HPI
86 yo M complains of RUQ pain.  The pt has had RUQ radiating up into his right chest for the past few days.  When he presented to the ED yesterday, it was very severe.  He states that it has improved slightly but he is still in extreme pain.  He also has RUQ pain with deep inspiration.  He denies n/v, constipation or diarrhea, or bleeding.  He states he continues to feel somewhat SOB.    Last dose of Eliquis was Sat am.

## 2020-05-24 NOTE — H&P
Short Stay Endoscopy History and Physical    PCP - Pa Spence MD  Referring Physician - Aaareferral Self  No address on file    Procedure - ercp  ASA - per anesthesia  Mallampati - per anesthesia  History of Anesthesia problems - no  Family history Anesthesia problems -  no   Plan of anesthesia - General    HPI:  This is a 85 y.o. male here for evaluation of: cbd stone    Reflux - no  Dysphagia - no  Abdominal pain - no  Diarrhea - no    ROS:  Constitutional: No fevers, chills, No weight loss  CV: No chest pain  Pulm: No cough, No shortness of breath  Ophtho: No vision changes  GI: see HPI  Derm: No rash    Medical History:  has a past medical history of Atrial fibrillation, Coronary artery disease, Encounter for blood transfusion, Hyperlipidemia, and Hypertension.    Surgical History:  has a past surgical history that includes Coronary artery bypass graft (2006); Cardiac catheterization (2016); and Total knee arthroplasty (Left, 2010).    Family History: family history is not on file..    Social History:  reports that he has never smoked. He has never used smokeless tobacco. He reports that he does not drink alcohol or use drugs.    Review of patient's allergies indicates:   Allergen Reactions    Xarelto [rivaroxaban] Anaphylaxis       Medications:   Medications Prior to Admission   Medication Sig Dispense Refill Last Dose    allopurinol (ZYLOPRIM) 300 MG tablet TAKE 1 TABLET EVERY DAY 90 tablet 4 5/23/2020 at Unknown time    apixaban (ELIQUIS) 5 mg Tab Take 1 tablet (5 mg total) by mouth 2 (two) times daily. 180 tablet 3 5/23/2020 at Unknown time    finasteride (PROSCAR) 5 mg tablet Take 1 tablet (5 mg total) by mouth once daily. 90 tablet 3 5/23/2020 at Unknown time    furosemide (LASIX) 20 MG tablet Take 1 tablet (20 mg total) by mouth 2 (two) times daily. 180 tablet 3 5/23/2020 at Unknown time    levothyroxine (SYNTHROID) 50 MCG tablet TAKE 1 TABLET EVERY DAY 90 tablet 4 5/23/2020 at Unknown time     metoprolol succinate (TOPROL-XL) 50 MG 24 hr tablet Take 1 tablet (50 mg total) by mouth once daily. 90 tablet 3 5/23/2020 at Unknown time    potassium chloride SA (K-DUR,KLOR-CON) 10 MEQ tablet TAKE 1 TABLET EVERY DAY 90 tablet 3 5/23/2020 at Unknown time    simvastatin (ZOCOR) 20 MG tablet Take 1 tablet (20 mg total) by mouth every evening. 90 tablet 3 5/23/2020 at Unknown time       Physical Exam:    Vital Signs:   Vitals:    05/24/20 1621   BP: 108/62   Pulse: 70   Resp: (!) 24   Temp: 98.6 °F (37 °C)       General Appearance: Well appearing in no acute distress    Labs:  Lab Results   Component Value Date    WBC 20.78 (H) 05/24/2020    HGB 11.5 (L) 05/24/2020    HCT 37.1 (L) 05/24/2020     (L) 05/24/2020    CHOL 107 (L) 04/26/2018    TRIG 56 04/26/2018    HDL 45 04/26/2018    ALT 20 05/24/2020    AST 32 05/24/2020     (L) 05/24/2020    K 5.6 (H) 05/24/2020    K 5.6 (H) 05/24/2020     05/24/2020    CREATININE 3.1 (H) 05/24/2020    BUN 60 (H) 05/24/2020    CO2 23 05/24/2020    TSH 1.130 05/21/2020    INR 1.6 (H) 05/21/2020    HGBA1C 5.5 04/26/2018       I have explained the risks and benefits of this endoscopic procedure to the patient including but not limited to bleeding, inflammation, infection, perforation, and death.      David Cox MD

## 2020-05-24 NOTE — CARE UPDATE
RT called to bedside to assess patient experiencing shortness of breath. Upon assessment, no wheezing is noted, sats are WNL, and patient's breathing pattern is even and unlabored. Patient states that he is having pain. RN at the bedside. Patient placed on 2 NC for comfort.

## 2020-05-24 NOTE — HPI
Danyel Rangel is an 86 y/o M with PMH of Afib, CAD, HTN, and HLD admitted by surgery with cholecystitis without cholelithiasis and SOB. Noted RUQ pain with deep breath denies nausea, chills fever or vomiting. Hospital medicine has been consulted for medical management.

## 2020-05-24 NOTE — PROGRESS NOTES
"Surgery follow up  BP (!) 86/57 (BP Location: Left arm, Patient Position: Lying)   Pulse 70   Temp 97.7 °F (36.5 °C) (Oral)   Resp 17   Ht 5' 8" (1.727 m)   Wt 79.8 kg (175 lb 14.8 oz)   SpO2 96%   BMI 26.75 kg/m²   I/O last 3 completed shifts:  In: 1100 [IV Piggyback:1100]  Out: 0   No intake/output data recorded.   Recent Results (from the past 336 hour(s))   CBC auto differential    Collection Time: 05/24/20  5:29 AM   Result Value Ref Range    WBC 20.78 (H) 3.90 - 12.70 K/uL    Hemoglobin 11.5 (L) 14.0 - 18.0 g/dL    Hematocrit 37.1 (L) 40.0 - 54.0 %    Platelets 139 (L) 150 - 350 K/uL   CBC auto differential    Collection Time: 05/23/20  4:18 PM   Result Value Ref Range    WBC 20.47 (H) 3.90 - 12.70 K/uL    Hemoglobin 11.8 (L) 14.0 - 18.0 g/dL    Hematocrit 37.6 (L) 40.0 - 54.0 %    Platelets 132 (L) 150 - 350 K/uL   CBC auto differential    Collection Time: 05/21/20 10:56 AM   Result Value Ref Range    WBC 9.29 3.90 - 12.70 K/uL    Hemoglobin 11.6 (L) 14.0 - 18.0 g/dL    Hematocrit 35.9 (L) 40.0 - 54.0 %    Platelets 117 (L) 150 - 350 K/uL     No results found for this or any previous visit (from the past 336 hour(s)).    confused , trying to get out of bed , on nasal oxygen   H/o fall and has suturing of laceration scalp two days ago , ct scan head negative , , abdomen distended , , lungs clear , mils wheezing  Plan: check cxr, iv kvo awaiting consult gi  And internal meidicne  voided only 50 cc will check bladder scan.CBC elevated probably ascending cholangitis awaiting gi consult.  "

## 2020-05-24 NOTE — NURSING
Oleary placed with some difficulty.  14F in place and drained 200ml ronna colored urine.  Patient states that he is feeling so much better, was feeling bladder fullness and discomfort.  To Endo now for ERCP.  Will continue to monitor.

## 2020-05-24 NOTE — H&P
Chief Complaint   Patient presents with    Abdominal Pain       Pt with c/o R flank/abdominal pain that started today with associated SOB. LBM today.     Shortness of Breath         HPI  Danyel Rangel is a 85 y.o. male who presents with right upper quadrant pain.  Patient reports this right upper quadrant pain is severe and unrelenting today.  He had a CT scan yesterday ordered by his primary doctor which showed gallstones.  The patient denies any vomiting today.  He denies any diarrhea or trauma or suspicious food.  He initially reported shortness of breath but later clarified that when he takes a deep breath it hurts his right upper quadrant abdomen.  He denies any other breathing troubles or chest pain or palpitations or fever or coughing or other symptoms.  His pain is severe and exacerbated by touch of the right upper quadrant and relieved by nothing.     Past medical history       Past Medical History:   Diagnosis Date    Atrial fibrillation      Coronary artery disease      Encounter for blood transfusion      Hyperlipidemia      Hypertension           Current Medications     Current Facility-Administered Medications:     piperacillin-tazobactam 4.5 g in dextrose 5 % 100 mL IVPB (ready to mix system), 4.5 g, Intravenous, ED 1 Time, Kody Anton MD    sodium chloride 0.9% bolus 1,000 mL, 1,000 mL, Intravenous, ED 1 Time, Kody Anton MD     Current Outpatient Medications:     allopurinol (ZYLOPRIM) 300 MG tablet, TAKE 1 TABLET EVERY DAY, Disp: 90 tablet, Rfl: 4    apixaban (ELIQUIS) 5 mg Tab, Take 1 tablet (5 mg total) by mouth 2 (two) times daily., Disp: 180 tablet, Rfl: 3    finasteride (PROSCAR) 5 mg tablet, Take 1 tablet (5 mg total) by mouth once daily., Disp: 90 tablet, Rfl: 3    furosemide (LASIX) 20 MG tablet, Take 1 tablet (20 mg total) by mouth 2 (two) times daily., Disp: 180 tablet, Rfl: 3    levothyroxine (SYNTHROID) 50 MCG tablet, TAKE 1 TABLET EVERY DAY, Disp: 90  tablet, Rfl: 4    metoprolol succinate (TOPROL-XL) 50 MG 24 hr tablet, Take 1 tablet (50 mg total) by mouth once daily., Disp: 90 tablet, Rfl: 3    potassium chloride SA (K-DUR,KLOR-CON) 10 MEQ tablet, TAKE 1 TABLET EVERY DAY, Disp: 90 tablet, Rfl: 3    simvastatin (ZOCOR) 20 MG tablet, Take 1 tablet (20 mg total) by mouth every evening., Disp: 90 tablet, Rfl: 3     Allergies       Review of patient's allergies indicates:   Allergen Reactions    Xarelto [rivaroxaban] Anaphylaxis         Surgical history        Past Surgical History:   Procedure Laterality Date    CARDIAC CATHETERIZATION   2016    CORONARY ARTERY BYPASS GRAFT   2006    TOTAL KNEE ARTHROPLASTY Left 2010               Social history  Social History            Socioeconomic History    Marital status:        Spouse name: Not on file    Number of children: Not on file    Years of education: Not on file    Highest education level: Not on file   Occupational History    Not on file   Social Needs    Financial resource strain: Not on file    Food insecurity:       Worry: Not on file       Inability: Not on file    Transportation needs:       Medical: Not on file       Non-medical: Not on file   Tobacco Use    Smoking status: Never Smoker    Smokeless tobacco: Never Used   Substance and Sexual Activity    Alcohol use: No    Drug use: No    Sexual activity: Not on file   Lifestyle    Physical activity:       Days per week: Not on file       Minutes per session: Not on file    Stress: Not on file   Relationships    Social connections:       Talks on phone: Not on file       Gets together: Not on file       Attends Bahai service: Not on file       Active member of club or organization: Not on file       Attends meetings of clubs or organizations: Not on file       Relationship status: Not on file   Other Topics Concern    Not on file   Social History Narrative    Not on file         Family History  History reviewed. No  "pertinent family history.     Review of systems  Constitutional: No fever or weakness.  Skin: No rash, abscess, or laceration.  Neurologic: No new focal weakness or sensory changes.  All systems otherwise negative except as noted in ROS and HPI     Physical Exam  Vital signs: /68 (BP Location: Right arm, Patient Position: Sitting)   Pulse 69   Temp 97.5 °F (36.4 °C) (Oral)   Resp 18   Ht 5' 8" (1.727 m)   Wt 76.2 kg (168 lb)   SpO2 97%   BMI 25.54 kg/m²   Constitutional: No acute distress.  Well developed, alert, oriented and appropriate.  HENT: Normocephalic, atraumatic. Normal ear, nose, and throat.  Eyes: PERRL, EOMI, normal conjunctiva.  Neck: Normal range of motion, no tenderness; supple.  Respiratory: Nonlabored breathing with normal breath sounds.  Cardiovascular: RRR with no pulse deficit.  GI:  Very tender to the right upper quadrant.  Positive Lin's; positive ultrasonographic Lin's by my bedside exam  Musculoskeletal: Normal ROM, no tenderness, injury, or edema.  Skin: Warm, dry skin without infection or injury.  Neurologic: Normal motor, sensation with no new focal deficit.  Psychiatric: Affect normal, judgement normal, mood normal.  No SI, HI, and not gravely disabled.     Labs  Pertinent labs reviewed (see chart for details)        Labs Reviewed   CBC W/ AUTO DIFFERENTIAL - Abnormal; Notable for the following components:       Result Value      WBC 20.47 (*)       RBC 3.78 (*)       Hemoglobin 11.8 (*)       Hematocrit 37.6 (*)       Mean Corpuscular Volume 100 (*)       Mean Corpuscular Hemoglobin 31.2 (*)       Mean Corpuscular Hemoglobin Conc 31.4 (*)       RDW 14.6 (*)       Platelets 132 (*)       Immature Granulocytes 0.7 (*)       Gran # (ANC) 17.0 (*)       Immature Grans (Abs) 0.15 (*)       Mono # 1.8 (*)       Gran% 83.2 (*)       Lymph% 7.4 (*)       All other components within normal limits   COMPREHENSIVE METABOLIC PANEL - Abnormal; Notable for the following components: "     BUN, Bld 51 (*)       Creatinine 2.37 (*)       Total Bilirubin 4.1 (*)       eGFR if  27.8 (*)       eGFR if non  24.1 (*)       All other components within normal limits   CULTURE, BLOOD   CULTURE, BLOOD   LIPASE   URINALYSIS, REFLEX TO URINE CULTURE   SARS-COV-2 RNA AMPLIFICATION, QUAL         ECG      Results for orders placed or performed in visit on 02/11/20   EKG 12-lead     Collection Time: 02/11/20  7:55 AM     Narrative     Test Reason : R07.9     Vent. Rate : 070 BPM     Atrial Rate : 250 BPM     P-R Int : 000 ms          QRS Dur : 182 ms      QT Int : 492 ms       P-R-T Axes : 000 -84 053 degrees     QTc Int : 531 ms     Ventricular-paced rhythm  Abnormal ECG  When compared with ECG of 10-OCT-2016 13:57,  No significant change was found  Confirmed by Vishal CHA MD, Chilo DANIELLE ([id number]) on 2/17/2020 12:03:05 PM     Referred By:  DEMETRIO           Confirmed By:Chilo Rodgers III, MD      ECG interpreted by ED MD     Radiology  No orders to display         Procedures  Procedures     Medications   piperacillin-tazobactam 4.5 g in dextrose 5 % 100 mL IVPB (ready to mix system) (has no administration in time range)   sodium chloride 0.9% bolus 1,000 mL (has no administration in time range)   morphine injection 4 mg (4 mg Intravenous Given 5/23/20 1622)   ondansetron injection 8 mg (8 mg Intravenous Given 5/23/20 1622)         ED course and medical decision making         ED Course as of May 23 1654   Sat May 23, 2020   1623 EKG shows paced rhythm at 70 beats per minute    [MB]   1653 D/w me and he will admit with consult to GI for ERCP, then likely cholecystectomy    [MB]                     Final impression  1. Cholecystitis without cholelithiasis    2. SOB (shortness of breath)

## 2020-05-25 PROBLEM — R06.02 SHORTNESS OF BREATH: Status: ACTIVE | Noted: 2020-01-01

## 2020-05-25 PROBLEM — Z71.89 GOALS OF CARE, COUNSELING/DISCUSSION: Status: ACTIVE | Noted: 2020-01-01

## 2020-05-25 PROBLEM — R34 DECREASED URINE OUTPUT: Status: ACTIVE | Noted: 2020-01-01

## 2020-05-25 PROBLEM — Z71.89 COUNSELING REGARDING ADVANCE CARE PLANNING AND GOALS OF CARE: Status: ACTIVE | Noted: 2020-01-01

## 2020-05-25 PROBLEM — R79.89 ELEVATED TROPONIN: Status: ACTIVE | Noted: 2020-01-01

## 2020-05-25 PROBLEM — A41.9 SEPSIS: Status: ACTIVE | Noted: 2020-01-01

## 2020-05-25 PROBLEM — R79.89 ELEVATED BRAIN NATRIURETIC PEPTIDE (BNP) LEVEL: Status: ACTIVE | Noted: 2020-01-01

## 2020-05-25 PROBLEM — Z51.5 PALLIATIVE CARE ENCOUNTER: Status: ACTIVE | Noted: 2020-01-01

## 2020-05-25 PROBLEM — R78.81 BACTEREMIA: Status: ACTIVE | Noted: 2020-01-01

## 2020-05-25 PROBLEM — R13.10 DYSPHAGIA: Status: ACTIVE | Noted: 2020-01-01

## 2020-05-25 NOTE — ANESTHESIA POSTPROCEDURE EVALUATION
Anesthesia Post Evaluation    Patient: Danyel Rnagel    Procedure(s) Performed: Procedure(s) (LRB):  ERCP (ENDOSCOPIC RETROGRADE CHOLANGIOPANCREATOGRAPHY) (N/A)    Final Anesthesia Type: general    Patient location during evaluation: PACU  Patient participation: Yes- Able to Participate  Level of consciousness: awake and alert  Post-procedure vital signs: reviewed and stable  Pain management: adequate  Airway patency: patent    PONV status at discharge: No PONV  Anesthetic complications: no      Cardiovascular status: blood pressure returned to baseline  Respiratory status: unassisted  Hydration status: euvolemic  Follow-up not needed.          Vitals Value Taken Time   /67 5/25/2020  6:46 PM   Temp 36.9 °C (98.5 °F) 5/25/2020  2:45 PM   Pulse 69 5/25/2020  6:47 PM   Resp 20 5/25/2020  6:47 PM   SpO2 86 % 5/25/2020  6:47 PM   Vitals shown include unvalidated device data.      Event Time     Out of Recovery 05/24/2020 20:02:06          Pain/Anthony Score: Pain Rating Prior to Med Admin: 0 (5/25/2020  5:30 AM)  Anthony Score: 8 (5/24/2020 10:00 PM)

## 2020-05-25 NOTE — PROGRESS NOTES
[facility] Hospital Medicine  Progress Note    Patient Name: Danyel Rangel  MRN: [id number]  Patient Class: OP- Observation   Admission Date: 5/23/2020  Length of Stay: 0 days  Attending Physician: Nelida Mcdaniel*  Primary Care Provider: Pa Spence MD        Subjective:     Principal Problem:Cholecystitis without cholelithiasis        HPI:  Danyel Rangel is an 84 y/o M with PMH of Afib, CAD, HTN, and HLD admitted by surgery with cholecystitis without cholelithiasis and SOB. Noted RUQ pain with deep breath denies nausea, chills fever or vomiting. Hospital medicine has been consulted for medical management.    Overview/Hospital Course:  No notes on file    Interval History: confuse, reported confusion started after ERCP. CT head -no acute findings  Blood cx with enterococcus resembling strep- continue Vancomycin, repeat blood cx, consult ID  Troponin elevated but stable trending -likely due to Shayla  awaits SLP eval.  Renal dose Eliquis at 2.5    Review of Systems   Cardiovascular: Negative for chest pain.   Gastrointestinal: Negative for abdominal distention, abdominal pain, diarrhea and rectal pain.   Genitourinary: Positive for decreased urine volume. Negative for frequency.   Musculoskeletal: Negative for arthralgias and myalgias.   Skin: Negative for rash.   Psychiatric/Behavioral: Positive for agitation and confusion.     Objective:     Vital Signs (Most Recent):  Temp: 97.1 °F (36.2 °C) (05/25/20 0516)  Pulse: 70 (05/25/20 0516)  Resp: 17 (05/25/20 0516)  BP: 99/60 (05/25/20 0516)  SpO2: 98 % (05/25/20 0516) Vital Signs (24h Range):  Temp:  [97.1 °F (36.2 °C)-98.6 °F (37 °C)] 97.1 °F (36.2 °C)  Pulse:  [69-90] 70  Resp:  [17-24] 17  SpO2:  [90 %-100 %] 98 %  BP: ()/(50-93) 99/60     Weight: 78.8 kg (173 lb 11.6 oz)  Body mass index is 26.41 kg/m².    Intake/Output Summary (Last 24 hours) at 5/25/2020 0751  Last data filed at 5/25/2020 0622  Gross per 24 hour   Intake 1618.75  ml   Output 300 ml   Net 1318.75 ml      Physical Exam   Constitutional: He is oriented to person, place, and time. He appears well-developed and well-nourished.   HENT:   Head: Normocephalic and atraumatic.   Neck: Neck supple.   Cardiovascular: Normal rate, regular rhythm and normal heart sounds. Exam reveals no gallop and no friction rub.   No murmur heard.  Pulmonary/Chest: Effort normal and breath sounds normal. He exhibits no tenderness.   Abdominal: Soft. He exhibits no distension. There is no tenderness.   Neurological: He is alert and oriented to person, place, and time.   Skin:   RLE with bruise, reported a fall prior to admission   Psychiatric:   confuse       Significant Labs:   Bilirubin:   Recent Labs   Lab 05/21/20  1056 05/23/20  1618 05/24/20  0529 05/25/20  0450   BILITOT 2.5* 4.1* 4.1* 5.6*     Blood Culture:   Recent Labs   Lab 05/23/20  1650 05/23/20  1700   LABBLOO Gram stain vinh bottle: Gram positive cocci in chains resembling Strep   Results called to and read back by:Kathya Sanchez RN 05/24/2020  19:03  ENTEROCOCCUS SPECIES  Identification pending  For susceptibility see order #[id number]  * Gram stain vinh bottle: Gram positive cocci in chains resembling Strep   Results called to and read back by: Brenna Will RN 05/24/2020  10:57  ENTEROCOCCUS SPECIES  Identification and susceptibility pending  *     CBC:   Recent Labs   Lab 05/23/20 1618 05/24/20  0529 05/25/20  0450   WBC 20.47* 20.78* 22.90*   HGB 11.8* 11.5* 11.1*   HCT 37.6* 37.1* 35.2*   * 139* 151     CMP:   Recent Labs   Lab 05/23/20  1618 05/24/20  0529  05/24/20  2018 05/25/20  0450 05/25/20  0803    138   < > 138 136  136  136 136   K 4.1 5.4*   < > 4.7  4.7  4.5 5.0  5.0  5.0  5.0 5.2*  5.2*    106   < > 105 104  104  104 103   CO2 25 25   < > 20* 18*  18*  18* 20*   GLU 84 183*   < > 55* 113*  113*  113* 126*   BUN 51* 54*   < > 62* 68*  68*  68* 71*   CREATININE 2.37* 2.7*   < > 3.4*  3.8*  3.8*  3.8* 4.1*   CALCIUM 8.9 8.6*   < > 8.6* 8.4*  8.4*  8.4* 8.4*   PROT 7.4 6.7  --   --  6.4  --    ALBUMIN 4.1 3.0*  --   --  2.6*  --    BILITOT 4.1* 4.1*  --   --  5.6*  --    ALKPHOS 58 62  --   --  52*  --    AST 43 32  --   --  26  --    ALT 21 20  --   --  19  --    ANIONGAP 9 7*   < > 13 14  14  14 13   EGFRNONAA 24.1* 21*   < > 16* 14*  14*  14* 12*    < > = values in this interval not displayed.     Lipid Panel: No results for input(s): CHOL, HDL, LDLCALC, TRIG, CHOLHDL in the last 48 hours.  Urine Culture: No results for input(s): LABURIN in the last 48 hours.  Urine Studies:   Recent Labs   Lab 05/24/20  1026   COLORU Ann-Marie   APPEARANCEUA Hazy*   PHUR 5.0   SPECGRAV 1.025   PROTEINUA Trace*   GLUCUA Negative   KETONESU Trace*   BILIRUBINUA 1+*   OCCULTUA Negative   NITRITE Negative   UROBILINOGEN 1.0   LEUKOCYTESUR Negative       Significant Imaging: I have reviewed all pertinent imaging results/findings within the past 24 hours.      Assessment/Plan:      * Cholecystitis without cholelithiasis  Leukocytosis  Surgery on board  GI consult- s/p ERCP with no stent placement.        Bacteremia  Blood cx with enterococcus resembling strep- continue Vancomycin  Repeat blood cx  Consult ID      Dysphagia  Awaits SLP eval      JOI (acute kidney injury)  CKD (chronic kidney disease), stage III  Hyperkalemia  Avoid nephrotoxic medication  Strict input/ output  Renally dose of medication  Gentle rehydration  Treat hyperk  Consult nephrology- appreciates rec's  Kidney USS with bilateral perinephric stranding.  continue Zosyn      Chronic systolic congestive heart failure  AICD (automatic cardioverter/defibrillator) present  S/P CABG (coronary artery bypass graft)  Coronary artery disease involving native coronary artery of native heart without angina pectoris  Continue statin  Hold Lasix  Monitor  Hold K          Benign prostatic hyperplasia with weak urinary stream    Continue  Proscar    Hypothyroidism  Continue Synthroid      HTN (hypertension), benign  Hyperlipidemia  Continues statin  Continue Metoprolol,   Hold Lasix        Chronic atrial fibrillation  Continue metoprolol  Eliquis was on hold for procedure, will restart at lower dose due on hold         VTE Risk Mitigation (From admission, onward)         Ordered     apixaban tablet 2.5 mg  2 times daily      05/25/20 0904                      Nelida Mcdaniel MD  Department of Hospital Medicine   [facility]

## 2020-05-25 NOTE — PROGRESS NOTES
Spoke with nephro  and informed that patient has not urinated throughout night.  Wesley Kay stated she will send message to Dr. Andrade

## 2020-05-25 NOTE — ACP (ADVANCE CARE PLANNING)
"Discussed again with patient's granddaughter, Nyla, who was tearful stating, "he lost his wife few years ago and has since been ready to go". Nyla reported patient is a DNR and does not want aggressive therapy. Patient had initially decline present hospitalization but was encourage by family to come to the hospital. Family wants to try dialysis and if patient continue to decline would like, to re-address goals of care as possible no aggressive treatment. Questions and concern were addressed. Family would like to come see patient if he declines. Emotional comfort provided.   "

## 2020-05-25 NOTE — PLAN OF CARE
Consult for swallow eval received this date, pt safe for dental soft tray and thin liquids, pt will need assist with tray set-up. Pt may take whole meds one by one or crush if necessary. Pt did not exhibit any outward s/s of dysphagia.

## 2020-05-25 NOTE — SUBJECTIVE & OBJECTIVE
Past Medical History:   Diagnosis Date    Atrial fibrillation     Coronary artery disease     Encounter for blood transfusion     Hyperlipidemia     Hypertension        Past Surgical History:   Procedure Laterality Date    CARDIAC CATHETERIZATION  2016    CORONARY ARTERY BYPASS GRAFT  2006    ERCP N/A 5/24/2020    Procedure: ERCP (ENDOSCOPIC RETROGRADE CHOLANGIOPANCREATOGRAPHY);  Surgeon: David Cox MD;  Location: [facility];  Service: Endoscopy;  Laterality: N/A;    TOTAL KNEE ARTHROPLASTY Left 2010            Review of patient's allergies indicates:   Allergen Reactions    Xarelto [rivaroxaban] Anaphylaxis       Medications:  Continuous Infusions:   sodium chloride 0.9%       Scheduled Meds:   albuterol-ipratropium  3 mL Nebulization Once    apixaban  2.5 mg Oral BID    docusate sodium  100 mg Oral BID    finasteride  5 mg Oral Daily    furosemide (LASIX) IV  40 mg Intravenous Once    levothyroxine  50 mcg Oral Before breakfast    metoprolol succinate  50 mg Oral Daily    piperacillin-tazobactam (ZOSYN) IVPB  4.5 g Intravenous Q12H    simvastatin  20 mg Oral QHS     PRN Meds:albuterol-ipratropium, dextrose 50 % in water (D50W), HYDROcodone-acetaminophen, magnesium sulfate IVPB, ondansetron, promethazine (PHENERGAN) IVPB, sodium phosphate IVPB, sodium phosphate IVPB, sodium phosphate IVPB, [COMPLETED] Pharmacy to dose Vancomycin consult **AND** vancomycin - pharmacy to dose    Family History     None        Tobacco Use    Smoking status: Never Smoker    Smokeless tobacco: Never Used   Substance and Sexual Activity    Alcohol use: No    Drug use: No    Sexual activity: Not on file       Review of Systems   Constitutional: Negative for fever and unexpected weight change.   HENT: Negative for rhinorrhea and sore throat.    Respiratory: Positive for shortness of breath. Negative for cough.    Cardiovascular: Positive for chest pain.   Gastrointestinal: Positive for abdominal distention  and abdominal pain (RUQ). Negative for blood in stool, constipation, diarrhea, nausea and vomiting.   Genitourinary: Positive for difficulty urinating. Negative for dysuria and hematuria.   Musculoskeletal: Negative for myalgias.   Skin: Negative for rash.   Neurological: Negative for seizures and syncope.   Psychiatric/Behavioral: Positive for confusion.     Objective:     Vital Signs (Most Recent):  Temp: 98.5 °F (36.9 °C) (05/25/20 1445)  Pulse: 71 (05/25/20 1445)  Resp: (!) 22 (05/25/20 1445)  BP: (!) 107/57 (05/25/20 1445)  SpO2: 97 % (05/25/20 1445) Vital Signs (24h Range):  Temp:  [97.1 °F (36.2 °C)-99.1 °F (37.3 °C)] 98.5 °F (36.9 °C)  Pulse:  [70-90] 71  Resp:  [17-32] 22  SpO2:  [90 %-100 %] 97 %  BP: ()/(50-93) 107/57     Weight: 81.7 kg (180 lb 1.9 oz)  Body mass index is 27.39 kg/m².    Review of Symptoms  Symptom Assessment (ESAS 0-10 scale)   ESAS 0 1 2 3 4 5 6 7 8 9 10   Pain              Dyspnea              Anxiety              Nausea              Depression               Anorexia              Fatigue              Insomnia              Restlessness               Agitation              CAM / Delirium __ --  ___+   Constipation     __ --  ___+   Diarrhea           __ --  ___+  Bowel Management Plan (BMP): Yes    Pain Assessment: yes  Performance Status: 50    ECOG Performance Status Grade: 2 - Ambulates, capable of self care only    Physical Exam   Constitutional: He is oriented to person, place, and time. He appears well-developed and well-nourished.   HENT:   Head: Normocephalic and atraumatic.   Neck: Neck supple.   Cardiovascular: Normal rate, regular rhythm and normal heart sounds. Exam reveals no gallop and no friction rub.   No murmur heard.  Pulmonary/Chest: Effort normal and breath sounds normal. He exhibits no tenderness.   Abdominal: Soft. He exhibits no distension. There is no tenderness.   Neurological: He is alert and oriented to person, place, and time.   Skin:   RLE with bruise,  reported a fall prior to admission   Psychiatric:   confuse       Significant Labs: All pertinent labs within the past 24 hours have been reviewed.  CBC:   Recent Labs   Lab 05/25/20  0450   WBC 22.90*   HGB 11.1*   HCT 35.2*   MCV 99*        BMP:  Recent Labs   Lab 05/25/20  1225   *   *   K 5.4*  5.4*      CO2 17*   BUN 76*   CREATININE 4.4*   CALCIUM 8.4*     LFT:  Lab Results   Component Value Date    AST 26 05/25/2020    ALKPHOS 52 (L) 05/25/2020    BILITOT 5.6 (H) 05/25/2020     Albumin:   Albumin   Date Value Ref Range Status   05/25/2020 2.6 (L) 3.5 - 5.2 g/dL Final     Protein:   Total Protein   Date Value Ref Range Status   05/25/2020 6.4 6.0 - 8.4 g/dL Final     Lactic acid:   No results found for: LACTATE    Significant Imaging: I have reviewed all pertinent imaging results/findings within the past 24 hours.    Advance Care Planning   Advanced Directives::  Living Will: No  LaPOST: No  Do Not Resuscitate Status: Yes  Medical Power of : Yes.  Decision-Making Capacity: Family answered questions, Patient unable to communicate due to disease severity/cognitive impairment       Living Arrangements: Lives with family    Psychosocial/Cultural:  Patient's most important priorities:  MET    Patient's biggest concerns/fears:    MET  Previous death/end of life care history:    MET  Patient's goals/hopes:    MET  Spiritual:     F- Corrina and Belief: MET    I - Importance: MET  .  C - Community: MET  A - Address in Care:MET    Recommendations:  Continue medical treatment  Code status: DNR  Dialysis      Gia Woodard, MSN, APRN, NP-C   Palliative Medicine   [facility]  [phone] or [phone]        >50% of  60  min visit spent in chart review, face to face discussion of goals of care with patient, family, symptom assessment, coordination of care and emotional support.

## 2020-05-25 NOTE — CONSULTS
[facility]  Infectious Disease  Progress Note    Patient Name: Danyel Rangel  MRN: [id number]  Admission Date: 5/23/2020  Length of Stay: 0 days  Attending Physician: Nelida Mcdaniel*  Primary Care Provider: Pa Spence MD    Isolation Status: No active isolations  Assessment/Plan:      Bacteremia  85 y.o. M with a PMHx of CAD s/p CABG (2006), HFrEF (a/p AICD, EF 45%), a fib on eliquis, HLD, HTN, CKD3 who presented to the ED on 5/23 with severe RUQ pain and R chest pain with deep breaths but no n/v/f/c, CT A/P obtained 5/22 with gallstones in gallbladder and CBD with no evident ductal dilatation, possible inflammation around pancreas as well. Notable labs on admission WBC 20.47, T bili 4.1, lipase WNL, covid negative. Surgery consulted for admission for cholecystectomy for acute cholecystitis. Started on zosyn 5/23 and blood cultures obtained, now growing enterococcus species. Vanc added 5/24. S/p ERCP with GI on 5/24 (no stent placement). Remains AFVSS with mild worsening leukocytosis.     Recommendations:   - Continue vancomycin  - Due to worsening renal function, recommend switching from zosyn to cefepime 1 g q24h  - Continue to follow cultures and sensitivities, we will follow along with you    Thank you for your consult.. We will continue to follow.    Kelli Jimenez, LISA-MERY  Infectious Disease  [facility]    Subjective:     Principal Problem:Cholecystitis without cholelithiasis    HPI: 85 y.o. M with a PMHx of CAD s/p CABG (2006), HFrEF (s/p AICD, EF 45%), a fib on eliquis, HLD, HTN, CKD3 who presented to the ED on 5/21 after a trip and fall, hit his head and sustained a laceration but CT head showed no acute abnormality. Discharged from ED, at the time VSS and no leukocytosis, but notably T. Bili 2.5. On 5/22 went to PCP and complained of diffuse abdominal pain/distension and urinary retention x 1 day, ordered CT A/P 5/22 which showed multiple gallstones in  gallbladder and in CBD with no ductal dilitation. There was also subtle inflammatory changes adjacent to head of pancreas, can't exclude pancreatitis. On 5/23 presented to ED for severe RUQ abdominal pain and pleuritic chest pain on the R side, no nausea, vomiting, fevers, chills. Afebrile, VSS, WBC 22.9 (88% neutrophils, 0% bands), lipase 215, Cr 1.91, , covid negative. Surgery consulted for cholecystectomy, GI consulted for ERCP. Started on zosyn 5/23. Blood cultures obtained, now growing enterococcus species and vancomycin added 5/24. ID consulted for enterococcus bacteremia. Pt went for ERCP with GI 5/24. Today he had worsening confusion and repeat CT head negative for acute process. Kidney function worsening and today BUN 71/Cr 1.91, nephrology consulted and plan for HD.   Past Medical History:   Diagnosis Date    Atrial fibrillation     Coronary artery disease     Encounter for blood transfusion     Hyperlipidemia     Hypertension        Past Surgical History:   Procedure Laterality Date    CARDIAC CATHETERIZATION  2016    CORONARY ARTERY BYPASS GRAFT  2006    ERCP N/A 5/24/2020    Procedure: ERCP (ENDOSCOPIC RETROGRADE CHOLANGIOPANCREATOGRAPHY);  Surgeon: David Cox MD;  Location: [facility];  Service: Endoscopy;  Laterality: N/A;    TOTAL KNEE ARTHROPLASTY Left 2010            Review of patient's allergies indicates:   Allergen Reactions    Xarelto [rivaroxaban] Anaphylaxis       Medications:  Medications Prior to Admission   Medication Sig    allopurinol (ZYLOPRIM) 300 MG tablet TAKE 1 TABLET EVERY DAY    apixaban (ELIQUIS) 5 mg Tab Take 1 tablet (5 mg total) by mouth 2 (two) times daily.    finasteride (PROSCAR) 5 mg tablet Take 1 tablet (5 mg total) by mouth once daily.    furosemide (LASIX) 20 MG tablet Take 1 tablet (20 mg total) by mouth 2 (two) times daily.    levothyroxine (SYNTHROID) 50 MCG tablet TAKE 1 TABLET EVERY DAY    metoprolol succinate (TOPROL-XL) 50 MG 24 hr  tablet Take 1 tablet (50 mg total) by mouth once daily.    potassium chloride SA (K-DUR,KLOR-CON) 10 MEQ tablet TAKE 1 TABLET EVERY DAY    simvastatin (ZOCOR) 20 MG tablet Take 1 tablet (20 mg total) by mouth every evening.     Antibiotics (From admission, onward)    Start     Stop Route Frequency Ordered    05/25/20 2100  piperacillin-tazobactam 4.5 g in dextrose 5 % 100 mL IVPB (ready to mix system)      -- IV Every 12 hours (non-standard times) 05/25/20 1016    05/24/20 1159  vancomycin - pharmacy to dose  (vancomycin IVPB)      -- IV pharmacy to manage frequency 05/24/20 1059        Antifungals (From admission, onward)    None        Antivirals (From admission, onward)    None           Immunization History   Administered Date(s) Administered    Influenza - High Dose - PF (65 years and older) 11/10/2016, 10/25/2018    Influenza - Quadrivalent - PF (6 months and older) 11/19/2019    Influenza Split 10/20/2015    Pneumococcal Conjugate - 13 Valent 11/10/2016    Tdap 05/21/2020       Family History     None        Social History     Socioeconomic History    Marital status:      Spouse name: Not on file    Number of children: Not on file    Years of education: Not on file    Highest education level: Not on file   Occupational History    Not on file   Social Needs    Financial resource strain: Not on file    Food insecurity:     Worry: Not on file     Inability: Not on file    Transportation needs:     Medical: Not on file     Non-medical: Not on file   Tobacco Use    Smoking status: Never Smoker    Smokeless tobacco: Never Used   Substance and Sexual Activity    Alcohol use: No    Drug use: No    Sexual activity: Not on file   Lifestyle    Physical activity:     Days per week: Not on file     Minutes per session: Not on file    Stress: Not on file   Relationships    Social connections:     Talks on phone: Not on file     Gets together: Not on file     Attends Jewish service: Not on  file     Active member of club or organization: Not on file     Attends meetings of clubs or organizations: Not on file     Relationship status: Not on file   Other Topics Concern    Not on file   Social History Narrative    Not on file     Review of Systems   Constitutional: Negative for fever and unexpected weight change.   HENT: Negative for rhinorrhea and sore throat.    Respiratory: Positive for shortness of breath. Negative for cough.    Cardiovascular: Positive for chest pain.   Gastrointestinal: Positive for abdominal distention and abdominal pain (RUQ). Negative for blood in stool, constipation, diarrhea, nausea and vomiting.   Genitourinary: Positive for difficulty urinating. Negative for dysuria and hematuria.   Musculoskeletal: Negative for myalgias.   Skin: Negative for rash.   Neurological: Negative for seizures and syncope.   Psychiatric/Behavioral: Positive for confusion.     Objective:     Vital Signs (Most Recent):  Temp: 99.1 °F (37.3 °C) (05/25/20 0757)  Pulse: 75 (05/25/20 1108)  Resp: 18 (05/25/20 1108)  BP: (!) 99/59 (05/25/20 0757)  SpO2: 97 % (05/25/20 1108) Vital Signs (24h Range):  Temp:  [97.1 °F (36.2 °C)-99.1 °F (37.3 °C)] 99.1 °F (37.3 °C)  Pulse:  [69-90] 75  Resp:  [17-24] 18  SpO2:  [90 %-100 %] 97 %  BP: ()/(50-93) 99/59     Weight: 78.8 kg (173 lb 11.6 oz)  Body mass index is 26.41 kg/m².    Estimated Creatinine Clearance: 12.7 mL/min (A) (based on SCr of 4.1 mg/dL (H)).    Physical Exam  Gen: awake, alert, NAD  HEENT: PERRLA, EOMi, no oropharyngeal exudate, JVP 7 cm  CV: RRR, no m/r/g  Resp: Breathing comfortably on 3 L NC  Abd: Soft, ttp RUQ, non-distended  Ext: No cyanosis or edema  Skin: No rashes or skin breakdown noted  Neuro: A&O x 3, no abnormal tone    Significant Labs:   CBC:   Recent Labs   Lab 05/23/20  1618 05/24/20  0529 05/25/20  0450   WBC 20.47* 20.78* 22.90*   HGB 11.8* 11.5* 11.1*   HCT 37.6* 37.1* 35.2*   * 139* 151     CMP:   Recent Labs   Lab  05/23/20  1618 05/24/20  0529  05/24/20  2018 05/25/20  0450 05/25/20  0803    138   < > 138 136  136  136 136   K 4.1 5.4*   < > 4.7  4.7  4.5 5.0  5.0  5.0  5.0 5.2*  5.2*    106   < > 105 104  104  104 103   CO2 25 25   < > 20* 18*  18*  18* 20*   GLU 84 183*   < > 55* 113*  113*  113* 126*   BUN 51* 54*   < > 62* 68*  68*  68* 71*   CREATININE 2.37* 2.7*   < > 3.4* 3.8*  3.8*  3.8* 4.1*   CALCIUM 8.9 8.6*   < > 8.6* 8.4*  8.4*  8.4* 8.4*   PROT 7.4 6.7  --   --  6.4  --    ALBUMIN 4.1 3.0*  --   --  2.6*  --    BILITOT 4.1* 4.1*  --   --  5.6*  --    ALKPHOS 58 62  --   --  52*  --    AST 43 32  --   --  26  --    ALT 21 20  --   --  19  --    ANIONGAP 9 7*   < > 13 14  14  14 13   EGFRNONAA 24.1* 21*   < > 16* 14*  14*  14* 12*    < > = values in this interval not displayed.     Lipase:   Recent Labs   Lab 05/24/20  1534   LIPASE 30     Microbiology Results (last 7 days)     Procedure Component Value Units Date/Time    Blood culture [[id number]] Collected:  05/25/20 0911    Order Status:  Sent Specimen:  Blood Updated:  05/25/20 0911    Blood culture [[id number]]  (Abnormal) Collected:  05/23/20 1650    Order Status:  Completed Specimen:  Blood from Peripheral, Antecubital, Left Updated:  05/25/20 0740     Blood Culture, Routine Gram stain vinh bottle: Gram positive cocci in chains resembling Strep       Results called to and read back by:Kathya Sanchez RN 05/24/2020  19:03      ENTEROCOCCUS SPECIES  Identification pending  For susceptibility see order #[id number]      Blood culture [[id number]]  (Abnormal) Collected:  05/23/20 1700    Order Status:  Completed Specimen:  Blood from Peripheral, Antecubital, Right Updated:  05/25/20 0739     Blood Culture, Routine Gram stain vinh bottle: Gram positive cocci in chains resembling Strep       Results called to and read back by: Brenna Will RN 05/24/2020  10:57      ENTEROCOCCUS SPECIES  Identification and susceptibility pending           All pertinent labs within the past 24 hours have been reviewed.    Significant Imaging: I have reviewed all pertinent imaging results/findings within the past 24 hours.

## 2020-05-25 NOTE — PROGRESS NOTES
Pharmacokinetic Assessment Follow Up: IV Vancomycin    Vancomycin serum concentration assessment(s):    The random level was drawn correctly and can be used to guide therapy at this time. The measurement is within the desired definitive target range of 15 to 20 mcg/mL.    Vancomycin Regimen Plan:    Plans to initiate HD today noted. Will reodse with vancomycin 1g post HD today and obtain repeat random level with AM labs on 5/26 to assess need for redosing.     Drug levels (last 3 results):  Recent Labs   Lab Result Units 05/25/20  1225   Vancomycin, Random ug/mL 19.4       Pharmacy will continue to follow and monitor vancomycin.    Please contact pharmacy at extension [phone] for questions regarding this assessment.    Thank you for the consult,   Dyana Coles       Patient brief summary:  Danyel Rangel is a 85 y.o. male initiated on antimicrobial therapy with IV Vancomycin for treatment of bacteremia    The patient's current regimen is dose by level    Drug Allergies:   Review of patient's allergies indicates:   Allergen Reactions    Xarelto [rivaroxaban] Anaphylaxis       Actual Body Weight:   81kg    Renal Function:   Estimated Creatinine Clearance: 11.9 mL/min (A) (based on SCr of 4.4 mg/dL (H)).,     Dialysis Method (if applicable):  intermittent HD    CBC (last 72 hours):  Recent Labs   Lab Result Units 05/23/20  1618 05/24/20  0529 05/25/20  0450   WBC K/uL 20.47* 20.78* 22.90*   Hemoglobin g/dL 11.8* 11.5* 11.1*   Hematocrit % 37.6* 37.1* 35.2*   Platelets K/uL 132* 139* 151   Gran% % 83.2* 91.0* 88.0*   Lymph% % 7.4* 4.0* 4.0*   Mono% % 8.6 1.0* 3.0*   Eosinophil% % 0.0 0.0 0.0   Basophil% % 0.1 0.0 0.0   Differential Method  Automated Manual Manual       Metabolic Panel (last 72 hours):  Recent Labs   Lab Result Units 05/23/20  1618 05/24/20  0529 05/24/20  1026 05/24/20  1256 05/24/20  1534 05/24/20  2018 05/25/20  0450 05/25/20  0803 05/25/20  1225   Sodium mmol/L 140 138  --   --  133* 138 136  136   136 136 134*   Sodium Urine Random mmol/L  --   --   --  <20*  --   --   --   --   --    Potassium mmol/L 4.1 5.4*  --   --  5.6*  5.6* 4.7  4.7  4.5 5.0  5.0  5.0  5.0 5.2*  5.2* 5.4*  5.4*   Chloride mmol/L 106 106  --   --  101 105 104  104  104 103 102   CO2 mmol/L 25 25  --   --  23 20* 18*  18*  18* 20* 17*   Glucose mg/dL 84 183*  --   --  329* 55* 113*  113*  113* 126* 130*   Glucose, UA   --   --  Negative  --   --   --   --   --   --    BUN, Bld mg/dL 51* 54*  --   --  60* 62* 68*  68*  68* 71* 76*   Creatinine mg/dL 2.37* 2.7*  --   --  3.1* 3.4* 3.8*  3.8*  3.8* 4.1* 4.4*   Albumin g/dL 4.1 3.0*  --   --   --   --  2.6*  --   --    Total Bilirubin mg/dL 4.1* 4.1*  --   --   --   --  5.6*  --   --    Alkaline Phosphatase U/L 58 62  --   --   --   --  52*  --   --    AST U/L 43 32  --   --   --   --  26  --   --    ALT U/L 21 20  --   --   --   --  19  --   --        Vancomycin Administrations:  vancomycin given in the last 96 hours                     vancomycin (VANCOCIN) 2,000 mg in dextrose 5 % 500 mL IVPB (mg) 2,000 mg New Bag 05/24/20 1204                      Microbiologic Results:  Microbiology Results (last 7 days)       Procedure Component Value Units Date/Time    Blood culture [[id number]] Collected:  05/25/20 0911    Order Status:  Sent Specimen:  Blood Updated:  05/25/20 1414    Blood culture [[id number]]  (Abnormal) Collected:  05/23/20 1650    Order Status:  Completed Specimen:  Blood from Peripheral, Antecubital, Left Updated:  05/25/20 0740     Blood Culture, Routine Gram stain vinh bottle: Gram positive cocci in chains resembling Strep       Results called to and read back by:Kathya Sanchez RN 05/24/2020  19:03      ENTEROCOCCUS SPECIES  Identification pending  For susceptibility see order #[id number]      Blood culture [[id number]]  (Abnormal) Collected:  05/23/20 1700    Order Status:  Completed Specimen:  Blood from Peripheral, Antecubital, Right Updated:  05/25/20 0750      Blood Culture, Routine Gram stain vinh bottle: Gram positive cocci in chains resembling Strep       Results called to and read back by: Brenna Will RN 05/24/2020  10:57      ENTEROCOCCUS SPECIES  Identification and susceptibility pending

## 2020-05-25 NOTE — PT/OT/SLP EVAL
Speech Language Pathology Evaluation  Bedside Swallow    Patient Name:  Danyel Rangel   MRN:  [id number]  Admitting Diagnosis: Cholecystitis without cholelithiasis    Recommendations:     Diet recommendations:  Dental Soft, Thin   Aspiration Precautions: upright for meals, small sips/bites, tray set-up, whole meds or crush as needed, straw sips ok with liquids   General Precautions: Standard, fall(confusion)  Communication strategies:  Confusion noted, reorient, repeat instructions to him    History:     Chief Complaint   Patient presents with    Abdominal Pain       Pt with c/o R flank/abdominal pain that started today with associated SOB. LBM today.     Shortness of Breath         HPI  Danyel Rangel is a 85 y.o. male who presents with right upper quadrant pain.  Patient reports this right upper quadrant pain is severe and unrelenting today.  He had a CT scan yesterday ordered by his primary doctor which showed gallstones.  The patient denies any vomiting today.  He denies any diarrhea or trauma or suspicious food.  He initially reported shortness of breath but later clarified that when he takes a deep breath it hurts his right upper quadrant abdomen.  He denies any other breathing troubles or chest pain or palpitations or fever or coughing or other symptoms.  His pain is severe and exacerbated by touch of the right upper quadrant and relieved by nothing.    Past Medical History:   Diagnosis Date    Atrial fibrillation     Coronary artery disease     Encounter for blood transfusion     Hyperlipidemia     Hypertension        Past Surgical History:   Procedure Laterality Date    CARDIAC CATHETERIZATION  2016    CORONARY ARTERY BYPASS GRAFT  2006    ERCP N/A 5/24/2020    Procedure: ERCP (ENDOSCOPIC RETROGRADE CHOLANGIOPANCREATOGRAPHY);  Surgeon: David Cox MD;  [facility];  Service: Endoscopy;  Laterality: N/A;    TOTAL KNEE ARTHROPLASTY Left 2010            Social History: Patient lives at  "home, does not reliably answer questions.     Prior Intubation HX:  n/a    Modified Barium Swallow: no hx of dysphagia, no MBS per EMR    CT: No CT evidence of acute intracranial abnormality or significant interval detrimental change from prior head CT 5/21/2020.  Clinical correlation and further evaluation as warranted.  Chest X-Rays: Lungs are significantly hypoinflated.  Left basilar atelectatic changes are seen.  Heart is stable in size.  No evidence of pneumothorax or large pleural effusion.    Prior diet: regular diet and thin liquids    Subjective     Consult received for clinical swallow eval this date, SLP did communicate with RN prior to eval/treat.  RN reported no issues this am with morning meds, stated he is more cooperative than he was per nightshift RN.    Patient goals: "They have not fed me anything all day."    Pain/Comfort:  · Pain Rating 1: 0/10    Objective:   Pt seen in room,  leaving room.  Pt asking for large, brown bag in room. Pt grabbed bag, searched then placed on floor near his bed.   Pt is awake, alert, confusion noted during session. Pt with dcr'd orientation to place, situation and time.   Pt does not reliably answer personal questions. He kept referring to going to the bank.     Oral Musculature Evaluation  · Oral Musculature: WFL  · Dentition: present and adequate  · Mucosal Quality: good, adequate  · Oral Labial Strength and Mobility: WFL  · Lingual Strength and Mobility: WFL  · Velar Elevation: WFL  · Buccal Strength and Mobility: WFL  · Volitional Cough: elicited  · Volitional Swallow: timely  · Voice Prior to PO Intake: clear voice    Bedside Swallow Eval:   Consistencies Assessed:  · Thin liquids water by cup/straw  · Puree pudding 4 oz container  · Solids cracker     Oral Phase:   · Slow oral transit time   · Slow mastication on cracker  · Liquid rinse  · No pocketing noted    Pharyngeal Phase:   · no overt clinical signs/symptoms of aspiration  · no overt clinical " signs/symptoms of pharyngeal dysphagia   · No coughing/choking or changes in voice across consistencies.     Compensatory Strategies  · slow rate of intake   · Tray set-up  · Alternate sips and bites    Treatment: SLP did communicate results with pt who did not verbalize or demo any teach back. He did inquire about when his lunch tray would arrive.   SLP sent message to MD and RN re: diet orders. Diet order entered into epic.    Assessment:     Danyel Rangel is a 85 y.o. male admitted with Cholecystitis without cholelithiasis who presents with an SLP diagnosis of no outward s/s of dysphagia present. Pt safe for continued oral intake, pt with intermittent confusion t/o eval.     Goals:   Multidisciplinary Problems     SLP Goals     Not on file                Plan:     · Patient to be seen:      · Plan of Care expires:     · Plan of Care reviewed with:  patient(RN)   · SLP Follow-Up:  No       Discharge recommendations:  (needs  supervision at home, confusion noted)     Time Tracking:     SLP Treatment Date:   05/25/20  Speech Start Time:  1003  Speech Stop Time:  1014     Speech Total Time (min):  11 min    Billable Minutes: Eval Swallow and Oral Function 11    NURA Grijalva, CCC-SLP  05/25/2020

## 2020-05-25 NOTE — ASSESSMENT & PLAN NOTE
85 y.o. M with a PMHx of CAD s/p CABG (2006), HFrEF (a/p AICD, EF 45%), a fib on eliquis, HLD, HTN, CKD3 who presented to the ED on 5/23 with severe RUQ pain and R chest pain with deep breaths but no n/v/f/c, CT A/P obtained 5/22 with gallstones in gallbladder and CBD with no evident ductal dilatation, possible inflammation around pancreas as well. Notable labs on admission WBC 20.47, T bili 4.1, lipase WNL, covid negative. Surgery consulted for admission for cholecystectomy for acute cholecystitis. Started on zosyn 5/23 and blood cultures obtained, now growing enterococcus species. Vanc added 5/24. S/p ERCP with GI on 5/24 (no stent placement). Remains AFVSS with mild worsening leukocytosis.     Recommendations:   - Continue vancomycin  - Due to worsening renal function, recommend switching from zosyn to cefepime 1 g q24h  - Continue to follow cultures and sensitivities, we will follow along with you

## 2020-05-25 NOTE — PLAN OF CARE
Patient moved to ICU.  Granddaughter Nyla notified of change in medical condition.    Case management will continue to follow.

## 2020-05-25 NOTE — PROGRESS NOTES
Discussed with colleen Haywood.  Explained risk of hypotension, bleeding, arrhythmia, death during dialysis.  Consult Dr Corral for chintan catheter .

## 2020-05-25 NOTE — SIGNIFICANT EVENT
"A MET was called for worsening dyspnea and confusion. Patient oxygen saturation was 97% on 3 L. CXR with pulm edema. Nephrology on board and had recommended dialysis. Patient will be transfer to ICU for higher level of care.  Discussed again with patient's granddaughter, Nyla, who was tearful stating, "he lost his wife few years ago and has since been ready to go". Nyla reported patient is a DNR and does not want aggressive therapy. Patient had initially decline present hospitalization but was encourage by family to come to the hospital. Family wants to try dialysis and if patient continue to decline would like, to re-address goals of care as possible no aggressive treatment. Questions and concern were addressed. Family would like to come see patient if he declines. Emotional comfort provided.   "

## 2020-05-25 NOTE — OP NOTE
Operative Note       Surgery Date: 5/24/2020     Surgeon(s) and Role:  Jadiel chow    Pre-op Diagnosis:  Abnormal LFTs [R94.5]  Abnormal finding on GI tract imaging [R93.3]  RUQ pain [R10.11]    Post-op Diagnosis: Post-Op Diagnosis Codes:     * Abnormal LFTs [R94.5]     * Abnormal finding on GI tract imaging [R93.3]     * RUQ pain [R10.11]    Procedure: Insertion left subclavian Johann catheter    Anesthesia: General    Procedure in Detail/Findings:  Patient in supine position left side of the neck and chest was prepped draped normal sterile manner using ChloraPrep time-out was called site was confirmed area under left subclavian infiltrated using 1% xylocaine solution 18 gauge catheter needle introduced into the left internal is a left subclavian vein guidewire was positioned in the superior vena cava track was dilated vein dilator Johann catheter was then placed on the guidewire into the into the superior vena cava guidewire was removed catheter was sutured in place using 2 silk suture sterile gauze dressings was applied with Biopatch patient tolerated well will check chest x-ray and dialyze no intraop complication patient tolerated well will dialyze after chest x-ray    Estimated Blood Loss: 10 cc  Specimens (From admission, onward)    None        Implants: * No implants in log *           Disposition: PACU - hemodynamically stable.           Condition: Good    Attestation:  I performed the procedure.      Patient tolerated well will check chest xray and dialyze.

## 2020-05-25 NOTE — PLAN OF CARE
Patient with worsening renal; function, oliguric with bacteremia. Appreciate nephrology recommendation for early initiation of dialysis. Discussed with patient's granddaughter, Nyla and she noted okay to start and will reassess as treatment progresses. Family decision for HD communicated with nephrology

## 2020-05-25 NOTE — PLAN OF CARE
Patient is awake, alert but disoriented X 4. BP systolic has been between 90 - 100 mm of Hg. Remains on O2 3L/mt via nasal cannula. Had CT head as he was more confused, had CxR , EKG and lab works done. BNP is super elevated and Trop T elevated. MD aware of the findings. On NPO and IV fluids in progress as per MAR. Urinary duckworth in situ with NIL OUTPUT from 2 am. For Echo today. Nephrology to be contacted ASAP. Handed over to morning RN.

## 2020-05-25 NOTE — SUBJECTIVE & OBJECTIVE
Interval History: confuse, reported confusion started after ERCP. CT head -no acute findings  Blood cx with enterococcus resembling strep- continue Vancomycin, repeat blood cx, consult ID  Troponin elevated but stable trending -likely due to Shayla  awaits SLP eval.  Renal dose Eliquis at 2.5    Review of Systems   Cardiovascular: Negative for chest pain.   Gastrointestinal: Negative for abdominal distention, abdominal pain, diarrhea and rectal pain.   Genitourinary: Positive for decreased urine volume. Negative for frequency.   Musculoskeletal: Negative for arthralgias and myalgias.   Skin: Negative for rash.   Psychiatric/Behavioral: Positive for agitation and confusion.     Objective:     Vital Signs (Most Recent):  Temp: 97.1 °F (36.2 °C) (05/25/20 0516)  Pulse: 70 (05/25/20 0516)  Resp: 17 (05/25/20 0516)  BP: 99/60 (05/25/20 0516)  SpO2: 98 % (05/25/20 0516) Vital Signs (24h Range):  Temp:  [97.1 °F (36.2 °C)-98.6 °F (37 °C)] 97.1 °F (36.2 °C)  Pulse:  [69-90] 70  Resp:  [17-24] 17  SpO2:  [90 %-100 %] 98 %  BP: ()/(50-93) 99/60     Weight: 78.8 kg (173 lb 11.6 oz)  Body mass index is 26.41 kg/m².    Intake/Output Summary (Last 24 hours) at 5/25/2020 0751  Last data filed at 5/25/2020 0622  Gross per 24 hour   Intake 1618.75 ml   Output 300 ml   Net 1318.75 ml      Physical Exam   Constitutional: He is oriented to person, place, and time. He appears well-developed and well-nourished.   HENT:   Head: Normocephalic and atraumatic.   Neck: Neck supple.   Cardiovascular: Normal rate, regular rhythm and normal heart sounds. Exam reveals no gallop and no friction rub.   No murmur heard.  Pulmonary/Chest: Effort normal and breath sounds normal. He exhibits no tenderness.   Abdominal: Soft. He exhibits no distension. There is no tenderness.   Neurological: He is alert and oriented to person, place, and time.   Skin:   RLE with bruise, reported a fall prior to admission   Psychiatric:   confuse       Significant  Labs:   Bilirubin:   Recent Labs   Lab 05/21/20  1056 05/23/20  1618 05/24/20  0529 05/25/20  0450   BILITOT 2.5* 4.1* 4.1* 5.6*     Blood Culture:   Recent Labs   Lab 05/23/20  1650 05/23/20  1700   LABBLOO Gram stain vinh bottle: Gram positive cocci in chains resembling Strep   Results called to and read back by:Kathya Sanchez RN 05/24/2020  19:03  ENTEROCOCCUS SPECIES  Identification pending  For susceptibility see order #[id number]  * Gram stain vinh bottle: Gram positive cocci in chains resembling Strep   Results called to and read back by: Brenna Will RN 05/24/2020  10:57  ENTEROCOCCUS SPECIES  Identification and susceptibility pending  *     CBC:   Recent Labs   Lab 05/23/20 1618 05/24/20  0529 05/25/20  0450   WBC 20.47* 20.78* 22.90*   HGB 11.8* 11.5* 11.1*   HCT 37.6* 37.1* 35.2*   * 139* 151     CMP:   Recent Labs   Lab 05/23/20  1618 05/24/20  0529 05/24/20  2018 05/25/20  0450 05/25/20  0803    138   < > 138 136  136  136 136   K 4.1 5.4*   < > 4.7  4.7  4.5 5.0  5.0  5.0  5.0 5.2*  5.2*    106   < > 105 104  104  104 103   CO2 25 25   < > 20* 18*  18*  18* 20*   GLU 84 183*   < > 55* 113*  113*  113* 126*   BUN 51* 54*   < > 62* 68*  68*  68* 71*   CREATININE 2.37* 2.7*   < > 3.4* 3.8*  3.8*  3.8* 4.1*   CALCIUM 8.9 8.6*   < > 8.6* 8.4*  8.4*  8.4* 8.4*   PROT 7.4 6.7  --   --  6.4  --    ALBUMIN 4.1 3.0*  --   --  2.6*  --    BILITOT 4.1* 4.1*  --   --  5.6*  --    ALKPHOS 58 62  --   --  52*  --    AST 43 32  --   --  26  --    ALT 21 20  --   --  19  --    ANIONGAP 9 7*   < > 13 14  14  14 13   EGFRNONAA 24.1* 21*   < > 16* 14*  14*  14* 12*    < > = values in this interval not displayed.     Lipid Panel: No results for input(s): CHOL, HDL, LDLCALC, TRIG, CHOLHDL in the last 48 hours.  Urine Culture: No results for input(s): LABURIN in the last 48 hours.  Urine Studies:   Recent Labs   Lab 05/24/20  1026   COLORU Ann-Marie   APPEARANCEUA Hazy*   PHUR 5.0    SPECGRAV 1.025   PROTEINUA Trace*   GLUCUA Negative   KETONESU Trace*   BILIRUBINUA 1+*   OCCULTUA Negative   NITRITE Negative   UROBILINOGEN 1.0   LEUKOCYTESUR Negative       Significant Imaging: I have reviewed all pertinent imaging results/findings within the past 24 hours.

## 2020-05-25 NOTE — HPI
"Danyel Rangel is a 85 y.o. male who presents with right upper quadrant pain.  Patient reports this right upper quadrant pain is severe and unrelenting today.  He had a CT scan yesterday ordered by his primary doctor which showed gallstones.      Palliative medicine has been consulted for Emotional support/GOC discussion  Patient MET transferred to ICU. Spoke with grand daughter Nyla via phone for discussion. Discussed patient current disease trajectory. Patient requiring dialysis. Dialysis education provided. Nyla stated, "He is a very sweet man. He just lost his wife a few years ago. " Code status discussion. Full code vs DNR. "His wishes would to be to let him go and we don't want to him to suffer." Family goal is to continue treatment including dialysis but to make patient a DNR. Primary team aware. Thanks for the consult.   "

## 2020-05-25 NOTE — PROGRESS NOTES
ABG attempted x 3.  Unable to obtain arterial sample.  Both results were venous samples.  Messaged MD and RN aware.

## 2020-05-25 NOTE — NURSING
MET called 1420. Pt pulled out his L FA IV and tried to pull out his duckworth. No output since 220 this morning, disoriented to place, time, situation. Pt c/o of SOB on 3L o2, sats maintained 93-95%. Pt transferred to ICU

## 2020-05-25 NOTE — PROGRESS NOTES
"Surgery follow up  /65 (Patient Position: Lying)   Pulse 70   Temp 98.4 °F (36.9 °C) (Oral)   Resp 17   Ht 5' 8" (1.727 m)   Wt 78.8 kg (173 lb 11.6 oz)   SpO2 98%   BMI 26.41 kg/m²   I/O last 3 completed shifts:  In: 1618.8 [P.O.:75; I.V.:743.8; IV Piggyback:800]  Out: 300 [Urine:300]  No intake/output data recorded.  Minimal urine output  Recent Results (from the past 336 hour(s))   CBC auto differential    Collection Time: 05/25/20  4:50 AM   Result Value Ref Range    WBC 22.90 (H) 3.90 - 12.70 K/uL    Hemoglobin 11.1 (L) 14.0 - 18.0 g/dL    Hematocrit 35.2 (L) 40.0 - 54.0 %    Platelets 151 150 - 350 K/uL   CBC auto differential    Collection Time: 05/24/20  5:29 AM   Result Value Ref Range    WBC 20.78 (H) 3.90 - 12.70 K/uL    Hemoglobin 11.5 (L) 14.0 - 18.0 g/dL    Hematocrit 37.1 (L) 40.0 - 54.0 %    Platelets 139 (L) 150 - 350 K/uL   CBC auto differential    Collection Time: 05/23/20  4:18 PM   Result Value Ref Range    WBC 20.47 (H) 3.90 - 12.70 K/uL    Hemoglobin 11.8 (L) 14.0 - 18.0 g/dL    Hematocrit 37.6 (L) 40.0 - 54.0 %    Platelets 132 (L) 150 - 350 K/uL     Recent Results (from the past 336 hour(s))   Basic metabolic panel    Collection Time: 05/25/20  8:03 AM   Result Value Ref Range    Sodium 136 136 - 145 mmol/L    Potassium 5.2 (H) 3.5 - 5.1 mmol/L    Chloride 103 95 - 110 mmol/L    CO2 20 (L) 23 - 29 mmol/L    BUN, Bld 71 (H) 8 - 23 mg/dL    Creatinine 4.1 (H) 0.5 - 1.4 mg/dL    Calcium 8.4 (L) 8.7 - 10.5 mg/dL    Anion Gap 13 8 - 16 mmol/L   Basic metabolic panel    Collection Time: 05/25/20  4:50 AM   Result Value Ref Range    Sodium 136 136 - 145 mmol/L    Potassium 5.0 3.5 - 5.1 mmol/L    Chloride 104 95 - 110 mmol/L    CO2 18 (L) 23 - 29 mmol/L    BUN, Bld 68 (H) 8 - 23 mg/dL    Creatinine 3.8 (H) 0.5 - 1.4 mg/dL    Calcium 8.4 (L) 8.7 - 10.5 mg/dL    Anion Gap 14 8 - 16 mmol/L   Basic metabolic panel    Collection Time: 05/25/20  4:50 AM   Result Value Ref Range    Sodium 136 " 136 - 145 mmol/L    Potassium 5.0 3.5 - 5.1 mmol/L    Chloride 104 95 - 110 mmol/L    CO2 18 (L) 23 - 29 mmol/L    BUN, Bld 68 (H) 8 - 23 mg/dL    Creatinine 3.8 (H) 0.5 - 1.4 mg/dL    Calcium 8.4 (L) 8.7 - 10.5 mg/dL    Anion Gap 14 8 - 16 mmol/L   abdomen unchanged ,   Confused  Sepsis, s/p ercp , ID and nephro on board  Will hold surgery till medically cleared.

## 2020-05-25 NOTE — NURSING
Bladder scan performed shows 12 ml in bladder. Patient is very confused forgot how to swallow. Tried many times to drink water. Refused to drink. BNP and Troponin repeated as the previous sample was hemolyzed.

## 2020-05-25 NOTE — PLAN OF CARE
spoke with patient's granddaughter Nyla Perez [phone] and completed discharge assessment.  Nyla reported the patient lived with her and the patient's grandson Etienne Sapp [phone].    At baseline, Nyla reported the patient was independent with all adls and does not use any DME.  Patient fed himself, administered own medications, and drove himself to his appts.  Received his meds from mail order.    At discharge, patient will have help from both Etienne and Nyla. Patient will have ride home at discharge as well.       05/25/20 7946   Discharge Assessment   Assessment Type Discharge Planning Assessment   Confirmed/corrected address and phone number on facesheet? Yes   Assessment information obtained from? Caregiver   Expected Length of Stay (days) 5   Communicated expected length of stay with patient/caregiver yes   Prior to hospitilization cognitive status: Alert/Oriented   Prior to hospitalization functional status: Independent   Current cognitive status: Not Oriented to Person;Not Oriented to Place   Current Functional Status: Needs Assistance   Lives With grandchild(clayton)   Able to Return to Prior Arrangements yes   Is patient able to care for self after discharge? Unable to determine at this time (comments)   Who are your caregiver(s) and their phone number(s)?   (Etienne Sapp (grandson) [phone], Nyla Perez (granddaughter) [phone])   Patient's perception of discharge disposition admitted as an inpatient   Readmission Within the Last 30 Days no previous admission in last 30 days   Patient currently being followed by outpatient case management? No   Patient currently receives any other outside agency services? No   Equipment Currently Used at Home none   Do you have any problems affording any of your prescribed medications? No   Is the patient taking medications as prescribed? yes   Does the patient have transportation home? Yes   Transportation Anticipated family or friend  will provide   Does the patient receive services at the Coumadin Clinic? No   Discharge Plan A Home Health   Discharge Plan B Home with family   DME Needed Upon Discharge  none   Patient/Family in Agreement with Plan yes

## 2020-05-25 NOTE — EICU
Rounding (Video Assessment):  No    Intervention Initiated From:  Bedside    Areli Communicated with Bedside Nurse regarding:  Time-Out    Comments: elert received for time out prior to trialysis insertion.  Dr Corral @ bs for procedure.  Time out completed.  LSC Trialysis inserted.  CXR ordered.    Procedure end 9746

## 2020-05-25 NOTE — [employer] PATIENT INSTRUCTIONS
Discharge Summary/Instructions after an Endoscopic Procedure  Patient Name: Danyel Rangel  Patient MRN: [id number]  Patient YOB: 1935  [place], May 24, 2020  David Cox MD  Your health is very important to us during the Covid Crisis. Following your   procedure today, you will receive a daily text for 2 weeks asking about   signs or symptoms of Covid 19.  Please respond to this text when you   receive it so we can follow up and keep you as safe as possible.   RESTRICTIONS:  During your procedure today, you received medications for sedation.  These   medications may affect your judgment, balance and coordination.  Therefore,   for 24 hours, you have the following restrictions:   - DO NOT drive a car, operate machinery, make legal/financial decisions,   sign important papers or drink alcohol.    ACTIVITY:  Today: no heavy lifting, straining or running due to procedural   sedation/anesthesia.  The following day: return to full activity including work.  DIET:  Eat and drink normally unless instructed otherwise.     TREATMENT FOR COMMON SIDE EFFECTS:  - Mild abdominal pain, nausea, belching, bloating or excessive gas:  rest,   eat lightly and use a heating pad.  - Sore Throat: treat with throat lozenges and/or gargle with warm salt   water.  - Because air was used during the procedure, expelling large amounts of air   from your rectum or belching is normal.  - If a bowel prep was taken, you may not have a bowel movement for 1-3 days.    This is normal.  SYMPTOMS TO WATCH FOR AND REPORT TO YOUR PHYSICIAN:  1. Abdominal pain or bloating, other than gas cramps.  2. Chest pain.  3. Back pain.  4. Signs of infection such as: chills or fever occurring within 24 hours   after the procedure.  5. Rectal bleeding, which would show as bright red, maroon, or black stools.   (A tablespoon of blood from the rectum is not serious, especially if   hemorrhoids are present.)  6. Vomiting.  7. Weakness or dizziness.  GO DIRECTLY  TO THE NEAREST EMERGENCY ROOM IF YOU HAVE ANY OF THE FOLLOWING:      Difficulty breathing              Chills and/or fever over 101 F   Persistent vomiting and/or vomiting blood   Severe abdominal pain   Severe chest pain   Black, tarry stools   Bleeding- more than one tablespoon   Any other symptom or condition that you feel may need urgent attention  Your doctor recommends these additional instructions:  If any biopsies were taken, your doctors clinic will contact you in 1 to 2   weeks with any results.  - Return patient to [facility] for ongoing care.   - Resume previous diet.   - Continue present medications.   - Use a proton pump inhibitor PO BID for 2 weeks.   - Proceed with consideration of maya davenport  For questions, problems or results please call your physician - David Cox MD at Work:  [phone].  EMERGENCY PHONE NUMBER: [phone],  LAB RESULTS: [phone]  IF A COMPLICATION OR EMERGENCY SITUATION ARISES AND YOU ARE UNABLE TO REACH   YOUR PHYSICIAN - GO DIRECTLY TO THE EMERGENCY ROOM.  David Cox MD  5/24/2020 7:04:39 PM  This report has been verified and signed electronically.  [employer]

## 2020-05-25 NOTE — ANESTHESIA POSTPROCEDURE EVALUATION
Anesthesia Post Evaluation    Patient: Danyel Rangel    Procedure(s) Performed: Procedure(s) (LRB):  ERCP (ENDOSCOPIC RETROGRADE CHOLANGIOPANCREATOGRAPHY) (N/A)    Final Anesthesia Type: general    Patient location during evaluation: PACU  Patient participation: Yes- Able to Participate  Level of consciousness: awake and alert and agitated  Post-procedure vital signs: reviewed and stable  Pain management: adequate  Airway patency: patent  NI mitigation strategies: Multimodal analgesia  PONV status at discharge: No PONV      Cardiovascular status: blood pressure returned to baseline  Respiratory status: unassisted  Hydration status: euvolemic  Follow-up not needed.          Vitals Value Taken Time   /62 5/24/2020  4:21 PM   Temp 37 °C (98.6 °F) 5/24/2020  4:21 PM   Pulse 70 5/24/2020  5:53 PM   Resp 18 5/24/2020  5:53 PM   SpO2 94 % 5/24/2020  5:53 PM         No case tracking events are documented in the log.      Pain/Anthony Score: Pain Rating Prior to Med Admin: 9 (5/24/2020  2:51 AM)

## 2020-05-25 NOTE — HPI
85 y.o. M with a PMHx of CAD s/p CABG (2006), HFrEF (s/p AICD, EF 45%), a fib on eliquis, HLD, HTN, CKD3 who presented to the ED on 5/21 after a trip and fall, hit his head and sustained a laceration but CT head showed no acute abnormality. Discharged from ED, at the time VSS and no leukocytosis, but notably T. Bili 2.5. On 5/22 went to PCP and complained of diffuse abdominal pain/distension and urinary retention x 1 day, ordered CT A/P 5/22 which showed multiple gallstones in gallbladder and in CBD with no ductal dilitation. There was also subtle inflammatory changes adjacent to head of pancreas, can't exclude pancreatitis. On 5/23 presented to ED for severe RUQ abdominal pain and pleuritic chest pain on the R side, no nausea, vomiting, fevers, chills. Afebrile, VSS, WBC 22.9 (88% neutrophils, 0% bands), lipase 215, Cr 1.91, , covid negative. Surgery consulted, GI consulted for ERCP. Started on zosyn 5/23. Blood cultures obtained, now growing enterococcus species (susceptibilities pending) and vancomycin added 5/24. ID consulted for enterococcus bacteremia. Pt went for ERCP with GI 5/24 with stone extraction and sphincterotomy. 5/25 he had worsening confusion and repeat CT head negative for acute process. Kidney function worsening, nephrology consulted and plan for HD.     5/26 yesterday patient had increasing confusion and worsening kidney function and was transferred to ICU in the afternoon. He had trialysis line placed L subclavian, and was started on CRRT. UF rate turned down due to hypotension. Yesterday echo obtained, showed LVEF 45% and grade III DD. . Due to worsening renal function yesterday we recommended switching from vanc/zosyn to vanc/cefepime. Zosyn discontinued and cefepime added. WBC 22.9-->21.1. Afebrile.     5/27 Intermittent delirium-- ICU delirium vs related to sepsis vs drug-related. On CRRT. On vanc/cefepime. Afebrile with stable white count. T bili slowly up-trending 5.5-->6.2  with direct component of 2.6. Cr stable 1.5. Called micro lab and enterococcus species from blood cultures amp-sensitive.

## 2020-05-25 NOTE — PROGRESS NOTES
Pharmacist Renal Dose Adjustment Note    Danyel Rangel is a 85 y.o. male being treated with the medication zosyn    Patient Data:    Vital Signs (Most Recent):  Temp: 99.1 °F (37.3 °C) (05/25/20 0757)  Pulse: 73 (05/25/20 0825)  Resp: 18 (05/25/20 0825)  BP: (!) 99/59 (05/25/20 0757)  SpO2: 97 % (05/25/20 0825)   Vital Signs (72h Range):  Temp:  [97.1 °F (36.2 °C)-99.1 °F (37.3 °C)]   Pulse:  [69-90]   Resp:  [15-24]   BP: ()/()   SpO2:  [90 %-100 %]      Recent Labs   Lab 05/24/20 2018 05/25/20  0450 05/25/20  0803   CREATININE 3.4* 3.8*  3.8*  3.8* 4.1*     Serum creatinine: 4.1 mg/dL (H) 05/25/20 0803  Estimated creatinine clearance: 12.7 mL/min (A)    Medication:zosyn dose: 4.5gram frequency q8h will be changed to medication:zosyn dose:4.5gram frequency:q12h    Pharmacist's Name: Fito Wiley  Pharmacist's Extension: [phone]

## 2020-05-25 NOTE — SUBJECTIVE & OBJECTIVE
Past Medical History:   Diagnosis Date    Atrial fibrillation     Coronary artery disease     Encounter for blood transfusion     Hyperlipidemia     Hypertension        Past Surgical History:   Procedure Laterality Date    CARDIAC CATHETERIZATION  2016    CORONARY ARTERY BYPASS GRAFT  2006    ERCP N/A 5/24/2020    Procedure: ERCP (ENDOSCOPIC RETROGRADE CHOLANGIOPANCREATOGRAPHY);  Surgeon: David Cox MD;  Location: [facility];  Service: Endoscopy;  Laterality: N/A;    TOTAL KNEE ARTHROPLASTY Left 2010            Review of patient's allergies indicates:   Allergen Reactions    Xarelto [rivaroxaban] Anaphylaxis       Medications:  Medications Prior to Admission   Medication Sig    allopurinol (ZYLOPRIM) 300 MG tablet TAKE 1 TABLET EVERY DAY    apixaban (ELIQUIS) 5 mg Tab Take 1 tablet (5 mg total) by mouth 2 (two) times daily.    finasteride (PROSCAR) 5 mg tablet Take 1 tablet (5 mg total) by mouth once daily.    furosemide (LASIX) 20 MG tablet Take 1 tablet (20 mg total) by mouth 2 (two) times daily.    levothyroxine (SYNTHROID) 50 MCG tablet TAKE 1 TABLET EVERY DAY    metoprolol succinate (TOPROL-XL) 50 MG 24 hr tablet Take 1 tablet (50 mg total) by mouth once daily.    potassium chloride SA (K-DUR,KLOR-CON) 10 MEQ tablet TAKE 1 TABLET EVERY DAY    simvastatin (ZOCOR) 20 MG tablet Take 1 tablet (20 mg total) by mouth every evening.     Antibiotics (From admission, onward)    Start     Stop Route Frequency Ordered    05/25/20 2100  piperacillin-tazobactam 4.5 g in dextrose 5 % 100 mL IVPB (ready to mix system)      -- IV Every 12 hours (non-standard times) 05/25/20 1016    05/24/20 1159  vancomycin - pharmacy to dose  (vancomycin IVPB)      -- IV pharmacy to manage frequency 05/24/20 1059        Antifungals (From admission, onward)    None        Antivirals (From admission, onward)    None           Immunization History   Administered Date(s) Administered    Influenza - High Dose - PF (65 years  and older) 11/10/2016, 10/25/2018    Influenza - Quadrivalent - PF (6 months and older) 11/19/2019    Influenza Split 10/20/2015    Pneumococcal Conjugate - 13 Valent 11/10/2016    Tdap 05/21/2020       Family History     None        Social History     Socioeconomic History    Marital status:      Spouse name: Not on file    Number of children: Not on file    Years of education: Not on file    Highest education level: Not on file   Occupational History    Not on file   Social Needs    Financial resource strain: Not on file    Food insecurity:     Worry: Not on file     Inability: Not on file    Transportation needs:     Medical: Not on file     Non-medical: Not on file   Tobacco Use    Smoking status: Never Smoker    Smokeless tobacco: Never Used   Substance and Sexual Activity    Alcohol use: No    Drug use: No    Sexual activity: Not on file   Lifestyle    Physical activity:     Days per week: Not on file     Minutes per session: Not on file    Stress: Not on file   Relationships    Social connections:     Talks on phone: Not on file     Gets together: Not on file     Attends [employer] service: Not on file     Active member of club or organization: Not on file     Attends meetings of clubs or organizations: Not on file     Relationship status: Not on file   Other Topics Concern    Not on file   Social History Narrative    Not on file     Review of Systems   Constitutional: Negative for fever and unexpected weight change.   HENT: Negative for rhinorrhea and sore throat.    Respiratory: Positive for shortness of breath. Negative for cough.    Cardiovascular: Positive for chest pain.   Gastrointestinal: Positive for abdominal distention and abdominal pain (RUQ). Negative for blood in stool, constipation, diarrhea, nausea and vomiting.   Genitourinary: Positive for difficulty urinating. Negative for dysuria and hematuria.   Musculoskeletal: Negative for myalgias.   Skin: Negative for rash.    Neurological: Negative for seizures and syncope.   Psychiatric/Behavioral: Positive for confusion.     Objective:     Vital Signs (Most Recent):  Temp: 99.1 °F (37.3 °C) (05/25/20 0757)  Pulse: 75 (05/25/20 1108)  Resp: 18 (05/25/20 1108)  BP: (!) 99/59 (05/25/20 0757)  SpO2: 97 % (05/25/20 1108) Vital Signs (24h Range):  Temp:  [97.1 °F (36.2 °C)-99.1 °F (37.3 °C)] 99.1 °F (37.3 °C)  Pulse:  [69-90] 75  Resp:  [17-24] 18  SpO2:  [90 %-100 %] 97 %  BP: ()/(50-93) 99/59     Weight: 78.8 kg (173 lb 11.6 oz)  Body mass index is 26.41 kg/m².    Estimated Creatinine Clearance: 12.7 mL/min (A) (based on SCr of 4.1 mg/dL (H)).    Physical Exam    Significant Labs:   CBC:   Recent Labs   Lab 05/23/20 1618 05/24/20  0529 05/25/20  0450   WBC 20.47* 20.78* 22.90*   HGB 11.8* 11.5* 11.1*   HCT 37.6* 37.1* 35.2*   * 139* 151     CMP:   Recent Labs   Lab 05/23/20  1618 05/24/20  0529  05/24/20  2018 05/25/20  0450 05/25/20  0803    138   < > 138 136  136  136 136   K 4.1 5.4*   < > 4.7  4.7  4.5 5.0  5.0  5.0  5.0 5.2*  5.2*    106   < > 105 104  104  104 103   CO2 25 25   < > 20* 18*  18*  18* 20*   GLU 84 183*   < > 55* 113*  113*  113* 126*   BUN 51* 54*   < > 62* 68*  68*  68* 71*   CREATININE 2.37* 2.7*   < > 3.4* 3.8*  3.8*  3.8* 4.1*   CALCIUM 8.9 8.6*   < > 8.6* 8.4*  8.4*  8.4* 8.4*   PROT 7.4 6.7  --   --  6.4  --    ALBUMIN 4.1 3.0*  --   --  2.6*  --    BILITOT 4.1* 4.1*  --   --  5.6*  --    ALKPHOS 58 62  --   --  52*  --    AST 43 32  --   --  26  --    ALT 21 20  --   --  19  --    ANIONGAP 9 7*   < > 13 14  14  14 13   EGFRNONAA 24.1* 21*   < > 16* 14*  14*  14* 12*    < > = values in this interval not displayed.     Lipase:   Recent Labs   Lab 05/24/20  1534   LIPASE 30     Microbiology Results (last 7 days)     Procedure Component Value Units Date/Time    Blood culture [[id number]] Collected:  05/25/20 0911    Order Status:  Sent Specimen:  Blood Updated:   05/25/20 0911    Blood culture [[id number]]  (Abnormal) Collected:  05/23/20 1650    Order Status:  Completed Specimen:  Blood from Peripheral, Antecubital, Left Updated:  05/25/20 0740     Blood Culture, Routine Gram stain vinh bottle: Gram positive cocci in chains resembling Strep       Results called to and read back by:Kathya Sanchez RN 05/24/2020  19:03      ENTEROCOCCUS SPECIES  Identification pending  For susceptibility see order #[id number]      Blood culture [[id number]]  (Abnormal) Collected:  05/23/20 1700    Order Status:  Completed Specimen:  Blood from Peripheral, Antecubital, Right Updated:  05/25/20 0739     Blood Culture, Routine Gram stain vinh bottle: Gram positive cocci in chains resembling Strep       Results called to and read back by: Brenna Will RN 05/24/2020  10:57      ENTEROCOCCUS SPECIES  Identification and susceptibility pending          All pertinent labs within the past 24 hours have been reviewed.    Significant Imaging: I have reviewed all pertinent imaging results/findings within the past 24 hours.

## 2020-05-25 NOTE — TRANSFER OF CARE
"Anesthesia Transfer of Care Note    Patient: Danyel Rangel    Procedure(s) Performed: Procedure(s) (LRB):  ERCP (ENDOSCOPIC RETROGRADE CHOLANGIOPANCREATOGRAPHY) (N/A)    Patient location: PACU    Anesthesia Type: general    Transport from OR: Transported from OR on 2-3 L/min O2 by NC with adequate spontaneous ventilation    Post pain: adequate analgesia    Post assessment: no apparent anesthetic complications    Post vital signs: stable    Level of consciousness: awake, alert and oriented    Nausea/Vomiting: no nausea/vomiting    Complications: none    Transfer of care protocol was followed      Last vitals:   Visit Vitals  /62   Pulse 70   Temp 37 °C (98.6 °F)   Resp 18   Ht 5' 8" (1.727 m)   Wt 79.8 kg (175 lb 14.8 oz)   SpO2 (!) 94%   BMI 26.75 kg/m²     "

## 2020-05-25 NOTE — CONSULTS
"[facility]  Palliative Medicine  Consult Note    Patient Name: Danyel Rangel  MRN: [id number]  Admission Date: 5/23/2020  Hospital Length of Stay: 0 days  Code Status: DNR   Attending Provider: Nelida Mcdaniel*  Consulting Provider: Gia Woodard NP  Primary Care Physician: Pa Spence MD  Principal Problem:Cholecystitis without cholelithiasis    Patient information was obtained from relative(s), past medical records and ER records.      Inpatient consult to Palliative Care  Consult performed by: Gia Woodard NP  Consult ordered by: Nelida Mcdaniel MD        Assessment/Plan:     Goals of care, counseling/discussion  DNR  Continue treatment  Dialysis education   Will follow patient during this admission         Thank you for your consult. I will follow-up with patient. Please contact us if you have any additional questions.    Subjective:     HPI:   Danyel Rangel is a 85 y.o. male who presents with right upper quadrant pain.  Patient reports this right upper quadrant pain is severe and unrelenting today.  He had a CT scan yesterday ordered by his primary doctor which showed gallstones.      Palliative medicine has been consulted for Emotional support/GOC discussion  Patient MET transferred to ICU. Spoke with grand daughter Nyla via phone for discussion. Discussed patient current disease trajectory. Patient requiring dialysis. Dialysis education provided. Nyla stated, "He is a very sweet man. He just lost his wife a few years ago. " Code status discussion. Full code vs DNR. "His wishes would to be to let him go and we don't want to him to suffer." Family goal is to continue treatment including dialysis but to make patient a DNR. Primary team aware. Thanks for the consult.     Hospital Course:  No notes on file        Past Medical History:   Diagnosis Date    Atrial fibrillation     Coronary artery disease     Encounter for blood transfusion     Hyperlipidemia  "    Hypertension        Past Surgical History:   Procedure Laterality Date    CARDIAC CATHETERIZATION  2016    CORONARY ARTERY BYPASS GRAFT  2006    ERCP N/A 5/24/2020    Procedure: ERCP (ENDOSCOPIC RETROGRADE CHOLANGIOPANCREATOGRAPHY);  Surgeon: David Cox MD;  Location: [facility];  Service: Endoscopy;  Laterality: N/A;    TOTAL KNEE ARTHROPLASTY Left 2010            Review of patient's allergies indicates:   Allergen Reactions    Xarelto [rivaroxaban] Anaphylaxis       Medications:  Continuous Infusions:   sodium chloride 0.9%       Scheduled Meds:   albuterol-ipratropium  3 mL Nebulization Once    apixaban  2.5 mg Oral BID    docusate sodium  100 mg Oral BID    finasteride  5 mg Oral Daily    furosemide (LASIX) IV  40 mg Intravenous Once    levothyroxine  50 mcg Oral Before breakfast    metoprolol succinate  50 mg Oral Daily    piperacillin-tazobactam (ZOSYN) IVPB  4.5 g Intravenous Q12H    simvastatin  20 mg Oral QHS     PRN Meds:albuterol-ipratropium, dextrose 50 % in water (D50W), HYDROcodone-acetaminophen, magnesium sulfate IVPB, ondansetron, promethazine (PHENERGAN) IVPB, sodium phosphate IVPB, sodium phosphate IVPB, sodium phosphate IVPB, [COMPLETED] Pharmacy to dose Vancomycin consult **AND** vancomycin - pharmacy to dose    Family History     None        Tobacco Use    Smoking status: Never Smoker    Smokeless tobacco: Never Used   Substance and Sexual Activity    Alcohol use: No    Drug use: No    Sexual activity: Not on file       Review of Systems   Constitutional: Negative for fever and unexpected weight change.   HENT: Negative for rhinorrhea and sore throat.    Respiratory: Positive for shortness of breath. Negative for cough.    Cardiovascular: Positive for chest pain.   Gastrointestinal: Positive for abdominal distention and abdominal pain (RUQ). Negative for blood in stool, constipation, diarrhea, nausea and vomiting.   Genitourinary: Positive for difficulty urinating.  Negative for dysuria and hematuria.   Musculoskeletal: Negative for myalgias.   Skin: Negative for rash.   Neurological: Negative for seizures and syncope.   Psychiatric/Behavioral: Positive for confusion.     Objective:     Vital Signs (Most Recent):  Temp: 98.5 °F (36.9 °C) (05/25/20 1445)  Pulse: 71 (05/25/20 1445)  Resp: (!) 22 (05/25/20 1445)  BP: (!) 107/57 (05/25/20 1445)  SpO2: 97 % (05/25/20 1445) Vital Signs (24h Range):  Temp:  [97.1 °F (36.2 °C)-99.1 °F (37.3 °C)] 98.5 °F (36.9 °C)  Pulse:  [70-90] 71  Resp:  [17-32] 22  SpO2:  [90 %-100 %] 97 %  BP: ()/(50-93) 107/57     Weight: 81.7 kg (180 lb 1.9 oz)  Body mass index is 27.39 kg/m².    Review of Symptoms  Symptom Assessment (ESAS 0-10 scale)   ESAS 0 1 2 3 4 5 6 7 8 9 10   Pain              Dyspnea              Anxiety              Nausea              Depression               Anorexia              Fatigue              Insomnia              Restlessness               Agitation              CAM / Delirium __ --  ___+   Constipation     __ --  ___+   Diarrhea           __ --  ___+  Bowel Management Plan (BMP): Yes    Pain Assessment: yes  Performance Status: 50    ECOG Performance Status Grade: 2 - Ambulates, capable of self care only    Physical Exam   Constitutional: He is oriented to person, place, and time. He appears well-developed and well-nourished.   HENT:   Head: Normocephalic and atraumatic.   Neck: Neck supple.   Cardiovascular: Normal rate, regular rhythm and normal heart sounds. Exam reveals no gallop and no friction rub.   No murmur heard.  Pulmonary/Chest: Effort normal and breath sounds normal. He exhibits no tenderness.   Abdominal: Soft. He exhibits no distension. There is no tenderness.   Neurological: He is alert and oriented to person, place, and time.   Skin:   RLE with bruise, reported a fall prior to admission   Psychiatric:   confuse       Significant Labs: All pertinent labs within the past 24 hours have been  reviewed.  CBC:   Recent Labs   Lab 05/25/20  0450   WBC 22.90*   HGB 11.1*   HCT 35.2*   MCV 99*        BMP:  Recent Labs   Lab 05/25/20  1225   *   *   K 5.4*  5.4*      CO2 17*   BUN 76*   CREATININE 4.4*   CALCIUM 8.4*     LFT:  Lab Results   Component Value Date    AST 26 05/25/2020    ALKPHOS 52 (L) 05/25/2020    BILITOT 5.6 (H) 05/25/2020     Albumin:   Albumin   Date Value Ref Range Status   05/25/2020 2.6 (L) 3.5 - 5.2 g/dL Final     Protein:   Total Protein   Date Value Ref Range Status   05/25/2020 6.4 6.0 - 8.4 g/dL Final     Lactic acid:   No results found for: LACTATE    Significant Imaging: I have reviewed all pertinent imaging results/findings within the past 24 hours.    Advance Care Planning   Advanced Directives::  Living Will: No  LaPOST: No  Do Not Resuscitate Status: Yes  Medical Power of : Yes.  Decision-Making Capacity: Family answered questions, Patient unable to communicate due to disease severity/cognitive impairment       Living Arrangements: Lives with family    Psychosocial/Cultural:  Patient's most important priorities:  MET    Patient's biggest concerns/fears:    MET  Previous death/end of life care history:    MET  Patient's goals/hopes:    MET  Spiritual:     F- Corrina and Belief: MET    I - Importance: MET  .  C - Community: MET  A - Address in Care:MET    Recommendations:  Continue medical treatment  Code status: DNR  Dialysis      Gia Woodard, MSN, APRN, NP-C   Palliative Medicine   [facility]  [phone] or [phone]        >50% of  60  min visit spent in chart review, face to face discussion of goals of care with patient, family, symptom assessment, coordination of care and emotional support.

## 2020-05-25 NOTE — NURSING
Received him from Endoscopy after ERCP. He is awake, alert and oriented. Blood for BMP drawn. Settled him in bed. Denies pain but says he is exhausted.

## 2020-05-25 NOTE — PROGRESS NOTES
Progress Note  Nephrology      Consult Requested By: Nelida Mcdaniel*      SUBJECTIVE:     Overnight events  Patient is a 85 y.o. male     Patient Active Problem List   Diagnosis    Chronic atrial fibrillation    HTN (hypertension), benign    Hyperlipidemia    AICD (automatic cardioverter/defibrillator) present    Coronary artery disease involving native coronary artery of native heart without angina pectoris    Hypothyroidism    Abnormal LFTs    Trigger index finger of right hand    S/P CABG (coronary artery bypass graft)    Senile purpura    CKD (chronic kidney disease), stage III    Benign prostatic hyperplasia with weak urinary stream    Chronic systolic congestive heart failure    Chronic gout without tophus    Cholecystitis with cholelithiasis    Cholecystitis without cholelithiasis    Chronic kidney disease, stage IV (severe)    Hyperkalemia    Leukocytosis    JOI (acute kidney injury)    Abnormal finding on GI tract imaging    RUQ pain    Elevated troponin    Elevated brain natriuretic peptide (BNP) level    Dysphagia    Decreased urine output    Shortness of breath    Bacteremia     Past Medical History:   Diagnosis Date    Atrial fibrillation     Coronary artery disease     Encounter for blood transfusion     Hyperlipidemia     Hypertension               OBJECTIVE:     Vitals:    05/25/20 0825 05/25/20 1108 05/25/20 1137 05/25/20 1155   BP:   129/65    BP Location:       Patient Position:   Lying    Pulse: 73 75 70 70   Resp: 18 18 17    Temp:   98.4 °F (36.9 °C)    TempSrc:   Oral    SpO2: 97% 97% 98%    Weight:       Height:           Temp: 98.4 °F (36.9 °C) (05/25/20 1137)  Pulse: 70 (05/25/20 1155)  Resp: 17 (05/25/20 1137)  BP: 129/65 (05/25/20 1137)  SpO2: 98 % (05/25/20 1137)              Medications:   albuterol-ipratropium  3 mL Nebulization Once    apixaban  2.5 mg Oral BID    docusate sodium  100 mg Oral BID    finasteride  5 mg Oral Daily     levothyroxine  50 mcg Oral Before breakfast    metoprolol succinate  50 mg Oral Daily    piperacillin-tazobactam (ZOSYN) IVPB  4.5 g Intravenous Q12H    simvastatin  20 mg Oral QHS      lactated ringers 75 mL/hr at 05/25/20 0307               Physical Exam:  General appearance: weak  No nausea  Abdominal discomfort  Lungs: diminished breath sounds  Heart: Pulse 70  /65  Abdomen: distended  Extremities: no edema    Laboratory:  ABG  Labs reviewed  Recent Results (from the past 336 hour(s))   Basic metabolic panel    Collection Time: 05/25/20 12:25 PM   Result Value Ref Range    Sodium 134 (L) 136 - 145 mmol/L    Potassium 5.4 (H) 3.5 - 5.1 mmol/L    Chloride 102 95 - 110 mmol/L    CO2 17 (L) 23 - 29 mmol/L    BUN, Bld 76 (H) 8 - 23 mg/dL    Creatinine 4.4 (H) 0.5 - 1.4 mg/dL    Calcium 8.4 (L) 8.7 - 10.5 mg/dL    Anion Gap 15 8 - 16 mmol/L   Basic metabolic panel    Collection Time: 05/25/20  8:03 AM   Result Value Ref Range    Sodium 136 136 - 145 mmol/L    Potassium 5.2 (H) 3.5 - 5.1 mmol/L    Chloride 103 95 - 110 mmol/L    CO2 20 (L) 23 - 29 mmol/L    BUN, Bld 71 (H) 8 - 23 mg/dL    Creatinine 4.1 (H) 0.5 - 1.4 mg/dL    Calcium 8.4 (L) 8.7 - 10.5 mg/dL    Anion Gap 13 8 - 16 mmol/L   Basic metabolic panel    Collection Time: 05/25/20  4:50 AM   Result Value Ref Range    Sodium 136 136 - 145 mmol/L    Potassium 5.0 3.5 - 5.1 mmol/L    Chloride 104 95 - 110 mmol/L    CO2 18 (L) 23 - 29 mmol/L    BUN, Bld 68 (H) 8 - 23 mg/dL    Creatinine 3.8 (H) 0.5 - 1.4 mg/dL    Calcium 8.4 (L) 8.7 - 10.5 mg/dL    Anion Gap 14 8 - 16 mmol/L     Recent Results (from the past 336 hour(s))   CBC auto differential    Collection Time: 05/25/20  4:50 AM   Result Value Ref Range    WBC 22.90 (H) 3.90 - 12.70 K/uL    Hemoglobin 11.1 (L) 14.0 - 18.0 g/dL    Hematocrit 35.2 (L) 40.0 - 54.0 %    Platelets 151 150 - 350 K/uL   CBC auto differential    Collection Time: 05/24/20  5:29 AM   Result Value Ref Range    WBC 20.78 (H) 3.90  - 12.70 K/uL    Hemoglobin 11.5 (L) 14.0 - 18.0 g/dL    Hematocrit 37.1 (L) 40.0 - 54.0 %    Platelets 139 (L) 150 - 350 K/uL   CBC auto differential    Collection Time: 05/23/20  4:18 PM   Result Value Ref Range    WBC 20.47 (H) 3.90 - 12.70 K/uL    Hemoglobin 11.8 (L) 14.0 - 18.0 g/dL    Hematocrit 37.6 (L) 40.0 - 54.0 %    Platelets 132 (L) 150 - 350 K/uL     Urinalysis  No results for input(s): COLORU, CLARITYU, SPECGRAV, PHUR, PROTEINUA, GLUCOSEU, BILIRUBINCON, BLOODU, WBCU, RBCU, BACTERIA, MUCUS, NITRITE, LEUKOCYTESUR, UROBILINOGEN, HYALINECASTS in the last 24 hours.    Diagnostic Results:  X-Ray: Reviewed  US: Reviewed  Echo: Reviewed  ACCESS    ASSESSMENT/PLAN:   Blood Gram positive cocci in chains resembling Strep    Cholecystitis     JOI/ CKD 3b-4   cc - 24 h  UO 5 cc today  Creatinine 4.4  BUN 76  Hyponatremia  K 5.4  Metabolic bone disease  Gap Metabolic acidosis  /65  CXR 5/24      Lungs are significantly hypoinflated.  Left basilar atelectatic changes are seen.  Heart is stable in size.  No evidence of pneumothorax or large pleural effusion.      Discussed with Dr Niko Robledo daily  I and O

## 2020-05-25 NOTE — ASSESSMENT & PLAN NOTE
CKD (chronic kidney disease), stage III  Hyperkalemia  Avoid nephrotoxic medication  Strict input/ output  Renally dose of medication  Gentle rehydration  Treat hyperk  Consult nephrology- appreciates rec's  Kidney USS with bilateral perinephric stranding.  continue Zosyn

## 2020-05-25 NOTE — PLAN OF CARE
VIRTUAL NURSE:  Cued into patient's room.  Patient not responding to introduction and permission inquiry.  Patient resting comfortably in bed with eyes closed; NC in place. respirations even and unlabored.  No distress noted.    Labs, notes, and orders reviewed.

## 2020-05-25 NOTE — PLAN OF CARE
Patient confused.  left voicemail for patient's grandson Etienne Sapp [phone] requesting a call back.

## 2020-05-26 NOTE — ASSESSMENT & PLAN NOTE
85 y.o. M with a PMHx of CAD s/p CABG (2006), HFrEF (s/p AICD, EF 45%), a fib on eliquis, HLD, HTN, CKD3 who presented to the ED on 5/23 with severe RUQ pain and R chest pain with deep breaths but no n/v/f/c, CT A/P obtained 5/22 with gallstones in gallbladder and CBD with no evident ductal dilatation, possible inflammation around pancreas as well. Notable labs on admission WBC 20.47, T bili 4.1, lipase WNL, covid negative. Started on zosyn 5/23 and blood cultures obtained, now growing enterococcus species, susceptibilities pending. Vanc added 5/24. S/p ERCP with GI on 5/24 (no stent placement). 5/26 ID consulted and due to worsening renal function recommended switching from vanc/zosyn to vanc/cefepime. On 5/26 zosyn discontinued and cefepime added. On 5/26 transferred to ICU for worsening confusion and started on CRRT.     Recommendations:   - Continue vancomycin (currently held as trough 17.9) and cefepime  - Continue to follow cultures and sensitivities, we will follow along with you

## 2020-05-26 NOTE — ASSESSMENT & PLAN NOTE
- s/p ERCP with sphincterotomy and stone extraction 5/24  - worsening leukocytosis, blood cx with enterococcus  - renal failure is concerning  - no purulent drainage noted from cbd  - monitor lfts, transaminases normal; fractionate bili in am  - monitor clinically  - on abx

## 2020-05-26 NOTE — PLAN OF CARE
During shift, patient was agitated and seemingly confused.  After medicating, pt got rest.  Pt awakes frequently and needed to be reoriented.  Pt attempted to get out of bed but with redirection, patient laid down.  Pt's blood pressure holding stable at this time s/p 500 cc NS bolus.  Pt's spo2 stable on 5L NC. Will continue to monitor

## 2020-05-26 NOTE — NURSING
Pt remains agitated but much calmer at this time.  Pt is allowing wires to be adjusted and patient to be adjusted in bed.  Pt continues to refuse and PO medications.  Message sent to MD in Epic.  RN sitting by door to keep eyes on patient and to re-orient.  VSS at this time.  Will continue to monitor

## 2020-05-26 NOTE — NURSING
eICU called for consult on low BP of 70s/50s.  Awaiting orders at this time.  Pt rouses to physical stimuli at this time.

## 2020-05-26 NOTE — PROGRESS NOTES
Progress Note  Nephrology      Consult Requested By: Janak Kinney DO      SUBJECTIVE:     Overnight events  Patient is a 85 y.o. male     Patient Active Problem List   Diagnosis    Chronic atrial fibrillation    HTN (hypertension), benign    Hyperlipidemia    AICD (automatic cardioverter/defibrillator) present    Coronary artery disease involving native coronary artery of native heart without angina pectoris    Hypothyroidism    Abnormal LFTs    Trigger index finger of right hand    S/P CABG (coronary artery bypass graft)    Senile purpura    CKD (chronic kidney disease), stage III    Benign prostatic hyperplasia with weak urinary stream    Chronic systolic congestive heart failure    Chronic gout without tophus    Cholecystitis with cholelithiasis    Cholecystitis without cholelithiasis    Chronic kidney disease, stage IV (severe)    Hyperkalemia    Leukocytosis    JOI (acute kidney injury)    Abnormal finding on GI tract imaging    RUQ pain    Elevated troponin    Elevated brain natriuretic peptide (BNP) level    Dysphagia    Decreased urine output    Shortness of breath    Bacteremia    Palliative care encounter    Goals of care, counseling/discussion    Counseling regarding advance care planning and goals of care    Sepsis     Past Medical History:   Diagnosis Date    Atrial fibrillation     Coronary artery disease     Encounter for blood transfusion     Hyperlipidemia     Hypertension               OBJECTIVE:     Vitals:    05/26/20 1100 05/26/20 1130 05/26/20 1138 05/26/20 1200   BP: (!) 97/56 97/66  (!) 105/53   BP Location:       Patient Position:       Pulse: 70 70 69 70   Resp: (!) 21 16 (!) 30 (!) 34   Temp:   98.1 °F (36.7 °C)    TempSrc:   Oral    SpO2: 97% (!) 94% (!) 91% 95%   Weight:       Height:           Temp: 98.1 °F (36.7 °C) (05/26/20 1138)  Pulse: 70 (05/26/20 1200)  Resp: (!) 34 (05/26/20 1200)  BP: (!) 105/53 (05/26/20 1200)  SpO2: 95 % (05/26/20  1200)    Date 05/26/20 0700 - 05/27/20 0659   Shift 5055-8152 8665-2829 9131-1933 24 Hour Total   INTAKE   IV Piggyback 250   250   Shift Total(mL/kg) 250(3.1)   250(3.1)   OUTPUT   Urine(mL/kg/hr) 0   0   Other 13   13   Shift Total(mL/kg) 13(0.2)   13(0.2)   Weight (kg) 81.7 81.7 81.7 81.7             Medications:   apixaban  2.5 mg Oral BID    ceFEPime (MAXIPIME) IVPB  1 g Intravenous Q24H    docusate sodium  100 mg Oral BID    finasteride  5 mg Oral Daily    levothyroxine  50 mcg Oral Before breakfast    metoprolol succinate  50 mg Oral Daily    simvastatin  20 mg Oral QHS    sodium phosphate IVPB  15 mmol Intravenous Once    vancomycin (VANCOCIN) IVPB  1,250 mg Intravenous Once      sodium chloride 0.9%                 Physical Exam:  General appearance: NAD  Lungs: diminished breath sounds  Heart: Pulse 70  Abdomen: distended  Extremities:no  edema  Laboratory:  ABG  Labs reviewed  Recent Results (from the past 336 hour(s))   Basic metabolic panel    Collection Time: 05/26/20  8:45 AM   Result Value Ref Range    Sodium 138 136 - 145 mmol/L    Potassium 4.6 3.5 - 5.1 mmol/L    Chloride 104 95 - 110 mmol/L    CO2 25 23 - 29 mmol/L    BUN, Bld 39 (H) 8 - 23 mg/dL    Creatinine 2.1 (H) 0.5 - 1.4 mg/dL    Calcium 8.0 (L) 8.7 - 10.5 mg/dL    Anion Gap 9 8 - 16 mmol/L   Basic metabolic panel    Collection Time: 05/26/20  4:14 AM   Result Value Ref Range    Sodium 135 (L) 136 - 145 mmol/L    Potassium 4.9 3.5 - 5.1 mmol/L    Chloride 103 95 - 110 mmol/L    CO2 23 23 - 29 mmol/L    BUN, Bld 45 (H) 8 - 23 mg/dL    Creatinine 2.3 (H) 0.5 - 1.4 mg/dL    Calcium 8.1 (L) 8.7 - 10.5 mg/dL    Anion Gap 9 8 - 16 mmol/L   Basic metabolic panel    Collection Time: 05/25/20 11:25 PM   Result Value Ref Range    Sodium 135 (L) 136 - 145 mmol/L    Potassium 4.8 3.5 - 5.1 mmol/L    Chloride 103 95 - 110 mmol/L    CO2 21 (L) 23 - 29 mmol/L    BUN, Bld 56 (H) 8 - 23 mg/dL    Creatinine 2.9 (H) 0.5 - 1.4 mg/dL    Calcium 8.4  (L) 8.7 - 10.5 mg/dL    Anion Gap 11 8 - 16 mmol/L     Recent Results (from the past 336 hour(s))   CBC auto differential    Collection Time: 05/26/20  4:14 AM   Result Value Ref Range    WBC 21.10 (H) 3.90 - 12.70 K/uL    Hemoglobin 10.1 (L) 14.0 - 18.0 g/dL    Hematocrit 31.5 (L) 40.0 - 54.0 %    Platelets 162 150 - 350 K/uL   CBC auto differential    Collection Time: 05/25/20  4:50 AM   Result Value Ref Range    WBC 22.90 (H) 3.90 - 12.70 K/uL    Hemoglobin 11.1 (L) 14.0 - 18.0 g/dL    Hematocrit 35.2 (L) 40.0 - 54.0 %    Platelets 151 150 - 350 K/uL   CBC auto differential    Collection Time: 05/24/20  5:29 AM   Result Value Ref Range    WBC 20.78 (H) 3.90 - 12.70 K/uL    Hemoglobin 11.5 (L) 14.0 - 18.0 g/dL    Hematocrit 37.1 (L) 40.0 - 54.0 %    Platelets 139 (L) 150 - 350 K/uL     Urinalysis  No results for input(s): COLORU, CLARITYU, SPECGRAV, PHUR, PROTEINUA, GLUCOSEU, BILIRUBINCON, BLOODU, WBCU, RBCU, BACTERIA, MUCUS, NITRITE, LEUKOCYTESUR, UROBILINOGEN, HYALINECASTS in the last 24 hours.    Diagnostic Results:  X-Ray: Reviewed  US: Reviewed  Echo: Reviewed  ACCESS    ASSESSMENT/PLAN:   JOI/ CKD 3b-4  Metabolic bone disease  Replace phosphorus  Hb 11.1  Hypotension  Blood pressure 105/53  Poor nutrition  CRRT

## 2020-05-26 NOTE — NURSING
"Patient cursing at RN calling RN "Mother fucker, get the fuck out of my room."  Pt agitated and trying to kick staff.  MD aware.  Attempting to give PRN haldol.  Pt refusing any care to be done at this time.  Pt also acting scared when this RN walks into room and becomes defensive as if this RN was going to hit patient.  Another nurse was used to see if patient would respond better to situation.  Patient states "I sometimes stay with my grand son but he ain't never done anything to me before."  Sarita RN was able to medicate patient as PRN order.  Charting near patient's doorway for line of site on patient.  Esitter in room as well at this time.  Will continue to monitor patient and situation.  "

## 2020-05-26 NOTE — PROGRESS NOTES
"Surgery follow up  /61   Pulse 70   Temp 98.3 °F (36.8 °C) (Axillary)   Resp 19   Ht 5' 8" (1.727 m)   Wt 81.7 kg (180 lb 1.9 oz)   SpO2 98%   BMI 27.39 kg/m²   I/O last 3 completed shifts:  In: 2298.8 [P.O.:75; I.V.:1423.8; IV Piggyback:800]  Out: 407 [Urine:115; Other:292]  I/O this shift:  In: 500 [IV Piggyback:500]  Out: 536 [Urine:5; Other:531]      Recent Results (from the past 336 hour(s))   CBC auto differential    Collection Time: 05/26/20  4:14 AM   Result Value Ref Range    WBC 21.10 (H) 3.90 - 12.70 K/uL    Hemoglobin 10.1 (L) 14.0 - 18.0 g/dL    Hematocrit 31.5 (L) 40.0 - 54.0 %    Platelets 162 150 - 350 K/uL   CBC auto differential    Collection Time: 05/25/20  4:50 AM   Result Value Ref Range    WBC 22.90 (H) 3.90 - 12.70 K/uL    Hemoglobin 11.1 (L) 14.0 - 18.0 g/dL    Hematocrit 35.2 (L) 40.0 - 54.0 %    Platelets 151 150 - 350 K/uL   CBC auto differential    Collection Time: 05/24/20  5:29 AM   Result Value Ref Range    WBC 20.78 (H) 3.90 - 12.70 K/uL    Hemoglobin 11.5 (L) 14.0 - 18.0 g/dL    Hematocrit 37.1 (L) 40.0 - 54.0 %    Platelets 139 (L) 150 - 350 K/uL     Recent Results (from the past 336 hour(s))   Basic metabolic panel    Collection Time: 05/26/20  2:23 PM   Result Value Ref Range    Sodium 137 136 - 145 mmol/L    Potassium 4.9 3.5 - 5.1 mmol/L    Chloride 103 95 - 110 mmol/L    CO2 23 23 - 29 mmol/L    BUN, Bld 34 (H) 8 - 23 mg/dL    Creatinine 1.8 (H) 0.5 - 1.4 mg/dL    Calcium 8.0 (L) 8.7 - 10.5 mg/dL    Anion Gap 11 8 - 16 mmol/L   Basic metabolic panel    Collection Time: 05/26/20  8:45 AM   Result Value Ref Range    Sodium 138 136 - 145 mmol/L    Potassium 4.6 3.5 - 5.1 mmol/L    Chloride 104 95 - 110 mmol/L    CO2 25 23 - 29 mmol/L    BUN, Bld 39 (H) 8 - 23 mg/dL    Creatinine 2.1 (H) 0.5 - 1.4 mg/dL    Calcium 8.0 (L) 8.7 - 10.5 mg/dL    Anion Gap 9 8 - 16 mmol/L   Basic metabolic panel    Collection Time: 05/26/20  4:14 AM   Result Value Ref Range    Sodium 135 " (L) 136 - 145 mmol/L    Potassium 4.9 3.5 - 5.1 mmol/L    Chloride 103 95 - 110 mmol/L    CO2 23 23 - 29 mmol/L    BUN, Bld 45 (H) 8 - 23 mg/dL    Creatinine 2.3 (H) 0.5 - 1.4 mg/dL    Calcium 8.1 (L) 8.7 - 10.5 mg/dL    Anion Gap 9 8 - 16 mmol/L     More awake less confusion, on dialysis, no urine out put , continue present treatment.

## 2020-05-26 NOTE — NURSING
Orders received for 500 NS bolus and to turn CRRT UF rate to 0 for now.  Will administer ordered fluids.

## 2020-05-26 NOTE — SUBJECTIVE & OBJECTIVE
Subjective:     Interval History: Worsening sob, transferred to ICU. No vomiting. No melena. [facility] ERCP yesterday    Review of Systems   Constitutional: Positive for fatigue. Negative for unexpected weight change.   Respiratory: Positive for shortness of breath. Negative for cough.    Cardiovascular: Negative for chest pain and palpitations.   Gastrointestinal: Negative for anal bleeding and blood in stool.     Objective:     Vital Signs (Most Recent):  Temp: 98.5 °F (36.9 °C) (05/25/20 1445)  Pulse: 72 (05/25/20 1834)  Resp: 19 (05/25/20 1834)  BP: 106/79 (05/25/20 1834)  SpO2: (!) 94 % (05/25/20 1957) Vital Signs (24h Range):  Temp:  [97.1 °F (36.2 °C)-99.1 °F (37.3 °C)] 98.5 °F (36.9 °C)  Pulse:  [] 72  Resp:  [17-33] 19  SpO2:  [67 %-100 %] 94 %  BP: ()/(50-84) 106/79     Weight: 81.7 kg (180 lb 1.9 oz) (05/25/20 1445)  Body mass index is 27.39 kg/m².      Intake/Output Summary (Last 24 hours) at 5/25/2020 2055  Last data filed at 5/25/2020 1808  Gross per 24 hour   Intake 1243.75 ml   Output 81 ml   Net 1162.75 ml       Lines/Drains/Airways     Central Venous Catheter Line            Trialysis (Dialysis) Catheter 05/25/20 1538 left subclavian less than 1 day          Drain                 Urethral Catheter 05/24/20 1720 Straight-tip 14 Fr. 1 day          Peripheral Intravenous Line                 Peripheral IV - Single Lumen 05/24/20 1426 20 G Posterior;Right Hand 1 day                Physical Exam   Constitutional: He appears well-developed.   HENT:   Head: Normocephalic and atraumatic.   Eyes: Conjunctivae are normal. Scleral icterus is present.   Cardiovascular: Normal rate. Exam reveals no friction rub.   Pulmonary/Chest: No stridor. He exhibits no tenderness.   Abdominal: Soft. He exhibits no distension. There is no guarding.   Musculoskeletal: He exhibits no tenderness or deformity.   Neurological: He is alert.   Skin: He is not diaphoretic.   Nursing note and vitals  reviewed.      Significant Labs:  CMP:   Recent Labs   Lab 05/25/20  0450  05/25/20  1506 05/25/20 1957   *  113*  113*   < > 176*  173* 130*   CALCIUM 8.4*  8.4*  8.4*   < > 8.4*  8.3* 8.0*   ALBUMIN 2.6*  --  2.5*  --    PROT 6.4  --   --   --      136  136   < > 135*  134* 135*   K 5.0  5.0  5.0  5.0   < > 5.9*  5.8* 5.0   CO2 18*  18*  18*   < > 20*  19* 22*     104  104   < > 102  101 103   BUN 68*  68*  68*   < > 78*  77* 64*   CREATININE 3.8*  3.8*  3.8*   < > 4.4*  4.5* 3.4*   ALKPHOS 52*  --   --   --    ALT 19  --   --   --    AST 26  --   --   --    BILITOT 5.6*  --   --   --     < > = values in this interval not displayed.         Significant Imaging:  Imaging results within the past 24 hours have been reviewed.

## 2020-05-26 NOTE — PROGRESS NOTES
Pharmacokinetic Assessment Follow Up: IV Vancomycin    Vancomycin serum concentration assessment(s):    The random level was drawn correctly and can be used to guide therapy at this time. The measurement is within the desired definitive target range of 15 to 20 mcg/mL.    Vancomycin Regimen Plan:    CRRT initiated on patient today, will continue to dose by levels at this time. Redose with 1250mg x1 today and obtain repeat random on 5/27 @1200.    Drug levels (last 3 results):  Recent Labs   Lab Result Units 05/25/20  1225 05/26/20  0414   Vancomycin, Random ug/mL 19.4 17.9       Pharmacy will continue to follow and monitor vancomycin.    Please contact pharmacy at extension [phone] for questions regarding this assessment.    Thank you for the consult,   Dyana Coles       Patient brief summary:  Danyel Rangel is a 85 y.o. male initiated on antimicrobial therapy with IV Vancomycin for treatment of bacteremia    The patient's current regimen is dose by levels    Drug Allergies:   Review of patient's allergies indicates:   Allergen Reactions    Xarelto [rivaroxaban] Anaphylaxis       Actual Body Weight:   81kg    Renal Function:   Estimated Creatinine Clearance: 24.9 mL/min (A) (based on SCr of 2.1 mg/dL (H)).,     Dialysis Method (if applicable):  CRRT    CBC (last 72 hours):  Recent Labs   Lab Result Units 05/23/20  1618 05/24/20  0529 05/25/20  0450 05/26/20  0414   WBC K/uL 20.47* 20.78* 22.90* 21.10*   Hemoglobin g/dL 11.8* 11.5* 11.1* 10.1*   Hematocrit % 37.6* 37.1* 35.2* 31.5*   Platelets K/uL 132* 139* 151 162   Gran% % 83.2* 91.0* 88.0* 89.5*   Lymph% % 7.4* 4.0* 4.0* 3.2*   Mono% % 8.6 1.0* 3.0* 5.6   Eosinophil% % 0.0 0.0 0.0 0.9   Basophil% % 0.1 0.0 0.0 0.0   Differential Method  Automated Manual Manual Automated       Metabolic Panel (last 72 hours):  Recent Labs   Lab Result Units 05/23/20  1618 05/24/20  0529 05/24/20  1026 05/24/20  1256 05/24/20  1534 05/24/20  2018 05/25/20  0450 05/25/20  0803  05/25/20  1225 05/25/20  1506 05/25/20  1957 05/25/20  2325 05/26/20  0414 05/26/20  0845   Sodium mmol/L 140 138  --   --  133* 138 136  136  136 136 134* 135*  134* 135* 135*  136 135*  135*  135* 138   Sodium Urine Random mmol/L  --   --   --  <20*  --   --   --   --   --   --   --   --   --   --    Potassium mmol/L 4.1 5.4*  --   --  5.6*  5.6* 4.7  4.7  4.5 5.0  5.0  5.0  5.0 5.2*  5.2* 5.4*  5.4* 5.9*  5.8* 5.0 4.8  4.9 4.9  4.9  4.9 4.6   Chloride mmol/L 106 106  --   --  101 105 104  104  104 103 102 102  101 103 103  103 103  103  103 104   CO2 mmol/L 25 25  --   --  23 20* 18*  18*  18* 20* 17* 20*  19* 22* 21*  22* 23  23  23 25   Glucose mg/dL 84 183*  --   --  329* 55* 113*  113*  113* 126* 130* 176*  173* 130* 110  111* 101  101  101 102   Glucose, UA   --   --  Negative  --   --   --   --   --   --   --   --   --   --   --    BUN, Bld mg/dL 51* 54*  --   --  60* 62* 68*  68*  68* 71* 76* 78*  77* 64* 56*  57* 45*  45*  45* 39*   Creatinine mg/dL 2.37* 2.7*  --   --  3.1* 3.4* 3.8*  3.8*  3.8* 4.1* 4.4* 4.4*  4.5* 3.4* 2.9*  2.9* 2.3*  2.3*  2.3* 2.1*   Albumin g/dL 4.1 3.0*  --   --   --   --  2.6*  --   --  2.5*  --  2.4* 2.3*  2.3*  --    Total Bilirubin mg/dL 4.1* 4.1*  --   --   --   --  5.6*  --   --   --   --   --  5.5*  --    Alkaline Phosphatase U/L 58 62  --   --   --   --  52*  --   --   --   --   --  51*  --    AST U/L 43 32  --   --   --   --  26  --   --   --   --   --  43*  --    ALT U/L 21 20  --   --   --   --  19  --   --   --   --   --  22  --    Magnesium mg/dL  --   --   --   --   --   --   --   --   --  2.3  --  2.3  --  2.4   Phosphorus mg/dL  --   --   --   --   --   --   --   --   --  6.4*  --  4.2 3.5  --        Vancomycin Administrations:  vancomycin given in the last 96 hours                     vancomycin in dextrose 5 % 1 gram/250 mL IVPB 1,000 mg (mg) 1,000 mg New Bag 05/25/20 8803    vancomycin (VANCOCIN) 2,000 mg in  dextrose 5 % 500 mL IVPB (mg) 2,000 mg New Bag 05/24/20 1204                      Microbiologic Results:  Microbiology Results (last 7 days)       Procedure Component Value Units Date/Time    Blood culture [[id number]]  (Abnormal) Collected:  05/23/20 1650    Order Status:  Completed Specimen:  Blood from Peripheral, Antecubital, Left Updated:  05/26/20 1141     Blood Culture, Routine Gram stain vinh bottle: Gram positive cocci in chains resembling Strep       Results called to and read back by:Kathya Sanchez RN 05/24/2020  19:03      ENTEROCOCCUS FAECALIS  For susceptibility see order #[id number]      Blood culture [[id number]]  (Abnormal) Collected:  05/23/20 1700    Order Status:  Completed Specimen:  Blood from Peripheral, Antecubital, Right Updated:  05/26/20 1141     Blood Culture, Routine Gram stain vinh bottle: Gram positive cocci in chains resembling Strep       Results called to and read back by: Brenna Will RN 05/24/2020  10:57      ENTEROCOCCUS FAECALIS  Susceptibility pending      Blood culture [[id number]] Collected:  05/25/20 0911    Order Status:  Completed Specimen:  Blood Updated:  05/26/20 0115     Blood Culture, Routine No Growth to date

## 2020-05-26 NOTE — NURSING
Discussed hypotension (MAP 60-65) with Dr. Andrade. Dropped BFR and fluid temperature on CRRT. MD to order one time dose of albumin.

## 2020-05-26 NOTE — NURSING
CASE MANAGEMENT NOTE  Patient in the ICU, LOS 1 day for Cholecystitis, however, more medical problems, Patient not able to give information. Patient getting HD at this time.    Jessie Duke  Director Case Management

## 2020-05-26 NOTE — SUBJECTIVE & OBJECTIVE
Interval History: On 4 L NC satting 98%. Mental status continues to wax and wane, remains oriented x 3.    Review of Systems   Constitutional: Negative for fever and unexpected weight change.   HENT: Negative for rhinorrhea and sore throat.    Respiratory: Positive for shortness of breath. Negative for cough.    Cardiovascular: Positive for chest pain.   Gastrointestinal: Positive for abdominal distention and abdominal pain (RUQ). Negative for blood in stool, constipation, diarrhea, nausea and vomiting.   Genitourinary: Positive for difficulty urinating. Negative for dysuria and hematuria.   Musculoskeletal: Negative for myalgias.   Skin: Negative for rash.   Neurological: Negative for seizures and syncope.   Psychiatric/Behavioral: Positive for confusion.     Objective:     Vital Signs (Most Recent):  Temp: 97.7 °F (36.5 °C) (05/26/20 0745)  Pulse: 69 (05/26/20 1138)  Resp: (!) 30 (05/26/20 1138)  BP: 97/66 (05/26/20 1130)  SpO2: (!) 91 % (05/26/20 1138) Vital Signs (24h Range):  Temp:  [97.4 °F (36.3 °C)-98.7 °F (37.1 °C)] 97.7 °F (36.5 °C)  Pulse:  [] 69  Resp:  [10-39] 30  SpO2:  [67 %-100 %] 91 %  BP: ()/() 97/66     Weight: 81.7 kg (180 lb 1.9 oz)  Body mass index is 27.39 kg/m².    Estimated Creatinine Clearance: 24.9 mL/min (A) (based on SCr of 2.1 mg/dL (H)).    Physical Exam    Gen: awake, alert, NAD  HEENT: PERRLA, EOMi, no oropharyngeal exudate, JVP 7 cm  CV: RRR, no m/r/g  Resp: Breathing comfortably on 3 L NC  Abd: Soft, ttp RUQ, non-distended  Ext: No cyanosis or edema  Skin: No rashes or skin breakdown noted  Neuro: A&O x 3, no abnormal tone    Significant Labs:   CBC:   Recent Labs   Lab 05/25/20  0450 05/26/20  0414   WBC 22.90* 21.10*   HGB 11.1* 10.1*   HCT 35.2* 31.5*    162     CMP:   Recent Labs   Lab 05/25/20  0450  05/25/20  1506  05/25/20  2325 05/26/20  0414 05/26/20  0845     136  136   < > 135*  134*   < > 135*  136 135*  135*  135* 138   K 5.0  5.0   5.0  5.0   < > 5.9*  5.8*   < > 4.8  4.9 4.9  4.9  4.9 4.6     104  104   < > 102  101   < > 103  103 103  103  103 104   CO2 18*  18*  18*   < > 20*  19*   < > 21*  22* 23  23  23 25   *  113*  113*   < > 176*  173*   < > 110  111* 101  101  101 102   BUN 68*  68*  68*   < > 78*  77*   < > 56*  57* 45*  45*  45* 39*   CREATININE 3.8*  3.8*  3.8*   < > 4.4*  4.5*   < > 2.9*  2.9* 2.3*  2.3*  2.3* 2.1*   CALCIUM 8.4*  8.4*  8.4*   < > 8.4*  8.3*   < > 8.4*  8.5* 8.1*  8.1*  8.1* 8.0*   PROT 6.4  --   --   --   --  6.0  --    ALBUMIN 2.6*  --  2.5*  --  2.4* 2.3*  2.3*  --    BILITOT 5.6*  --   --   --   --  5.5*  --    ALKPHOS 52*  --   --   --   --  51*  --    AST 26  --   --   --   --  43*  --    ALT 19  --   --   --   --  22  --    ANIONGAP 14  14  14   < > 13  14   < > 11  11 9  9  9 9   EGFRNONAA 14*  14*  14*   < > 11*  11*   < > 19*  19* 25*  25*  25* 28*    < > = values in this interval not displayed.     Microbiology Results (last 7 days)     Procedure Component Value Units Date/Time    Blood culture [[id number]]  (Abnormal) Collected:  05/23/20 1650    Order Status:  Completed Specimen:  Blood from Peripheral, Antecubital, Left Updated:  05/26/20 0833     Blood Culture, Routine Gram stain vinh bottle: Gram positive cocci in chains resembling Strep       Results called to and read back by:Kathya Sanchez RN 05/24/2020  19:03      ENTEROCOCCUS FAECALIS  For susceptibility see order #[id number]      Blood culture [[id number]]  (Abnormal) Collected:  05/23/20 1700    Order Status:  Completed Specimen:  Blood from Peripheral, Antecubital, Right Updated:  05/26/20 0833     Blood Culture, Routine Gram stain vinh bottle: Gram positive cocci in chains resembling Strep       Results called to and read back by: Brenna Will RN 05/24/2020  10:57      ENTEROCOCCUS FAECALIS  Susceptibility pending      Blood culture [[id number]] Collected:  05/25/20 0911     Order Status:  Completed Specimen:  Blood Updated:  05/26/20 0115     Blood Culture, Routine No Growth to date        All pertinent labs within the past 24 hours have been reviewed.    Significant Imaging: I have reviewed all pertinent imaging results/findings within the past 24 hours.

## 2020-05-26 NOTE — PROGRESS NOTES
[facility]  Infectious Disease  Progress Note    Patient Name: Danyel Rangel  MRN: [id number]  Admission Date: 5/23/2020  Length of Stay: 1 days  Attending Physician: Janak Kinney DO  Primary Care Provider: Pa Spence MD    Isolation Status: No active isolations  Assessment/Plan:      Bacteremia  85 y.o. M with a PMHx of CAD s/p CABG (2006), HFrEF (s/p AICD, EF 45%), a fib on eliquis, HLD, HTN, CKD3 who presented to the ED on 5/23 with severe RUQ pain and R chest pain with deep breaths but no n/v/f/c, CT A/P obtained 5/22 with gallstones in gallbladder and CBD with no evident ductal dilatation, possible inflammation around pancreas as well. Notable labs on admission WBC 20.47, T bili 4.1, lipase WNL, covid negative. Started on zosyn 5/23 and blood cultures obtained, now growing enterococcus species, susceptibilities pending. Vanc added 5/24. S/p ERCP with GI on 5/24 (no stent placement). 5/26 ID consulted and due to worsening renal function recommended switching from vanc/zosyn to vanc/cefepime. On 5/26 zosyn discontinued and cefepime added. On 5/26 transferred to ICU for worsening confusion and started on CRRT.     Recommendations:   - Continue vancomycin/cefepime  - Continue to follow cultures and sensitivities, we will follow along with you      Thank you for your consult. We will continue to follow.    Kelli Jimenez, LISA-MERY  Infectious Disease  [facility]    Subjective:     Principal Problem:Cholecystitis without cholelithiasis    HPI: 85 y.o. M with a PMHx of CAD s/p CABG (2006), HFrEF (s/p AICD, EF 45%), a fib on eliquis, HLD, HTN, CKD3 who presented to the ED on 5/21 after a trip and fall, hit his head and sustained a laceration but CT head showed no acute abnormality. Discharged from ED, at the time VSS and no leukocytosis, but notably T. Bili 2.5. On 5/22 went to PCP and complained of diffuse abdominal pain/distension and urinary retention x 1 day, ordered CT A/P  5/22 which showed multiple gallstones in gallbladder and in CBD with no ductal dilitation. There was also subtle inflammatory changes adjacent to head of pancreas, can't exclude pancreatitis. On 5/23 presented to ED for severe RUQ abdominal pain and pleuritic chest pain on the R side, no nausea, vomiting, fevers, chills. Afebrile, VSS, WBC 22.9 (88% neutrophils, 0% bands), lipase 215, Cr 1.91, , covid negative. Surgery consulted, GI consulted for ERCP. Started on zosyn 5/23. Blood cultures obtained, now growing enterococcus species (susceptibilities pending) and vancomycin added 5/24. ID consulted for enterococcus bacteremia. Pt went for ERCP with GI 5/24. 5/25 he had worsening confusion and repeat CT head negative for acute process. Kidney function worsening, nephrology consulted and plan for HD.     5/26 yesterday patient had increasing confusion and worsening kidney function and was transferred to ICU in the afternoon. He had trialysis line placed L subclavian, and was started on CRRT. UF rate turned down due to hypotension. Yesterday echo obtained, showed LVEF 45% and grade III DD. . Due to worsening renal function yesterday we recommended switching from vanc/zosyn to vanc/cefepime. Zosyn discontinued and cefepime added. WBC 22.9-->21.1. Afebrile.       Interval History: On 4 L NC satting 98%. Mental status improving.    Review of Systems   Constitutional: Negative for fever and unexpected weight change.   HENT: Negative for rhinorrhea and sore throat.    Respiratory: Positive for shortness of breath. Negative for cough.    Cardiovascular: Positive for chest pain.   Gastrointestinal: Positive for abdominal distention and abdominal pain (RUQ). Negative for blood in stool, constipation, diarrhea, nausea and vomiting.   Genitourinary: Positive for difficulty urinating. Negative for dysuria and hematuria.   Musculoskeletal: Negative for myalgias.   Skin: Negative for rash.   Neurological: Negative for  seizures and syncope.   Psychiatric/Behavioral: Positive for confusion.     Objective:     Vital Signs (Most Recent):  Temp: 97.7 °F (36.5 °C) (05/26/20 0745)  Pulse: 69 (05/26/20 1138)  Resp: (!) 30 (05/26/20 1138)  BP: 97/66 (05/26/20 1130)  SpO2: (!) 91 % (05/26/20 1138) Vital Signs (24h Range):  Temp:  [97.4 °F (36.3 °C)-98.7 °F (37.1 °C)] 97.7 °F (36.5 °C)  Pulse:  [] 69  Resp:  [10-39] 30  SpO2:  [67 %-100 %] 91 %  BP: ()/() 97/66     Weight: 81.7 kg (180 lb 1.9 oz)  Body mass index is 27.39 kg/m².    Estimated Creatinine Clearance: 24.9 mL/min (A) (based on SCr of 2.1 mg/dL (H)).    Physical Exam    Gen: awake, alert, NAD  HEENT: PERRLA, EOMi, no oropharyngeal exudate, JVP 7 cm, LIJ trialysis line in place  CV: RRR, no m/r/g  Resp: Breathing comfortably on 4 L NC  Abd: Soft, ttp RUQ, non-distended  Ext: No cyanosis or edema  Skin: No rashes or skin breakdown noted  Neuro: A&O x 3, no abnormal tone    Significant Labs:   CBC:   Recent Labs   Lab 05/25/20  0450 05/26/20  0414   WBC 22.90* 21.10*   HGB 11.1* 10.1*   HCT 35.2* 31.5*    162     CMP:   Recent Labs   Lab 05/25/20  0450  05/25/20  1506  05/25/20  2325 05/26/20  0414 05/26/20  0845     136  136   < > 135*  134*   < > 135*  136 135*  135*  135* 138   K 5.0  5.0  5.0  5.0   < > 5.9*  5.8*   < > 4.8  4.9 4.9  4.9  4.9 4.6     104  104   < > 102  101   < > 103  103 103  103  103 104   CO2 18*  18*  18*   < > 20*  19*   < > 21*  22* 23  23  23 25   *  113*  113*   < > 176*  173*   < > 110  111* 101  101  101 102   BUN 68*  68*  68*   < > 78*  77*   < > 56*  57* 45*  45*  45* 39*   CREATININE 3.8*  3.8*  3.8*   < > 4.4*  4.5*   < > 2.9*  2.9* 2.3*  2.3*  2.3* 2.1*   CALCIUM 8.4*  8.4*  8.4*   < > 8.4*  8.3*   < > 8.4*  8.5* 8.1*  8.1*  8.1* 8.0*   PROT 6.4  --   --   --   --  6.0  --    ALBUMIN 2.6*  --  2.5*  --  2.4* 2.3*  2.3*  --    BILITOT 5.6*  --   --   --    --  5.5*  --    ALKPHOS 52*  --   --   --   --  51*  --    AST 26  --   --   --   --  43*  --    ALT 19  --   --   --   --  22  --    ANIONGAP 14  14  14   < > 13  14   < > 11  11 9  9  9 9   EGFRNONAA 14*  14*  14*   < > 11*  11*   < > 19*  19* 25*  25*  25* 28*    < > = values in this interval not displayed.     Microbiology Results (last 7 days)     Procedure Component Value Units Date/Time    Blood culture [[id number]]  (Abnormal) Collected:  05/23/20 1650    Order Status:  Completed Specimen:  Blood from Peripheral, Antecubital, Left Updated:  05/26/20 0833     Blood Culture, Routine Gram stain vinh bottle: Gram positive cocci in chains resembling Strep       Results called to and read back by:Kathya Sanchez RN 05/24/2020  19:03      ENTEROCOCCUS FAECALIS  For susceptibility see order #[id number]      Blood culture [[id number]]  (Abnormal) Collected:  05/23/20 1700    Order Status:  Completed Specimen:  Blood from Peripheral, Antecubital, Right Updated:  05/26/20 0833     Blood Culture, Routine Gram stain vinh bottle: Gram positive cocci in chains resembling Strep       Results called to and read back by: Brenna Will RN 05/24/2020  10:57      ENTEROCOCCUS FAECALIS  Susceptibility pending      Blood culture [[id number]] Collected:  05/25/20 0911    Order Status:  Completed Specimen:  Blood Updated:  05/26/20 0115     Blood Culture, Routine No Growth to date        All pertinent labs within the past 24 hours have been reviewed.    Significant Imaging: I have reviewed all pertinent imaging results/findings within the past 24 hours.

## 2020-05-27 NOTE — PROGRESS NOTES
[facility]  Gastroenterology  Progress Note    Patient Name: Danyel Rangel  MRN: [id number]  Admission Date: 5/23/2020  Hospital Length of Stay: 2 days  Code Status: DNR   Attending Provider: Janak Kinney DO  Consulting Provider: Janice Phelps MD  Primary Care Physician: Pa Spence MD  Principal Problem: Cholecystitis without cholelithiasis      Subjective:     Interval History: More alert today, mildly tachypneic. Quite talkative though thought process altered. Afebrile.     Review of Systems   Unable to perform ROS: Mental status change     Objective:     Vital Signs (Most Recent):  Temp: 97.3 °F (36.3 °C) (05/27/20 0727)  Pulse: 71 (05/27/20 0830)  Resp: (!) 33 (05/27/20 0830)  BP: (!) 101/58 (05/27/20 0800)  SpO2: 99 % (05/27/20 0830) Vital Signs (24h Range):  Temp:  [97.3 °F (36.3 °C)-98.3 °F (36.8 °C)] 97.3 °F (36.3 °C)  Pulse:  [] 71  Resp:  [15-70] 33  SpO2:  [74 %-100 %] 99 %  BP: ()/(50-79) 101/58     Weight: 81.7 kg (180 lb 1.9 oz) (05/25/20 1445)  Body mass index is 27.39 kg/m².      Intake/Output Summary (Last 24 hours) at 5/27/2020 0849  Last data filed at 5/27/2020 0607  Gross per 24 hour   Intake 1100 ml   Output 1147 ml   Net -47 ml       Lines/Drains/Airways     Central Venous Catheter Line            Trialysis (Dialysis) Catheter 05/25/20 1538 left subclavian 1 day          Drain                 Urethral Catheter 05/24/20 1720 Straight-tip 14 Fr. 2 days                Physical Exam   Constitutional: He appears well-developed.   HENT:   Head: Normocephalic and atraumatic.   Eyes: Conjunctivae are normal. Scleral icterus is present.   Cardiovascular: Normal rate. Exam reveals no friction rub.   Pulmonary/Chest: No stridor. He exhibits no tenderness.   Tachypneic   Abdominal: Soft. He exhibits no distension. There is no guarding.   Musculoskeletal: He exhibits no tenderness or deformity.   Neurological: He is alert.   Skin: He is not diaphoretic.   +ecchymoses    Psychiatric:   Altered thought content   Nursing note and vitals reviewed.      Significant Labs:  CBC:   Recent Labs   Lab 05/26/20  0414 05/27/20  0327   WBC 21.10* 21.95*   HGB 10.1* 9.5*   HCT 31.5* 29.4*    154         Significant Imaging:  Imaging results within the past 24 hours have been reviewed.    Assessment/Plan:     Abnormal finding on GI tract imaging  - s/p ERCP with sphincterotomy and stone extraction 5/24  - no purulent drainage noted from cbd  - monitor lfts  - tb rising, though indirect predominant - may have component of sepsis related   - add direct bili to todays labs  - monitor clinically  - on abx        Thank you for your consult. I will follow-up with patient. Please contact us if you have any additional questions.    Janice Phelps MD  Gastroenterology  [facility]

## 2020-05-27 NOTE — SUBJECTIVE & OBJECTIVE
Subjective:     Interval History: Seen/examined this afternoon, somnolent. On crrt    Review of Systems   Unable to perform ROS: Mental status change     Objective:     Vital Signs (Most Recent):  Temp: 98.3 °F (36.8 °C) (05/26/20 1512)  Pulse: 70 (05/26/20 1904)  Resp: (!) 26 (05/26/20 1904)  BP: 113/61 (05/26/20 1800)  SpO2: 98 % (05/26/20 1904) Vital Signs (24h Range):  Temp:  [97.4 °F (36.3 °C)-98.3 °F (36.8 °C)] 98.3 °F (36.8 °C)  Pulse:  [66-85] 70  Resp:  [10-34] 26  SpO2:  [87 %-100 %] 98 %  BP: ()/() 113/61     Weight: 81.7 kg (180 lb 1.9 oz) (05/25/20 1445)  Body mass index is 27.39 kg/m².      Intake/Output Summary (Last 24 hours) at 5/26/2020 2057  Last data filed at 5/26/2020 1800  Gross per 24 hour   Intake 1355 ml   Output 789 ml   Net 566 ml       Lines/Drains/Airways     Central Venous Catheter Line            Trialysis (Dialysis) Catheter 05/25/20 1538 left subclavian 1 day          Drain                 Urethral Catheter 05/24/20 1720 Straight-tip 14 Fr. 2 days                Physical Exam   Constitutional: He appears well-developed.   HENT:   Head: Normocephalic and atraumatic.   Eyes: Conjunctivae are normal. Scleral icterus is present.   Cardiovascular: Normal rate. Exam reveals no friction rub.   Pulmonary/Chest: No stridor. He exhibits no tenderness.   Abdominal: Soft. He exhibits no distension. There is no guarding.   Musculoskeletal: He exhibits no tenderness or deformity.   Neurological:   Somnolent   Skin: He is not diaphoretic.   Nursing note and vitals reviewed.      Significant Labs:  CMP:   Recent Labs   Lab 05/26/20  0414  05/26/20  1423     101  101   < > 114*  114*   CALCIUM 8.1*  8.1*  8.1*   < > 8.0*  8.0*   ALBUMIN 2.3*  2.3*  --  2.4*   PROT 6.0  --   --    *  135*  135*   < > 137  137   K 4.9  4.9  4.9   < > 4.9  4.9   CO2 23  23  23   < > 23  23     103  103   < > 103  103   BUN 45*  45*  45*   < > 34*  34*   CREATININE  2.3*  2.3*  2.3*   < > 1.8*  1.8*   ALKPHOS 51*  --   --    ALT 22  --   --    AST 43*  --   --    BILITOT 5.5*  --   --     < > = values in this interval not displayed.         Significant Imaging:  Imaging results within the past 24 hours have been reviewed.

## 2020-05-27 NOTE — PROGRESS NOTES
[facility] Hospital Medicine  Progress Note    Patient Name: Danyel Rangel  MRN: [id number]  Patient Class: IP- Inpatient   Admission Date: 5/23/2020  Length of Stay: 2 days  Attending Physician: Janak Kinney DO  Primary Care Provider: Pa Spence MD        Subjective:     Principal Problem:Cholecystitis without cholelithiasis        HPI:  Danyel Rangel is an 84 y/o M with PMH of Afib, CAD, HTN, and HLD admitted by surgery with cholecystitis without cholelithiasis and SOB. Noted RUQ pain with deep breath denies nausea, chills fever or vomiting. Hospital medicine has been consulted for medical management.    Overview/Hospital Course:  5/26 pt is awake and oriented, he is on CRRT given his low BP, feeling cold, ordering warming blanket  5/27 the pt still have the blood cultures, not finalized with sensitivity results, will continue current abx. WBC still elevated to 21K  Brought the family to visit with him, grandaughter and grandson.  Discussed with nephrology,  The plan is to continue current managemetn without escalation for 2 days,  On Friday, family will come here again and decide possibly on home hospice if no improvement.    Interval History: on CRRT,  Continue current POC, no escalation  Review of Systems   Constitutional: Positive for activity change, chills and fatigue. Negative for fever.   Respiratory: Negative for cough and shortness of breath.    Cardiovascular: Negative for chest pain.   Genitourinary: Negative for hematuria.   Neurological: Negative for seizures and headaches.   Psychiatric/Behavioral: Positive for agitation and confusion.     Objective:     Vital Signs (Most Recent):  Temp: 97.9 °F (36.6 °C) (05/27/20 1110)  Pulse: 70 (05/27/20 1408)  Resp: (!) 33 (05/27/20 1408)  BP: (!) 88/51 (05/27/20 1408)  SpO2: 100 % (05/27/20 1408) Vital Signs (24h Range):  Temp:  [97.3 °F (36.3 °C)-98.3 °F (36.8 °C)] 97.9 °F (36.6 °C)  Pulse:  [] 70  Resp:  [14-70] 33  SpO2:  [74  %-100 %] 100 %  BP: ()/(49-79) 88/51     Weight: 81.7 kg (180 lb 1.9 oz)  Body mass index is 27.39 kg/m².    Intake/Output Summary (Last 24 hours) at 5/27/2020 1427  Last data filed at 5/27/2020 1414  Gross per 24 hour   Intake 900 ml   Output 1595 ml   Net -695 ml      Physical Exam   Constitutional: He appears well-developed and well-nourished.   HENT:   Head: Atraumatic.   Pulmonary/Chest: Effort normal.   Abdominal: Soft.   Neurological: He is alert.   Psychiatric: He has a normal mood and affect.   Nursing note and vitals reviewed.      Significant Labs:   Recent Labs   Lab 05/25/20 0450 05/26/20 0414 05/27/20 0327   WBC 22.90* 21.10* 21.95*   HGB 11.1* 10.1* 9.5*   HCT 35.2* 31.5* 29.4*    162 154     Recent Labs   Lab 05/24/20  1534  05/26/20  2122 05/26/20  2205 05/27/20  0327 05/27/20  0801 05/27/20  1128   *   < > 136  --  137  137  --  136   K 5.6*  5.6*   < > 4.6  --  4.7  4.7  --  4.7      < > 101  --  102  102  --  102   CO2 23   < > 26  --  24  24  --  23   BUN 60*   < > 28*  --  25*  25*  --  28*   CREATININE 3.1*   < > 1.5*  --  1.4  1.4  --  1.5*   *   < > 125*  --  126*  126*  --  117*   CALCIUM 8.3*   < > 8.1*  --  8.1*  8.1*  --  8.0*   MG  --    < >  --  2.4  --  2.5 2.5   PHOS  --    < > 3.8  --  4.0  --  4.2   LIPASE 30  --   --   --   --   --   --     < > = values in this interval not displayed.     Recent Labs   Lab 05/21/20  1056  05/25/20 0450 05/26/20 0414 05/26/20  2122 05/27/20  0327 05/27/20  1128   ALKPHOS 60   < > 52*  --  51*  --   --  56  --    ALT 16   < > 19  --  22  --   --  22  --    AST 27   < > 26  --  43*  --   --  40  --    ALBUMIN 3.8   < > 2.6*   < > 2.3*  2.3*   < > 2.4* 2.4*  2.4* 2.4*   PROT 6.6   < > 6.4  --  6.0  --   --  5.9*  --    BILITOT 2.5*   < > 5.6*  --  5.5*  --   --  6.2*  --    INR 1.6*  --   --   --  1.1  --   --   --   --     < > = values in this interval not displayed.      Recent Labs      05/25/20  0117 05/25/20  0450 05/25/20  1225   TROPONINI 0.093* 0.094* 0.115*     Recent Labs   Lab 05/24/20  1524 05/24/20  2308 05/25/20  0121 05/25/20  1134 05/25/20  1417 05/26/20  2211   POCTGLUCOSE 141* 65* 156* 140* 161* 126*     Hemoglobin A1C   Date Value Ref Range Status   04/26/2018 5.5 4.0 - 5.6 % Final     Comment:     According to ADA guidelines, hemoglobin A1c <7.0% represents  optimal control in non-pregnant diabetic patients. Different  metrics may apply to specific patient populations.   Standards of Medical Care in Diabetes-2016.  For the purpose of screening for the presence of diabetes:  <5.7%     Consistent with the absence of diabetes  5.7-6.4%  Consistent with increasing risk for diabetes   (prediabetes)  >or=6.5%  Consistent with diabetes  Currently, no consensus exists for use of hemoglobin A1c  for diagnosis of diabetes for children.  This Hemoglobin A1c assay has significant interference with fetal   hemoglobin   (HbF). The results are invalid for patients with abnormal amounts of   HbF,   including those with known Hereditary Persistence   of Fetal Hemoglobin. Heterozygous hemoglobin variants (HbAS, HbAC,   HbAD, HbAE, HbA2) do not significantly interfere with this assay;   however, presence of multiple variants in a sample may impact the %   interference.         No results for input(s): COLORU, CLARITYU, SPECGRAV, PHUR, PROTEINUA, GLUCOSEU, BLOODU, WBCU, RBCU, BACTERIA, MUCUS in the last 24 hours.    Invalid input(s):  [facility]    Microbiology Results (last 7 days)     Procedure Component Value Units Date/Time    Blood culture [[id number]]  (Abnormal) Collected:  05/23/20 1650    Order Status:  Completed Specimen:  Blood from Peripheral, Antecubital, Left Updated:  05/27/20 0928     Blood Culture, Routine Gram stain vinh bottle: Gram positive cocci in chains resembling Strep       Results called to and read back by:Kathya Sanchez RN 05/24/2020  19:03      ENTEROCOCCUS FAECALIS  For  susceptibility see order #[id number]      Blood culture [[id number]]  (Abnormal) Collected:  05/23/20 1700    Order Status:  Completed Specimen:  Blood from Peripheral, Antecubital, Right Updated:  05/27/20 0928     Blood Culture, Routine Gram stain vinh bottle: Gram positive cocci in chains resembling Strep       Results called to and read back by: Brenna Will RN 05/24/2020  10:57      ENTEROCOCCUS FAECALIS  Susceptibility pending      Blood culture [[id number]] Collected:  05/25/20 0911    Order Status:  Completed Specimen:  Blood Updated:  05/26/20 1612     Blood Culture, Routine No Growth to date      No Growth to date          Scheduled Meds:   apixaban  2.5 mg Oral BID    ceFEPime (MAXIPIME) IVPB  2 g Intravenous Q12H    docusate sodium  100 mg Oral BID    finasteride  5 mg Oral Daily    levothyroxine  50 mcg Oral Before breakfast    metoprolol succinate  50 mg Oral Daily    QUEtiapine  50 mg Oral QHS    simvastatin  20 mg Oral QHS    vancomycin (VANCOCIN) IVPB  1,500 mg Intravenous Once     Continuous Infusions:  As Needed: albuterol-ipratropium, dextrose 50 % in water (D50W), haloperidol lactate, HYDROcodone-acetaminophen, ondansetron, promethazine (PHENERGAN) IVPB, sodium phosphate IVPB, [COMPLETED] Pharmacy to dose Vancomycin consult **AND** vancomycin - pharmacy to dose    Significant Imaging:   X-Ray Chest 1 View  Narrative: EXAMINATION:  XR CHEST 1 VIEW    CLINICAL HISTORY:  LSC Trialysis insertion;    TECHNIQUE:  Single frontal view of the chest was performed.    COMPARISON:  05/25/2020    FINDINGS:  Central line via a left subclavian approach terminates over the SVC.  The cardiac silhouette is unchanged.  Lung fields are unchanged noting low lung volumes and atelectasis with central vascular congestion.  No acute osseous findings.  Impression: Left subclavian approach central line terminates over the SVC.  No pneumothorax.    Electronically signed by: Jimbo French  MD  Date:    05/25/2020  Time:    16:05  X-Ray Chest AP Portable  Narrative: EXAMINATION:  XR CHEST AP PORTABLE    CLINICAL HISTORY:  shortness of breath;    TECHNIQUE:  Single frontal view of the chest was performed.    COMPARISON:  05/24/2020    FINDINGS:  Hypoventilatory exam with low lung volumes.  Basilar opacities may represent atelectasis and a small amount of pleural fluid.  No evidence of pneumothorax or large pleural effusion.  Left upper lung zone and right lung zones are relatively clear.  Cardiomediastinal silhouette is stable.  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  There is atherosclerosis.  ICD in stable position.  Bones show degenerative changes.  There is mild gaseous distention of the bowel.  Impression: Hypoventilatory exam with low lung volumes.  Mild left basilar opacity may represent atelectasis and/or small amount of pleural fluid.    Electronically signed by: Shay Banda MD  Date:    05/25/2020  Time:    14:53  Echo Color Flow Doppler? Yes  Addendum: · Mildly decreased left ventricular systolic function. The estimated  ejection fraction is 45%.   · Grade III (severe) left ventricular diastolic dysfunction consistent  with restrictive physiology.   · Septal wall has abnormal motion consistent with right ventricular  pacemaker.   · Concentric left ventricular remodeling.   · Mildly to moderately reduced right ventricular systolic function.   · Severe left atrial enlargement.   · Severe tricuspid regurgitation.   · Mild-to-moderate aortic valve stenosis.   · Aortic valve area is 1.47 cm2; peak velocity is 1.45 m/s; mean gradient  is 5 mmHg.  Narrative: · Mildly decreased left ventricular systolic function. The estimated   ejection fraction is 45%.  · Grade III (severe) left ventricular diastolic dysfunction consistent   with restrictive physiology.  · Septal wall has abnormal motion consistent with right ventricular   pacemaker.  · Concentric left ventricular remodeling.  ·  Mildly to moderately reduced right ventricular systolic function.  · Severe left atrial enlargement.  · Severe tricuspid regurgitation.     CT Head Without Contrast  Narrative: EXAMINATION:  CT HEAD WITHOUT CONTRAST    CLINICAL HISTORY:  Confusion/delirium, altered LOC, unexplained;    TECHNIQUE:  Low dose axial images were obtained through the head.  Coronal and sagittal reformations were also performed. Contrast was not administered.    COMPARISON:  Head CT/5/21/2020    FINDINGS:  There is no acute intracranial hemorrhage, hydrocephalus, midline shift or mass effect. Brain parenchyma appears stable with findings suggestive of chronic microvascular ischemic change.  The basal cisterns are patent. The mastoid air cells and paranasal sinuses are clear of acute process. The visualized bones of the calvarium demonstrate no acute osseous abnormality.  Impression: 1. No CT evidence of acute intracranial abnormality or significant interval detrimental change from prior head CT 5/21/2020.  Clinical correlation and further evaluation as warranted.  2. Generalized cerebral volume loss and findings suggestive of chronic microvascular ischemic change.    Electronically signed by: Trang Gauthier MD  Date:    05/25/2020  Time:    05:02  X-Ray Chest AP Portable  Narrative: EXAMINATION:  XR CHEST AP PORTABLE    CLINICAL HISTORY:  shortness of breath;    TECHNIQUE:  Single frontal view of the chest was performed.    COMPARISON:  08:48    FINDINGS:  Lungs are significantly hypoinflated.  Left basilar atelectatic changes are seen.  Heart is stable in size.  No evidence of pneumothorax or large pleural effusion.  Impression: No significant change.    Electronically signed by: Jermaine Bradley MD  Date:    05/24/2020  Time:    23:57        Assessment/Plan:      * Cholecystitis without cholelithiasis  Leukocytosis  Surgery on board  GI consult- s/p ERCP with no stent placement.    5/26 awaiting final cutlres sensitvity, continue current  abx  5/27 T bili is up,   Appreciate GI input  Septic met with family, might consider hospice at home on Friday if no improvement    Bacteremia  Blood cx with enterococcus resembling strep- continue Vancomycin  Repeat blood cx  Consult ID  [id number] continue current vanc and cefepime  5/27 continue current abx regimen  Poor prognosis with LFT elevation n and renalk failure    Dysphagia  Awaits SLP eval      JOI (acute kidney injury)  CKD (chronic kidney disease), stage III  Hyperkalemia  Avoid nephrotoxic medication  Strict input/ output  Renally dose of medication  Gentle rehydration  Treat hyperk  Consult nephrology- appreciates rec's  Kidney USS with bilateral perinephric stranding.  continue Zosyn      Chronic systolic congestive heart failure  AICD (automatic cardioverter/defibrillator) present  S/P CABG (coronary artery bypass graft)  Coronary artery disease involving native coronary artery of native heart without angina pectoris  Continue statin  Hold Lasix  Monitor  Hold K          Benign prostatic hyperplasia with weak urinary stream    Continue Proscar    Hypothyroidism  Continue Synthroid      HTN (hypertension), benign  Hyperlipidemia  Continues statin  Continue Metoprolol,   Hold Lasix        Chronic atrial fibrillation  Continue metoprolol  Eliquis was on hold for procedure, will restart at lower dose due on hold         VTE Risk Mitigation (From admission, onward)         Ordered     apixaban tablet 2.5 mg  2 times daily      05/25/20 0904                Critical care time spent on the evaluation and treatment of severe organ dysfunction, review of pertinent labs and imaging studies, discussions with consulting providers and discussions with patient/family: 58 minutes.      Janak Kinney DO  Department of Hospital Medicine   [facility]

## 2020-05-27 NOTE — ASSESSMENT & PLAN NOTE
Blood cx with enterococcus resembling strep- continue Vancomycin  Repeat blood cx  Consult ID  5/[id number] continue current vanc and cefepime

## 2020-05-27 NOTE — PROGRESS NOTES
[facility]  Infectious Disease  Progress Note    Patient Name: Danyel Rangel  MRN: [id number]  Admission Date: 5/23/2020  Length of Stay: 2 days  Attending Physician: Janak Kinney DO  Primary Care Provider: Pa Spence MD    Isolation Status: No active isolations  Assessment/Plan:      Bacteremia  85 y.o. M with a PMHx of CAD s/p CABG (2006), HFrEF (s/p AICD, EF 45%), a fib on eliquis, HLD, HTN, CKD3 who presented to the ED on 5/23 with severe RUQ pain and R chest pain with deep breaths but no n/v/f/c, CT A/P obtained 5/22 with gallstones in gallbladder and CBD with no evident ductal dilatation, possible inflammation around pancreas as well. Notable labs on admission WBC 20.47, T bili 4.1, lipase WNL, covid negative. Started on zosyn 5/23 and blood cultures obtained, now growing enterococcus species, ampicillin sensitive further susceptibilities pending. Vanc added 5/24. S/p ERCP with GI on 5/24 with sphincterotomy and stone exctraction. 5/26 ID consulted and due to worsening renal function recommended switching from vanc/zosyn to vanc/cefepime. On 5/26 zosyn discontinued and cefepime added. On 5/26 transferred to ICU for worsening confusion and started on CRRT.   Remains on vanc, cefepime with worsening confusion and agitation.  Recommendations:   - Due to worsening mental status and no improvement in leukocytosis, we recommend pan-culture (repeat BCx, obtain UA/UCx, resp. sputum culture) and consider repeat CXR  - Called micro lab and enterococcus is ampicillin-sensitive, further susceptibilities pending  - Recommend discontinuing vanc and cefepime and switching to unasyn, will give good anaerobic coverage as well as cover the enterococcus  - Continue to follow cultures and sensitivities, we will follow along with you    Thank you for your consult. We will continue to follow. Please contact us with any questions.    Kelli Jimenez, LISA-I  Infectious Disease  [facility]  [place]    Subjective:     Principal Problem:Cholecystitis without cholelithiasis    HPI: 85 y.o. M with a PMHx of CAD s/p CABG (2006), HFrEF (s/p AICD, EF 45%), a fib on eliquis, HLD, HTN, CKD3 who presented to the ED on 5/21 after a trip and fall, hit his head and sustained a laceration but CT head showed no acute abnormality. Discharged from ED, at the time VSS and no leukocytosis, but notably T. Bili 2.5. On 5/22 went to PCP and complained of diffuse abdominal pain/distension and urinary retention x 1 day, ordered CT A/P 5/22 which showed multiple gallstones in gallbladder and in CBD with no ductal dilitation. There was also subtle inflammatory changes adjacent to head of pancreas, can't exclude pancreatitis. On 5/23 presented to ED for severe RUQ abdominal pain and pleuritic chest pain on the R side, no nausea, vomiting, fevers, chills. Afebrile, VSS, WBC 22.9 (88% neutrophils, 0% bands), lipase 215, Cr 1.91, , covid negative. Surgery consulted, GI consulted for ERCP. Started on zosyn 5/23. Blood cultures obtained, now growing enterococcus species (susceptibilities pending) and vancomycin added 5/24. ID consulted for enterococcus bacteremia. Pt went for ERCP with GI 5/24 with stone extraction and sphincterotomy. 5/25 he had worsening confusion and repeat CT head negative for acute process. Kidney function worsening, nephrology consulted and plan for HD.     5/26 yesterday patient had increasing confusion and worsening kidney function and was transferred to ICU in the afternoon. He had trialysis line placed L subclavian, and was started on CRRT. UF rate turned down due to hypotension. Yesterday echo obtained, showed LVEF 45% and grade III DD. . Due to worsening renal function yesterday we recommended switching from vanc/zosyn to vanc/cefepime. Zosyn discontinued and cefepime added. WBC 22.9-->21.1. Afebrile.     5/27 Intermittent delirium-- ICU delirium vs related to sepsis vs drug-related.  On CRRT. On vanc/cefepime. Afebrile with stable white count. T bili slowly up-trending 5.5-->6.2 with direct component of 2.6. Cr stable 1.5. Called micro lab and enterococcus species from blood cultures amp-sensitive.       Past Medical History:   Diagnosis Date    Atrial fibrillation     Coronary artery disease     Encounter for blood transfusion     Hyperlipidemia     Hypertension        Past Surgical History:   Procedure Laterality Date    CARDIAC CATHETERIZATION  2016    CORONARY ARTERY BYPASS GRAFT  2006    ERCP N/A 5/24/2020    Procedure: ERCP (ENDOSCOPIC RETROGRADE CHOLANGIOPANCREATOGRAPHY);  Surgeon: David Cox MD;  Location: [facility];  Service: Endoscopy;  Laterality: N/A;    TOTAL KNEE ARTHROPLASTY Left 2010            Review of patient's allergies indicates:   Allergen Reactions    Xarelto [rivaroxaban] Anaphylaxis       Medications:  Medications Prior to Admission   Medication Sig    allopurinol (ZYLOPRIM) 300 MG tablet TAKE 1 TABLET EVERY DAY    apixaban (ELIQUIS) 5 mg Tab Take 1 tablet (5 mg total) by mouth 2 (two) times daily.    finasteride (PROSCAR) 5 mg tablet Take 1 tablet (5 mg total) by mouth once daily.    furosemide (LASIX) 20 MG tablet Take 1 tablet (20 mg total) by mouth 2 (two) times daily.    levothyroxine (SYNTHROID) 50 MCG tablet TAKE 1 TABLET EVERY DAY    metoprolol succinate (TOPROL-XL) 50 MG 24 hr tablet Take 1 tablet (50 mg total) by mouth once daily.    potassium chloride SA (K-DUR,KLOR-CON) 10 MEQ tablet TAKE 1 TABLET EVERY DAY    simvastatin (ZOCOR) 20 MG tablet Take 1 tablet (20 mg total) by mouth every evening.     Antibiotics (From admission, onward)    Start     Stop Route Frequency Ordered    05/27/20 1000  cefepime 2 g in dextrose 5% 50 mL IVPB (ready to mix system)      -- IV Every 12 hours (non-standard times) 05/27/20 0925    05/24/20 1152  vancomycin - pharmacy to dose  (vancomycin IVPB)      -- IV pharmacy to manage frequency 05/24/20 9421         Antifungals (From admission, onward)    None        Antivirals (From admission, onward)    None           Immunization History   Administered Date(s) Administered    Influenza - High Dose - PF (65 years and older) 11/10/2016, 10/25/2018    Influenza - Quadrivalent - PF (6 months and older) 11/19/2019    Influenza Split 10/20/2015    Pneumococcal Conjugate - 13 Valent 11/10/2016    Tdap 05/21/2020       Family History     None        Social History     Socioeconomic History    Marital status:      Spouse name: Not on file    Number of children: Not on file    Years of education: Not on file    Highest education level: Not on file   Occupational History    Not on file   Social Needs    Financial resource strain: Not on file    Food insecurity:     Worry: Not on file     Inability: Not on file    Transportation needs:     Medical: Not on file     Non-medical: Not on file   Tobacco Use    Smoking status: Never Smoker    Smokeless tobacco: Never Used   Substance and Sexual Activity    Alcohol use: No    Drug use: No    Sexual activity: Not on file   Lifestyle    Physical activity:     Days per week: Not on file     Minutes per session: Not on file    Stress: Not on file   Relationships    Social connections:     Talks on phone: Not on file     Gets together: Not on file     Attends Yazidi service: Not on file     Active member of club or organization: Not on file     Attends meetings of clubs or organizations: Not on file     Relationship status: Not on file   Other Topics Concern    Not on file   Social History Narrative    Not on file     Review of Systems   Unable to perform ROS: Mental status change   Gastrointestinal: Positive for abdominal distention and abdominal pain.     Objective:     Vital Signs (Most Recent):  Temp: 97.9 °F (36.6 °C) (05/27/20 1110)  Pulse: 70 (05/27/20 1130)  Resp: (!) 32 (05/27/20 1130)  BP: (!) 89/49 (05/27/20 1122)  SpO2: 100 % (05/27/20 1130) Vital  Signs (24h Range):  Temp:  [97.3 °F (36.3 °C)-98.3 °F (36.8 °C)] 97.9 °F (36.6 °C)  Pulse:  [] 70  Resp:  [14-70] 32  SpO2:  [74 %-100 %] 100 %  BP: ()/(49-79) 89/49     Weight: 81.7 kg (180 lb 1.9 oz)  Body mass index is 27.39 kg/m².    Estimated Creatinine Clearance: 34.8 mL/min (A) (based on SCr of 1.5 mg/dL (H)).    Physical Exam   Constitutional: He appears well-developed and well-nourished. No distress.   HENT:   Head: Normocephalic and atraumatic.   Eyes:   Scleral icterus   Neck: Normal range of motion. Neck supple.   L IJ trialysis line   Cardiovascular: Normal rate, regular rhythm and normal heart sounds. Exam reveals no gallop and no friction rub.   No murmur heard.  Pulmonary/Chest: Effort normal and breath sounds normal. No respiratory distress. He has no wheezes. He has no rales.   NC In place   Abdominal: Soft. He exhibits distension. There is no tenderness. There is no rebound and no guarding.   Musculoskeletal: He exhibits no edema.   Soft restraints in place   Neurological:   Oriented only to self, not oriented to place, time, or situation   Skin:   Scattered ecchymosis, large ecchymosis present on R lateral thigh  Speech mumbled   Nursing note and vitals reviewed.      Significant Labs:   CBC:   Recent Labs   Lab 05/26/20 0414 05/27/20 0327   WBC 21.10* 21.95*   HGB 10.1* 9.5*   HCT 31.5* 29.4*    154     CMP:   Recent Labs   Lab 05/26/20  0414  05/26/20  2122 05/27/20 0327 05/27/20  1128   *  135*  135*   < > 136 137  137 136   K 4.9  4.9  4.9   < > 4.6 4.7  4.7 4.7     103  103   < > 101 102  102 102   CO2 23  23  23   < > 26 24  24 23     101  101   < > 125* 126*  126* 117*   BUN 45*  45*  45*   < > 28* 25*  25* 28*   CREATININE 2.3*  2.3*  2.3*   < > 1.5* 1.4  1.4 1.5*   CALCIUM 8.1*  8.1*  8.1*   < > 8.1* 8.1*  8.1* 8.0*   PROT 6.0  --   --  5.9*  --    ALBUMIN 2.3*  2.3*   < > 2.4* 2.4*  2.4* 2.4*   BILITOT 5.5*  --   --   6.2*  --    ALKPHOS 51*  --   --  56  --    AST 43*  --   --  40  --    ALT 22  --   --  22  --    ANIONGAP 9  9  9   < > 9 11  11 11   EGFRNONAA 25*  25*  25*   < > 42* 45*  45* 42*    < > = values in this interval not displayed.     Microbiology Results (last 7 days)     Procedure Component Value Units Date/Time    Blood culture [[id number]]  (Abnormal) Collected:  05/23/20 1650    Order Status:  Completed Specimen:  Blood from Peripheral, Antecubital, Left Updated:  05/27/20 0928     Blood Culture, Routine Gram stain vinh bottle: Gram positive cocci in chains resembling Strep       Results called to and read back by:Kathya Sanchez RN 05/24/2020  19:03      ENTEROCOCCUS FAECALIS  For susceptibility see order #[id number]      Blood culture [[id number]]  (Abnormal) Collected:  05/23/20 1700    Order Status:  Completed Specimen:  Blood from Peripheral, Antecubital, Right Updated:  05/27/20 0928     Blood Culture, Routine Gram stain vinh bottle: Gram positive cocci in chains resembling Strep       Results called to and read back by: Brenna Will RN 05/24/2020  10:57      ENTEROCOCCUS FAECALIS  Susceptibility pending      Blood culture [[id number]] Collected:  05/25/20 0911    Order Status:  Completed Specimen:  Blood Updated:  05/26/20 1612     Blood Culture, Routine No Growth to date      No Growth to date        All pertinent labs within the past 24 hours have been reviewed.    Significant Imaging: I have reviewed all pertinent imaging results/findings within the past 24 hours.

## 2020-05-27 NOTE — NURSING
Notified Dr. Andrade and Dr. Kinney over the phone of patient's NGT with 300ml of coffee ground like drainage noted in suction canister. New orders being placed for Protonix; see orders.

## 2020-05-27 NOTE — NURSING
Grand daughter Nyla came with grandjax Clifton to visit patient. Dr. Kinney discussed patient's condition and plan of care with them. Nyla collected patient's personal belongings to bring home; this included: pair of pants, shirt and two pairs of shoes. A brown wallet with insurance cards and Drivers License along with a total of $383 in cash.

## 2020-05-27 NOTE — PROGRESS NOTES
Pharmacokinetic Assessment Follow Up: IV Vancomycin    Vancomycin serum concentration assessment(s):    The random level was drawn correctly and can be used to guide therapy at this time. The measurement is below the desired definitive target range of 15 to 20 mcg/mL.    Vancomycin Regimen Plan:    CRRT continued today. Redose with vancomycin 1500mg x1 and obtain repeat random with AM labs on 5/28 to assess need for redosing.    Drug levels (last 3 results):  Recent Labs   Lab Result Units 05/25/20  1225 05/26/20  0414 05/27/20  1128   Vancomycin, Random ug/mL 19.4 17.9 14.6       Pharmacy will continue to follow and monitor vancomycin.    Please contact pharmacy at extension [phone] for questions regarding this assessment.    Thank you for the consult,   Dyana Coles       Patient brief summary:  Danyel Rangel is a 85 y.o. male initiated on antimicrobial therapy with IV Vancomycin for treatment of bacteremia    The patient's current regimen is dose by level    Drug Allergies:   Review of patient's allergies indicates:   Allergen Reactions    Xarelto [rivaroxaban] Anaphylaxis       Actual Body Weight:   81kg    Renal Function:   Estimated Creatinine Clearance: 34.8 mL/min (A) (based on SCr of 1.5 mg/dL (H)).,     Dialysis Method (if applicable):  CRRT    CBC (last 72 hours):  Recent Labs   Lab Result Units 05/25/20  0450 05/26/20  0414 05/27/20  0327   WBC K/uL 22.90* 21.10* 21.95*   Hemoglobin g/dL 11.1* 10.1* 9.5*   Hematocrit % 35.2* 31.5* 29.4*   Platelets K/uL 151 162 154   Gran% % 88.0* 89.5* 88.9*   Lymph% % 4.0* 3.2* 2.9*   Mono% % 3.0* 5.6 7.3   Eosinophil% % 0.0 0.9 0.0   Basophil% % 0.0 0.0 0.0   Differential Method  Manual Automated Automated       Metabolic Panel (last 72 hours):  Recent Labs   Lab Result Units 05/24/20  1256 05/24/20  1534 05/24/20  2018 05/25/20  0450 05/25/20  0803 05/25/20  1225 05/25/20  1506 05/25/20  1957 05/25/20  2325 05/26/20  0414 05/26/20  0845 05/26/20  1423  05/26/20  1620 05/26/20 2122 05/26/20  2205 05/27/20  0327 05/27/20  0801 05/27/20  1128   Sodium mmol/L  --  133* 138 136  136  136 136 134* 135*  134* 135* 135*  136 135*  135*  135* 138 137  137  --  136  --  137  137  --  136   Sodium Urine Random mmol/L <20*  --   --   --   --   --   --   --   --   --   --   --   --   --   --   --   --   --    Potassium mmol/L  --  5.6*  5.6* 4.7  4.7  4.5 5.0  5.0  5.0  5.0 5.2*  5.2* 5.4*  5.4* 5.9*  5.8* 5.0 4.8  4.9 4.9  4.9  4.9 4.6 4.9  4.9  --  4.6  --  4.7  4.7  --  4.7   Chloride mmol/L  --  101 105 104  104  104 103 102 102  101 103 103  103 103  103  103 104 103  103  --  101  --  102  102  --  102   CO2 mmol/L  --  23 20* 18*  18*  18* 20* 17* 20*  19* 22* 21*  22* 23  23  23 25 23  23  --  26  --  24  24  --  23   Glucose mg/dL  --  329* 55* 113*  113*  113* 126* 130* 176*  173* 130* 110  111* 101  101  101 102 114*  114*  --  125*  --  126*  126*  --  117*   BUN, Bld mg/dL  --  60* 62* 68*  68*  68* 71* 76* 78*  77* 64* 56*  57* 45*  45*  45* 39* 34*  34*  --  28*  --  25*  25*  --  28*   Creatinine mg/dL  --  3.1* 3.4* 3.8*  3.8*  3.8* 4.1* 4.4* 4.4*  4.5* 3.4* 2.9*  2.9* 2.3*  2.3*  2.3* 2.1* 1.8*  1.8*  --  1.5*  --  1.4  1.4  --  1.5*   Albumin g/dL  --   --   --  2.6*  --   --  2.5*  --  2.4* 2.3*  2.3*  --  2.4*  --  2.4*  --  2.4*  2.4*  --  2.4*   Total Bilirubin mg/dL  --   --   --  5.6*  --   --   --   --   --  5.5*  --   --   --   --   --  6.2*  --   --    Alkaline Phosphatase U/L  --   --   --  52*  --   --   --   --   --  51*  --   --   --   --   --  56  --   --    AST U/L  --   --   --  26  --   --   --   --   --  43*  --   --   --   --   --  40  --   --    ALT U/L  --   --   --  19  --   --   --   --   --  22  --   --   --   --   --  22  --   --    Magnesium mg/dL  --   --   --   --   --   --  2.3  --  2.3  --  2.4  --  2.4  --  2.4  --  2.5 2.5   Phosphorus mg/dL  --   --   --   --    --   --  6.4*  --  4.2 3.5  --  3.6  --  3.8  --  4.0  --  4.2       Vancomycin Administrations:  vancomycin given in the last 96 hours                     vancomycin 1,250 mg in dextrose 5 % 250 mL IVPB (mg) 1,250 mg New Bag 05/26/20 1427    vancomycin in dextrose 5 % 1 gram/250 mL IVPB 1,000 mg (mg) 1,000 mg New Bag 05/25/20 1723                      Microbiologic Results:  Microbiology Results (last 7 days)       Procedure Component Value Units Date/Time    Blood culture [[id number]]  (Abnormal) Collected:  05/23/20 1650    Order Status:  Completed Specimen:  Blood from Peripheral, Antecubital, Left Updated:  05/27/20 0928     Blood Culture, Routine Gram stain vinh bottle: Gram positive cocci in chains resembling Strep       Results called to and read back by:Kathya Sanchez RN 05/24/2020  19:03      ENTEROCOCCUS FAECALIS  For susceptibility see order #[id number]      Blood culture [[id number]]  (Abnormal) Collected:  05/23/20 1700    Order Status:  Completed Specimen:  Blood from Peripheral, Antecubital, Right Updated:  05/27/20 0928     Blood Culture, Routine Gram stain vinh bottle: Gram positive cocci in chains resembling Strep       Results called to and read back by: Brenna Will RN 05/24/2020  10:57      ENTEROCOCCUS FAECALIS  Susceptibility pending      Blood culture [[id number]] Collected:  05/25/20 0911    Order Status:  Completed Specimen:  Blood Updated:  05/26/20 1612     Blood Culture, Routine No Growth to date      No Growth to date

## 2020-05-27 NOTE — ASSESSMENT & PLAN NOTE
- s/p ERCP with sphincterotomy and stone extraction 5/24  - no purulent drainage noted from cbd  - monitor lfts  - tb rising, though indirect predominant - may have component of sepsis related   - add direct bili to todays labs  - monitor clinically  - on abx

## 2020-05-27 NOTE — SUBJECTIVE & OBJECTIVE
Subjective:     Interval History: More alert today, mildly tachypneic. Quite talkative though thought process altered. Afebrile.     Review of Systems   Unable to perform ROS: Mental status change     Objective:     Vital Signs (Most Recent):  Temp: 97.3 °F (36.3 °C) (05/27/20 0727)  Pulse: 71 (05/27/20 0830)  Resp: (!) 33 (05/27/20 0830)  BP: (!) 101/58 (05/27/20 0800)  SpO2: 99 % (05/27/20 0830) Vital Signs (24h Range):  Temp:  [97.3 °F (36.3 °C)-98.3 °F (36.8 °C)] 97.3 °F (36.3 °C)  Pulse:  [] 71  Resp:  [15-70] 33  SpO2:  [74 %-100 %] 99 %  BP: ()/(50-79) 101/58     Weight: 81.7 kg (180 lb 1.9 oz) (05/25/20 1445)  Body mass index is 27.39 kg/m².      Intake/Output Summary (Last 24 hours) at 5/27/2020 0849  Last data filed at 5/27/2020 0607  Gross per 24 hour   Intake 1100 ml   Output 1147 ml   Net -47 ml       Lines/Drains/Airways     Central Venous Catheter Line            Trialysis (Dialysis) Catheter 05/25/20 1538 left subclavian 1 day          Drain                 Urethral Catheter 05/24/20 1720 Straight-tip 14 Fr. 2 days                Physical Exam   Constitutional: He appears well-developed.   HENT:   Head: Normocephalic and atraumatic.   Eyes: Conjunctivae are normal. Scleral icterus is present.   Cardiovascular: Normal rate. Exam reveals no friction rub.   Pulmonary/Chest: No stridor. He exhibits no tenderness.   Tachypneic   Abdominal: Soft. He exhibits no distension. There is no guarding.   Musculoskeletal: He exhibits no tenderness or deformity.   Neurological: He is alert.   Skin: He is not diaphoretic.   +ecchymoses   Psychiatric:   Altered thought content   Nursing note and vitals reviewed.      Significant Labs:  CBC:   Recent Labs   Lab 05/26/20  0414 05/27/20  0327   WBC 21.10* 21.95*   HGB 10.1* 9.5*   HCT 31.5* 29.4*    154         Significant Imaging:  Imaging results within the past 24 hours have been reviewed.

## 2020-05-27 NOTE — SUBJECTIVE & OBJECTIVE
Interval History: on CRRT,  Continue current POC, no escalation  Review of Systems   Constitutional: Positive for activity change, chills and fatigue. Negative for fever.   Respiratory: Negative for cough and shortness of breath.    Cardiovascular: Negative for chest pain.   Genitourinary: Negative for hematuria.   Neurological: Negative for seizures and headaches.   Psychiatric/Behavioral: Positive for agitation and confusion.     Objective:     Vital Signs (Most Recent):  Temp: 97.9 °F (36.6 °C) (05/27/20 1110)  Pulse: 70 (05/27/20 1408)  Resp: (!) 33 (05/27/20 1408)  BP: (!) 88/51 (05/27/20 1408)  SpO2: 100 % (05/27/20 1408) Vital Signs (24h Range):  Temp:  [97.3 °F (36.3 °C)-98.3 °F (36.8 °C)] 97.9 °F (36.6 °C)  Pulse:  [] 70  Resp:  [14-70] 33  SpO2:  [74 %-100 %] 100 %  BP: ()/(49-79) 88/51     Weight: 81.7 kg (180 lb 1.9 oz)  Body mass index is 27.39 kg/m².    Intake/Output Summary (Last 24 hours) at 5/27/2020 1427  Last data filed at 5/27/2020 1414  Gross per 24 hour   Intake 900 ml   Output 1595 ml   Net -695 ml      Physical Exam   Constitutional: He appears well-developed and well-nourished.   HENT:   Head: Atraumatic.   Pulmonary/Chest: Effort normal.   Abdominal: Soft.   Neurological: He is alert.   Psychiatric: He has a normal mood and affect.   Nursing note and vitals reviewed.      Significant Labs:   Recent Labs   Lab 05/25/20  0450 05/26/20  0414 05/27/20  0327   WBC 22.90* 21.10* 21.95*   HGB 11.1* 10.1* 9.5*   HCT 35.2* 31.5* 29.4*    162 154     Recent Labs   Lab 05/24/20  1534  05/26/20  2122 05/26/20  2205 05/27/20  0327 05/27/20  0801 05/27/20  1128   *   < > 136  --  137  137  --  136   K 5.6*  5.6*   < > 4.6  --  4.7  4.7  --  4.7      < > 101  --  102  102  --  102   CO2 23   < > 26  --  24  24  --  23   BUN 60*   < > 28*  --  25*  25*  --  28*   CREATININE 3.1*   < > 1.5*  --  1.4  1.4  --  1.5*   *   < > 125*  --  126*  126*  --  117*    CALCIUM 8.3*   < > 8.1*  --  8.1*  8.1*  --  8.0*   MG  --    < >  --  2.4  --  2.5 2.5   PHOS  --    < > 3.8  --  4.0  --  4.2   LIPASE 30  --   --   --   --   --   --     < > = values in this interval not displayed.     Recent Labs   Lab 05/21/20  1056  05/25/20  0450  05/26/20  0414  05/26/20  2122 05/27/20  0327 05/27/20  1128   ALKPHOS 60   < > 52*  --  51*  --   --  56  --    ALT 16   < > 19  --  22  --   --  22  --    AST 27   < > 26  --  43*  --   --  40  --    ALBUMIN 3.8   < > 2.6*   < > 2.3*  2.3*   < > 2.4* 2.4*  2.4* 2.4*   PROT 6.6   < > 6.4  --  6.0  --   --  5.9*  --    BILITOT 2.5*   < > 5.6*  --  5.5*  --   --  6.2*  --    INR 1.6*  --   --   --  1.1  --   --   --   --     < > = values in this interval not displayed.      Recent Labs     05/25/20  0117 05/25/20  0450 05/25/20  1225   TROPONINI 0.093* 0.094* 0.115*     Recent Labs   Lab 05/24/20  1524 05/24/20  2308 05/25/20  0121 05/25/20  1134 05/25/20  1417 05/26/20  2211   POCTGLUCOSE 141* 65* 156* 140* 161* 126*     Hemoglobin A1C   Date Value Ref Range Status   04/26/2018 5.5 4.0 - 5.6 % Final     Comment:     According to ADA guidelines, hemoglobin A1c <7.0% represents  optimal control in non-pregnant diabetic patients. Different  metrics may apply to specific patient populations.   Standards of Medical Care in Diabetes-2016.  For the purpose of screening for the presence of diabetes:  <5.7%     Consistent with the absence of diabetes  5.7-6.4%  Consistent with increasing risk for diabetes   (prediabetes)  >or=6.5%  Consistent with diabetes  Currently, no consensus exists for use of hemoglobin A1c  for diagnosis of diabetes for children.  This Hemoglobin A1c assay has significant interference with fetal   hemoglobin   (HbF). The results are invalid for patients with abnormal amounts of   HbF,   including those with known Hereditary Persistence   of Fetal Hemoglobin. Heterozygous hemoglobin variants (HbAS, HbAC,   HbAD, HbAE, HbA2) do not  significantly interfere with this assay;   however, presence of multiple variants in a sample may impact the %   interference.         No results for input(s): COLORU, CLARITYU, SPECGRAV, PHUR, PROTEINUA, GLUCOSEU, BLOODU, WBCU, RBCU, BACTERIA, MUCUS in the last 24 hours.    Invalid input(s):  BILIRUBINCON    Microbiology Results (last 7 days)     Procedure Component Value Units Date/Time    Blood culture [[id number]]  (Abnormal) Collected:  05/23/20 1650    Order Status:  Completed Specimen:  Blood from Peripheral, Antecubital, Left Updated:  05/27/20 0928     Blood Culture, Routine Gram stain vinh bottle: Gram positive cocci in chains resembling Strep       Results called to and read back by:Kathya Sanchez RN 05/24/2020  19:03      ENTEROCOCCUS FAECALIS  For susceptibility see order #[id number]      Blood culture [[id number]]  (Abnormal) Collected:  05/23/20 1700    Order Status:  Completed Specimen:  Blood from Peripheral, Antecubital, Right Updated:  05/27/20 0928     Blood Culture, Routine Gram stain vinh bottle: Gram positive cocci in chains resembling Strep       Results called to and read back by: Brenna Will RN 05/24/2020  10:57      ENTEROCOCCUS FAECALIS  Susceptibility pending      Blood culture [[id number]] Collected:  05/25/20 0911    Order Status:  Completed Specimen:  Blood Updated:  05/26/20 1612     Blood Culture, Routine No Growth to date      No Growth to date          Scheduled Meds:   apixaban  2.5 mg Oral BID    ceFEPime (MAXIPIME) IVPB  2 g Intravenous Q12H    docusate sodium  100 mg Oral BID    finasteride  5 mg Oral Daily    levothyroxine  50 mcg Oral Before breakfast    metoprolol succinate  50 mg Oral Daily    QUEtiapine  50 mg Oral QHS    simvastatin  20 mg Oral QHS    vancomycin (VANCOCIN) IVPB  1,500 mg Intravenous Once     Continuous Infusions:  As Needed: albuterol-ipratropium, dextrose 50 % in water (D50W), haloperidol lactate, HYDROcodone-acetaminophen, ondansetron,  promethazine (PHENERGAN) IVPB, sodium phosphate IVPB, [COMPLETED] Pharmacy to dose Vancomycin consult **AND** vancomycin - pharmacy to dose    Significant Imaging:   X-Ray Chest 1 View  Narrative: EXAMINATION:  XR CHEST 1 VIEW    CLINICAL HISTORY:  LSC Trialysis insertion;    TECHNIQUE:  Single frontal view of the chest was performed.    COMPARISON:  05/25/2020    FINDINGS:  Central line via a left subclavian approach terminates over the SVC.  The cardiac silhouette is unchanged.  Lung fields are unchanged noting low lung volumes and atelectasis with central vascular congestion.  No acute osseous findings.  Impression: Left subclavian approach central line terminates over the SVC.  No pneumothorax.    Electronically signed by: Jimbo French MD  Date:    05/25/2020  Time:    16:05  X-Ray Chest AP Portable  Narrative: EXAMINATION:  XR CHEST AP PORTABLE    CLINICAL HISTORY:  shortness of breath;    TECHNIQUE:  Single frontal view of the chest was performed.    COMPARISON:  05/24/2020    FINDINGS:  Hypoventilatory exam with low lung volumes.  Basilar opacities may represent atelectasis and a small amount of pleural fluid.  No evidence of pneumothorax or large pleural effusion.  Left upper lung zone and right lung zones are relatively clear.  Cardiomediastinal silhouette is stable.  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  There is atherosclerosis.  ICD in stable position.  Bones show degenerative changes.  There is mild gaseous distention of the bowel.  Impression: Hypoventilatory exam with low lung volumes.  Mild left basilar opacity may represent atelectasis and/or small amount of pleural fluid.    Electronically signed by: Shay Banda MD  Date:    05/25/2020  Time:    14:53  Echo Color Flow Doppler? Yes  Addendum: · Mildly decreased left ventricular systolic function. The estimated  ejection fraction is 45%.   · Grade III (severe) left ventricular diastolic dysfunction consistent  with restrictive  physiology.   · Septal wall has abnormal motion consistent with right ventricular  pacemaker.   · Concentric left ventricular remodeling.   · Mildly to moderately reduced right ventricular systolic function.   · Severe left atrial enlargement.   · Severe tricuspid regurgitation.   · Mild-to-moderate aortic valve stenosis.   · Aortic valve area is 1.47 cm2; peak velocity is 1.45 m/s; mean gradient  is 5 mmHg.  Narrative: · Mildly decreased left ventricular systolic function. The estimated   ejection fraction is 45%.  · Grade III (severe) left ventricular diastolic dysfunction consistent   with restrictive physiology.  · Septal wall has abnormal motion consistent with right ventricular   pacemaker.  · Concentric left ventricular remodeling.  · Mildly to moderately reduced right ventricular systolic function.  · Severe left atrial enlargement.  · Severe tricuspid regurgitation.     CT Head Without Contrast  Narrative: EXAMINATION:  CT HEAD WITHOUT CONTRAST    CLINICAL HISTORY:  Confusion/delirium, altered LOC, unexplained;    TECHNIQUE:  Low dose axial images were obtained through the head.  Coronal and sagittal reformations were also performed. Contrast was not administered.    COMPARISON:  Head CT/5/21/2020    FINDINGS:  There is no acute intracranial hemorrhage, hydrocephalus, midline shift or mass effect. Brain parenchyma appears stable with findings suggestive of chronic microvascular ischemic change.  The basal cisterns are patent. The mastoid air cells and paranasal sinuses are clear of acute process. The visualized bones of the calvarium demonstrate no acute osseous abnormality.  Impression: 1. No CT evidence of acute intracranial abnormality or significant interval detrimental change from prior head CT 5/21/2020.  Clinical correlation and further evaluation as warranted.  2. Generalized cerebral volume loss and findings suggestive of chronic microvascular ischemic change.    Electronically signed by: Trang  MD Disha  Date:    05/25/2020  Time:    05:02  X-Ray Chest AP Portable  Narrative: EXAMINATION:  XR CHEST AP PORTABLE    CLINICAL HISTORY:  shortness of breath;    TECHNIQUE:  Single frontal view of the chest was performed.    COMPARISON:  08:48    FINDINGS:  Lungs are significantly hypoinflated.  Left basilar atelectatic changes are seen.  Heart is stable in size.  No evidence of pneumothorax or large pleural effusion.  Impression: No significant change.    Electronically signed by: Jermaine Bradley MD  Date:    05/24/2020  Time:    23:57

## 2020-05-27 NOTE — PROGRESS NOTES
Pharmacist Renal Dose Adjustment Note    Danyel Rangel is a 85 y.o. male being treated with the medication cefepime    Patient Data:    Vital Signs (Most Recent):  Temp: 97.3 °F (36.3 °C) (05/27/20 0727)  Pulse: 71 (05/27/20 0830)  Resp: (!) 33 (05/27/20 0830)  BP: (!) 101/58 (05/27/20 0800)  SpO2: 99 % (05/27/20 0830)   Vital Signs (72h Range):  Temp:  [97.1 °F (36.2 °C)-99.1 °F (37.3 °C)]   Pulse:  []   Resp:  [10-70]   BP: ()/()   SpO2:  [67 %-100 %]      Recent Labs   Lab 05/26/20  1423 05/26/20 2122 05/27/20  0327   CREATININE 1.8*  1.8* 1.5* 1.4  1.4     Serum creatinine: 1.4 mg/dL 05/27/20 0327  Estimated creatinine clearance: 37.3 mL/min    Medication:cefepime dose: 1gram frequency q24 will be changed to medication:cefepime dose:2gram frequency:q12h (Crrt dosing)    Pharmacist's Name: Fito Wiley  Pharmacist's Extension: [phone]

## 2020-05-27 NOTE — PHYSICIAN QUERY
PT Name: Danyel Rangel  MR #: [id number]    CAUSE AND EFFECT RELATIONSHIP CLARIFICATION     CDS/: Sue You RN                 Contact information:[email or web address]    This form is a permanent document in the medical record.     Query Date: May 27, 2020    By submitting this query, we are merely seeking further clarification of documentation. Please utilize your independent clinical judgment when addressing the question(s) below.    Supporting Clinical Findings   Location in Medical Record   abdomen unchanged ,   Confused  Sepsis, s/p ercp , ID and nephro on board  Will hold surgery till medically cleared.    Abnormal finding on GI tract imaging  - s/p ERCP with sphincterotomy and stone extraction 5/24  - no purulent drainage noted from cbd  - monitor lfts  - tb rising, though indirect predominant - may have component of sepsis related          - add direct bili to todays labs  - monitor clinically  - on abx                                                                                                                                                                               Blood culture   ENTEROCOCCUS FAECALIS   Wound care PN 5/25          GI PN 5/27                      Lab 5/23         Provider, please clarify if there is any clinical correlation between __sepsis__ and __enterococcus faecalis__.           Are the conditions:      [ X ] Due to or associated with each other   [  ] Unrelated to each other   [  ] Other explanation (Please Specify): ______________   [  ] Clinically Undetermined                                                                               Please document in your progress notes daily for the duration of treatment until resolved and include in your discharge summary.

## 2020-05-27 NOTE — PT/OT/SLP PROGRESS
"Speech Language Pathology Treatment    Patient Name:  Danyel Rangel   MRN:  [id number]  Admitting Diagnosis: Cholecystitis without cholelithiasis    Recommendations:                 General Recommendations:  follow up with diet and ensure safety/tolerance  Diet recommendations:  NPO, Liquid Diet Level: Full liquids   Aspiration Precautions: 1 bite/sip at a time, Alternating bites/sips, Assistance with meals, Avoid talking while eating, Check for pocketing/oral residue, Double swallow with each bite/sip, Eliminate distractions, Feed only when awake/alert, HOB to 90 degrees, Meds crushed in puree, Puree for pleasure, Remain upright 30 minutes post meal, Small bites/sips, Standard aspiration precautions and Wear oxygen during intake   General Precautions: Standard, fall, respiratory(confusion, agitated)  Communication strategies:  reorient, repeat info, remains confused and agitated    Subjective     Pt seen in room for repeat swallow eval. Pt with sitter present.   Patient goals: "I need to get out."    Pain/Comfort:  · Pain Rating 1: 0/10    Objective:     Has the patient been evaluated by SLP for swallowing?   Yes  Keep patient NPO? No   Current Respiratory Status: nasal cannula      Cognition/Communication: Pt found in room with sitter at bedside. Pt remains confused, disoriented to self, place and reason. Pt asking "to get up and go home." Pt attempting to grab at lines and sitter near his bed. Pt required frequent redirection to attend to SLP.     Swallowing: Pt seen in ICU room, explained why speech was back to see him. Pt initially refusing PO trials but did agree with some coaxing. SLP explained that he would need to take meds and speech needed to see how he does. Pt presented with cup/straw swallows of water x3 and applesauce by tsp swallows x4. Pt with dcr'd oral motor control of liquids, slow a/p transfer and noted to belch after liquid trials. Pt tolerated applesauce trials with double swallow noted. Pt " stated he had enough following 4 trials. Pt with slight dcr in SPO2 levels to 88%; however, pt noted to become increasingly agitated and restless in bed. REC: full liquid diet for now. SLP did secure chat MD and RN.       Assessment:     Danyel Rangel is a 85 y.o. male admitted with Cholecystitis without cholelithiasis who presents with an SLP diagnosis of increased confusion, agitation and new onset of  oral and pharyngeal dysphagia at bedside.     Goals:   Multidisciplinary Problems     SLP Goals        Problem: SLP Goal    Goal Priority Disciplines Outcome   SLP Goal     SLP Ongoing, Progressing   Description:  Short Term Goals:  1.  Pt will participate in ongoing swallow assessment to determine least restrictive diet.   2. Pt will tolerate full liquid diet with no outward s/s of dysphagia.  3. Pt will tolerate pureed trials with no outward s/s of dysphagia.  4. Pt will implement safe swallowing strategies 100% of the time given min assist.                          Plan:     · Patient to be seen:  3 x/week   · Plan of Care expires:  06/25/20  · Plan of Care reviewed with:  patient(RN, sitter at bedside)   · SLP Follow-Up:  Yes       Discharge recommendations:  (TBD)   Barriers to Discharge:  Confusion, nutrition    Time Tracking:     SLP Treatment Date:   05/27/20  Speech Start Time:  0912  Speech Stop Time:  0926     Speech Total Time (min):  14 min    Billable Minutes: Treatment Swallowing Dysfunction 14    NURA Grijalva, CCC-SLP  05/27/2020

## 2020-05-27 NOTE — ASSESSMENT & PLAN NOTE
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

## 2020-05-27 NOTE — SUBJECTIVE & OBJECTIVE
Interval History: on CRRT, awaiting final culture results, appreciate ID, gen surg and nephrology input    Review of Systems   Constitutional: Positive for activity change, chills and fatigue. Negative for fever.   Respiratory: Negative for cough and shortness of breath.    Cardiovascular: Negative for chest pain.   Genitourinary: Negative for hematuria.   Neurological: Negative for seizures and headaches.   Psychiatric/Behavioral: Negative for agitation and confusion.     Objective:     Vital Signs (Most Recent):  Temp: 98.3 °F (36.8 °C) (05/26/20 1512)  Pulse: 68 (05/26/20 1838)  Resp: (!) 32 (05/26/20 1838)  BP: 113/61 (05/26/20 1800)  SpO2: (!) 93 % (05/26/20 1838) Vital Signs (24h Range):  Temp:  [97.4 °F (36.3 °C)-98.7 °F (37.1 °C)] 98.3 °F (36.8 °C)  Pulse:  [66-85] 68  Resp:  [10-39] 32  SpO2:  [87 %-100 %] 93 %  BP: ()/() 113/61     Weight: 81.7 kg (180 lb 1.9 oz)  Body mass index is 27.39 kg/m².    Intake/Output Summary (Last 24 hours) at 5/26/2020 1902  Last data filed at 5/26/2020 1800  Gross per 24 hour   Intake 1355 ml   Output 862 ml   Net 493 ml      Physical Exam   Constitutional: He appears well-developed and well-nourished.   HENT:   Head: Atraumatic.   Pulmonary/Chest: Effort normal.   Abdominal: Soft.   Neurological: He is alert.   Psychiatric: He has a normal mood and affect.   Nursing note and vitals reviewed.      Significant Labs:   Recent Labs   Lab 05/24/20  0529 05/25/20  0450 05/26/20  0414   WBC 20.78* 22.90* 21.10*   HGB 11.5* 11.1* 10.1*   HCT 37.1* 35.2* 31.5*   * 151 162     Recent Labs   Lab 05/24/20  1534  05/25/20  2325 05/26/20  0414 05/26/20  0845 05/26/20  1423 05/26/20  1620   *   < > 135*  136 135*  135*  135* 138 137  137  --    K 5.6*  5.6*   < > 4.8  4.9 4.9  4.9  4.9 4.6 4.9  4.9  --       < > 103  103 103  103  103 104 103  103  --    CO2 23   < > 21*  22* 23  23  23 25 23  23  --    BUN 60*   < > 56*  57* 45*  45*  45*  39* 34*  34*  --    CREATININE 3.1*   < > 2.9*  2.9* 2.3*  2.3*  2.3* 2.1* 1.8*  1.8*  --    *   < > 110  111* 101  101  101 102 114*  114*  --    CALCIUM 8.3*   < > 8.4*  8.5* 8.1*  8.1*  8.1* 8.0* 8.0*  8.0*  --    MG  --    < > 2.3  --  2.4  --  2.4   PHOS  --    < > 4.2 3.5  --  3.6  --    LIPASE 30  --   --   --   --   --   --     < > = values in this interval not displayed.     Recent Labs   Lab 05/21/20  1056  05/24/20  0529 05/25/20  0450  05/25/20  2325 05/26/20  0414 05/26/20  1423   ALKPHOS 60   < > 62 52*  --   --  51*  --    ALT 16   < > 20 19  --   --  22  --    AST 27   < > 32 26  --   --  43*  --    ALBUMIN 3.8   < > 3.0* 2.6*   < > 2.4* 2.3*  2.3* 2.4*   PROT 6.6   < > 6.7 6.4  --   --  6.0  --    BILITOT 2.5*   < > 4.1* 5.6*  --   --  5.5*  --    INR 1.6*  --   --   --   --   --  1.1  --     < > = values in this interval not displayed.      Recent Labs     05/25/20  0117 05/25/20  0450 05/25/20  1225   TROPONINI 0.093* 0.094* 0.115*     Recent Labs   Lab 05/24/20  0653 05/24/20  1524 05/24/20  2308 05/25/20  0121 05/25/20  1134 05/25/20  1417   POCTGLUCOSE 164* 141* 65* 156* 140* 161*     Hemoglobin A1C   Date Value Ref Range Status   04/26/2018 5.5 4.0 - 5.6 % Final     Comment:     According to ADA guidelines, hemoglobin A1c <7.0% represents  optimal control in non-pregnant diabetic patients. Different  metrics may apply to specific patient populations.   Standards of Medical Care in Diabetes-2016.  For the purpose of screening for the presence of diabetes:  <5.7%     Consistent with the absence of diabetes  5.7-6.4%  Consistent with increasing risk for diabetes   (prediabetes)  >or=6.5%  Consistent with diabetes  Currently, no consensus exists for use of hemoglobin A1c  for diagnosis of diabetes for children.  This Hemoglobin A1c assay has significant interference with fetal   hemoglobin   (HbF). The results are invalid for patients with abnormal amounts of   HbF,    including those with known Hereditary Persistence   of Fetal Hemoglobin. Heterozygous hemoglobin variants (HbAS, HbAC,   HbAD, HbAE, HbA2) do not significantly interfere with this assay;   however, presence of multiple variants in a sample may impact the %   interference.         No results for input(s): COLORU, CLARITYU, SPECGRAV, PHUR, PROTEINUA, GLUCOSEU, BLOODU, WBCU, RBCU, BACTERIA, MUCUS in the last 24 hours.    Invalid input(s):  BILIRUBINCON    Microbiology Results (last 7 days)     Procedure Component Value Units Date/Time    Blood culture [[id number]] Collected:  05/25/20 0911    Order Status:  Completed Specimen:  Blood Updated:  05/26/20 1612     Blood Culture, Routine No Growth to date      No Growth to date    Blood culture [[id number]]  (Abnormal) Collected:  05/23/20 1650    Order Status:  Completed Specimen:  Blood from Peripheral, Antecubital, Left Updated:  05/26/20 1141     Blood Culture, Routine Gram stain vinh bottle: Gram positive cocci in chains resembling Strep       Results called to and read back by:Kathya Sanchez RN 05/24/2020  19:03      ENTEROCOCCUS FAECALIS  For susceptibility see order #[id number]      Blood culture [[id number]]  (Abnormal) Collected:  05/23/20 1700    Order Status:  Completed Specimen:  Blood from Peripheral, Antecubital, Right Updated:  05/26/20 1141     Blood Culture, Routine Gram stain vinh bottle: Gram positive cocci in chains resembling Strep       Results called to and read back by: Brenna Will RN 05/24/2020  10:57      ENTEROCOCCUS FAECALIS  Susceptibility pending            Scheduled Meds:   apixaban  2.5 mg Oral BID    ceFEPime (MAXIPIME) IVPB  1 g Intravenous Q24H    docusate sodium  100 mg Oral BID    finasteride  5 mg Oral Daily    levothyroxine  50 mcg Oral Before breakfast    metoprolol succinate  50 mg Oral Daily    simvastatin  20 mg Oral QHS    sodium phosphate IVPB  15 mmol Intravenous Once     Continuous Infusions:  As Needed:  albuterol-ipratropium, dextrose 50 % in water (D50W), haloperidol lactate, HYDROcodone-acetaminophen, ondansetron, promethazine (PHENERGAN) IVPB, [COMPLETED] Pharmacy to dose Vancomycin consult **AND** vancomycin - pharmacy to dose    Significant Imaging:   X-Ray Chest 1 View  Narrative: EXAMINATION:  XR CHEST 1 VIEW    CLINICAL HISTORY:  LSC Trialysis insertion;    TECHNIQUE:  Single frontal view of the chest was performed.    COMPARISON:  05/25/2020    FINDINGS:  Central line via a left subclavian approach terminates over the SVC.  The cardiac silhouette is unchanged.  Lung fields are unchanged noting low lung volumes and atelectasis with central vascular congestion.  No acute osseous findings.  Impression: Left subclavian approach central line terminates over the SVC.  No pneumothorax.    Electronically signed by: Jimbo French MD  Date:    05/25/2020  Time:    16:05  X-Ray Chest AP Portable  Narrative: EXAMINATION:  XR CHEST AP PORTABLE    CLINICAL HISTORY:  shortness of breath;    TECHNIQUE:  Single frontal view of the chest was performed.    COMPARISON:  05/24/2020    FINDINGS:  Hypoventilatory exam with low lung volumes.  Basilar opacities may represent atelectasis and a small amount of pleural fluid.  No evidence of pneumothorax or large pleural effusion.  Left upper lung zone and right lung zones are relatively clear.  Cardiomediastinal silhouette is stable.  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  There is atherosclerosis.  ICD in stable position.  Bones show degenerative changes.  There is mild gaseous distention of the bowel.  Impression: Hypoventilatory exam with low lung volumes.  Mild left basilar opacity may represent atelectasis and/or small amount of pleural fluid.    Electronically signed by: Shay Banda MD  Date:    05/25/2020  Time:    14:53  Echo Color Flow Doppler? Yes  Addendum: · Mildly decreased left ventricular systolic function. The estimated  ejection fraction is  45%.   · Grade III (severe) left ventricular diastolic dysfunction consistent  with restrictive physiology.   · Septal wall has abnormal motion consistent with right ventricular  pacemaker.   · Concentric left ventricular remodeling.   · Mildly to moderately reduced right ventricular systolic function.   · Severe left atrial enlargement.   · Severe tricuspid regurgitation.   · Mild-to-moderate aortic valve stenosis.   · Aortic valve area is 1.47 cm2; peak velocity is 1.45 m/s; mean gradient  is 5 mmHg.  Narrative: · Mildly decreased left ventricular systolic function. The estimated   ejection fraction is 45%.  · Grade III (severe) left ventricular diastolic dysfunction consistent   with restrictive physiology.  · Septal wall has abnormal motion consistent with right ventricular   pacemaker.  · Concentric left ventricular remodeling.  · Mildly to moderately reduced right ventricular systolic function.  · Severe left atrial enlargement.  · Severe tricuspid regurgitation.     CT Head Without Contrast  Narrative: EXAMINATION:  CT HEAD WITHOUT CONTRAST    CLINICAL HISTORY:  Confusion/delirium, altered LOC, unexplained;    TECHNIQUE:  Low dose axial images were obtained through the head.  Coronal and sagittal reformations were also performed. Contrast was not administered.    COMPARISON:  Head CT/5/21/2020    FINDINGS:  There is no acute intracranial hemorrhage, hydrocephalus, midline shift or mass effect. Brain parenchyma appears stable with findings suggestive of chronic microvascular ischemic change.  The basal cisterns are patent. The mastoid air cells and paranasal sinuses are clear of acute process. The visualized bones of the calvarium demonstrate no acute osseous abnormality.  Impression: 1. No CT evidence of acute intracranial abnormality or significant interval detrimental change from prior head CT 5/21/2020.  Clinical correlation and further evaluation as warranted.  2. Generalized cerebral volume loss and  findings suggestive of chronic microvascular ischemic change.    Electronically signed by: Trang Gauthier MD  Date:    05/25/2020  Time:    05:02  X-Ray Chest AP Portable  Narrative: EXAMINATION:  XR CHEST AP PORTABLE    CLINICAL HISTORY:  shortness of breath;    TECHNIQUE:  Single frontal view of the chest was performed.    COMPARISON:  08:48    FINDINGS:  Lungs are significantly hypoinflated.  Left basilar atelectatic changes are seen.  Heart is stable in size.  No evidence of pneumothorax or large pleural effusion.  Impression: No significant change.    Electronically signed by: Jermaine Bradley MD  Date:    05/24/2020  Time:    23:57
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
## 2020-05-27 NOTE — ASSESSMENT & PLAN NOTE
Leukocytosis  Surgery on board  GI consult- s/p ERCP with no stent placement.    5/26 awaiting final cutlres sensitvity, continue current abx  5/27 T bili is up,   Appreciate GI input  Septic met with family, might consider hospice at home on Friday if no improvement

## 2020-05-27 NOTE — PROGRESS NOTES
[facility]  Gastroenterology  Progress Note    Patient Name: Danyel Rangel  MRN: [id number]  Admission Date: 5/23/2020  Hospital Length of Stay: 1 days  Code Status: DNR   Attending Provider: Janak Kinney DO  Consulting Provider: Janice Phelps MD  Primary Care Physician: Pa Spence MD  Principal Problem: Cholecystitis without cholelithiasis      Subjective:     Interval History: Seen/examined this afternoon, somnolent. On crrt    Review of Systems   Unable to perform ROS: Mental status change     Objective:     Vital Signs (Most Recent):  Temp: 98.3 °F (36.8 °C) (05/26/20 1512)  Pulse: 70 (05/26/20 1904)  Resp: (!) 26 (05/26/20 1904)  BP: 113/61 (05/26/20 1800)  SpO2: 98 % (05/26/20 1904) Vital Signs (24h Range):  Temp:  [97.4 °F (36.3 °C)-98.3 °F (36.8 °C)] 98.3 °F (36.8 °C)  Pulse:  [66-85] 70  Resp:  [10-34] 26  SpO2:  [87 %-100 %] 98 %  BP: ()/() 113/61     Weight: 81.7 kg (180 lb 1.9 oz) (05/25/20 1445)  Body mass index is 27.39 kg/m².      Intake/Output Summary (Last 24 hours) at 5/26/2020 2057  Last data filed at 5/26/2020 1800  Gross per 24 hour   Intake 1355 ml   Output 789 ml   Net 566 ml       Lines/Drains/Airways     Central Venous Catheter Line            Trialysis (Dialysis) Catheter 05/25/20 1538 left subclavian 1 day          Drain                 Urethral Catheter 05/24/20 1720 Straight-tip 14 Fr. 2 days                Physical Exam   Constitutional: He appears well-developed.   HENT:   Head: Normocephalic and atraumatic.   Eyes: Conjunctivae are normal. Scleral icterus is present.   Cardiovascular: Normal rate. Exam reveals no friction rub.   Pulmonary/Chest: No stridor. He exhibits no tenderness.   Abdominal: Soft. He exhibits no distension. There is no guarding.   Musculoskeletal: He exhibits no tenderness or deformity.   Neurological:   Somnolent   Skin: He is not diaphoretic.   Nursing note and vitals reviewed.      Significant Labs:  CMP:   Recent Labs   Lab  05/26/20  0414  05/26/20  1423     101  101   < > 114*  114*   CALCIUM 8.1*  8.1*  8.1*   < > 8.0*  8.0*   ALBUMIN 2.3*  2.3*  --  2.4*   PROT 6.0  --   --    *  135*  135*   < > 137  137   K 4.9  4.9  4.9   < > 4.9  4.9   CO2 23  23  23   < > 23  23     103  103   < > 103  103   BUN 45*  45*  45*   < > 34*  34*   CREATININE 2.3*  2.3*  2.3*   < > 1.8*  1.8*   ALKPHOS 51*  --   --    ALT 22  --   --    AST 43*  --   --    BILITOT 5.5*  --   --     < > = values in this interval not displayed.         Significant Imaging:  Imaging results within the past 24 hours have been reviewed.    Assessment/Plan:     Abnormal finding on GI tract imaging  - s/p ERCP with sphincterotomy and stone extraction 5/24  - renal failure is concerning, on crrt  - no purulent drainage noted from cbd  - monitor lfts, transaminases normal; indirect predominant  - monitor clinically  - on abx        Thank you for your consult. I will follow-up with patient. Please contact us if you have any additional questions.    Janice Phelps MD  Gastroenterology  [facility]

## 2020-05-27 NOTE — SIGNIFICANT EVENT
The pt had an NG tube placed this afternoon, had about 300cc of coffee ground emesis.  I stopped the xeralto, added protonix, called the family.  Decision was to continue current POC and tomorrow am will plan for home with hospice

## 2020-05-27 NOTE — NURSING
Patient noted to have expiratory wheezing audible at bedside again; called RT and requested for Albuterol PRN treatment; spoke with Lamar and she informed me she would be down shortly. Dr. Andrade on unit and discussed plan of care and condition.

## 2020-05-27 NOTE — NURSING
Pt agitated and restless all night. Gave PRN medication and continuous redirection. Sitter at bedside and telesitter in room. Pt refuses oral medication. CRRT ran smooth all night netting 0. VSS.

## 2020-05-27 NOTE — ASSESSMENT & PLAN NOTE
Leukocytosis  Surgery on board  GI consult- s/p ERCP with no stent placement.    5/26 awaiting final cutlres sensitvity, continue current abx

## 2020-05-27 NOTE — PROGRESS NOTES
"Surgery follow up  /71   Pulse 69   Temp 97.3 °F (36.3 °C)   Resp (!) 36   Ht 5' 8" (1.727 m)   Wt 81.7 kg (180 lb 1.9 oz)   SpO2 98%   BMI 27.39 kg/m²   I/O last 3 completed shifts:  In: 1655 [I.V.:55; IV Piggyback:1600]  Out: 1596 [Urine:65; Other:1531]  I/O this shift:  In: -   Out: 165 [Other:165]  Some urine out put  On dialysis, labs better continue present treatment.  "

## 2020-05-27 NOTE — EICU
eICU Physician Brief Note  Called for agitation. MR Rangel is s/p ERCP For acute cholecystitis. Currently on CRRT. He has been having delirium mostly at night (sundowning), which puts him at risk of pulling his lines. A regimen of Haldol followed by Hydrocodone followed by Seroquel (each one hour apart) has helped him last night.  Labs and investigations reviewed.  Current medications reviewed.  A/P:  Delirium  - ordered Seroquel 50 mg po qhs. Can try stopping this med once out of ICU.

## 2020-05-27 NOTE — PLAN OF CARE
Problem: SLP Goal  Goal: SLP Goal  Description  Short Term Goals:  1.  Pt will participate in ongoing swallow assessment to determine least restrictive diet.   2. Pt will tolerate full liquid diet with no outward s/s of dysphagia.  3. Pt will tolerate pureed trials with no outward s/s of dysphagia.  4. Pt will implement safe swallowing strategies 100% of the time given min assist.         Outcome: Ongoing, Progressing   Re-eval of swallow this am, pt with increased confusion/agitation. Pt may initiate full liquid diet for now.

## 2020-05-27 NOTE — SUBJECTIVE & OBJECTIVE
Past Medical History:   Diagnosis Date    Atrial fibrillation     Coronary artery disease     Encounter for blood transfusion     Hyperlipidemia     Hypertension        Past Surgical History:   Procedure Laterality Date    CARDIAC CATHETERIZATION  2016    CORONARY ARTERY BYPASS GRAFT  2006    ERCP N/A 5/24/2020    Procedure: ERCP (ENDOSCOPIC RETROGRADE CHOLANGIOPANCREATOGRAPHY);  Surgeon: David Cox MD;  Location: [facility];  Service: Endoscopy;  Laterality: N/A;    TOTAL KNEE ARTHROPLASTY Left 2010            Review of patient's allergies indicates:   Allergen Reactions    Xarelto [rivaroxaban] Anaphylaxis       Medications:  Medications Prior to Admission   Medication Sig    allopurinol (ZYLOPRIM) 300 MG tablet TAKE 1 TABLET EVERY DAY    apixaban (ELIQUIS) 5 mg Tab Take 1 tablet (5 mg total) by mouth 2 (two) times daily.    finasteride (PROSCAR) 5 mg tablet Take 1 tablet (5 mg total) by mouth once daily.    furosemide (LASIX) 20 MG tablet Take 1 tablet (20 mg total) by mouth 2 (two) times daily.    levothyroxine (SYNTHROID) 50 MCG tablet TAKE 1 TABLET EVERY DAY    metoprolol succinate (TOPROL-XL) 50 MG 24 hr tablet Take 1 tablet (50 mg total) by mouth once daily.    potassium chloride SA (K-DUR,KLOR-CON) 10 MEQ tablet TAKE 1 TABLET EVERY DAY    simvastatin (ZOCOR) 20 MG tablet Take 1 tablet (20 mg total) by mouth every evening.     Antibiotics (From admission, onward)    Start     Stop Route Frequency Ordered    05/27/20 1000  cefepime 2 g in dextrose 5% 50 mL IVPB (ready to mix system)      -- IV Every 12 hours (non-standard times) 05/27/20 0925    05/24/20 1159  vancomycin - pharmacy to dose  (vancomycin IVPB)      -- IV pharmacy to manage frequency 05/24/20 1059        Antifungals (From admission, onward)    None        Antivirals (From admission, onward)    None           Immunization History   Administered Date(s) Administered    Influenza - High Dose - PF (65 years and older)  11/10/2016, 10/25/2018    Influenza - Quadrivalent - PF (6 months and older) 11/19/2019    Influenza Split 10/20/2015    Pneumococcal Conjugate - 13 Valent 11/10/2016    Tdap 05/21/2020       Family History     None        Social History     Socioeconomic History    Marital status:      Spouse name: Not on file    Number of children: Not on file    Years of education: Not on file    Highest education level: Not on file   Occupational History    Not on file   Social Needs    Financial resource strain: Not on file    Food insecurity:     Worry: Not on file     Inability: Not on file    Transportation needs:     Medical: Not on file     Non-medical: Not on file   Tobacco Use    Smoking status: Never Smoker    Smokeless tobacco: Never Used   Substance and Sexual Activity    Alcohol use: No    Drug use: No    Sexual activity: Not on file   Lifestyle    Physical activity:     Days per week: Not on file     Minutes per session: Not on file    Stress: Not on file   Relationships    Social connections:     Talks on phone: Not on file     Gets together: Not on file     Attends Restorationist service: Not on file     Active member of club or organization: Not on file     Attends meetings of clubs or organizations: Not on file     Relationship status: Not on file   Other Topics Concern    Not on file   Social History Narrative    Not on file     Review of Systems   Unable to perform ROS: Mental status change   Gastrointestinal: Positive for abdominal distention and abdominal pain.     Objective:     Vital Signs (Most Recent):  Temp: 97.9 °F (36.6 °C) (05/27/20 1110)  Pulse: 70 (05/27/20 1130)  Resp: (!) 32 (05/27/20 1130)  BP: (!) 89/49 (05/27/20 1122)  SpO2: 100 % (05/27/20 1130) Vital Signs (24h Range):  Temp:  [97.3 °F (36.3 °C)-98.3 °F (36.8 °C)] 97.9 °F (36.6 °C)  Pulse:  [] 70  Resp:  [14-70] 32  SpO2:  [74 %-100 %] 100 %  BP: ()/(49-79) 89/49     Weight: 81.7 kg (180 lb 1.9 oz)  Body  mass index is 27.39 kg/m².    Estimated Creatinine Clearance: 34.8 mL/min (A) (based on SCr of 1.5 mg/dL (H)).    Physical Exam   Constitutional: He appears well-developed and well-nourished. No distress.   HENT:   Head: Normocephalic and atraumatic.   Eyes:   Scleral icterus   Neck: Normal range of motion. Neck supple.   L IJ trialysis line   Cardiovascular: Normal rate, regular rhythm and normal heart sounds. Exam reveals no gallop and no friction rub.   No murmur heard.  Pulmonary/Chest: Effort normal and breath sounds normal. No respiratory distress. He has no wheezes. He has no rales.   NC In place   Abdominal: Soft. He exhibits distension. There is no tenderness. There is no rebound and no guarding.   Musculoskeletal: He exhibits no edema.   Soft restraints in place   Neurological:   Oriented only to self, not oriented to place, time, or situation   Skin:   Scattered ecchymosis, large ecchymosis present on R lateral thigh  Speech mumbled   Nursing note and vitals reviewed.      Significant Labs:   CBC:   Recent Labs   Lab 05/26/20 0414 05/27/20 0327   WBC 21.10* 21.95*   HGB 10.1* 9.5*   HCT 31.5* 29.4*    154     CMP:   Recent Labs   Lab 05/26/20 0414 05/26/20 2122 05/27/20 0327 05/27/20  1128   *  135*  135*   < > 136 137  137 136   K 4.9  4.9  4.9   < > 4.6 4.7  4.7 4.7     103  103   < > 101 102  102 102   CO2 23  23  23   < > 26 24  24 23     101  101   < > 125* 126*  126* 117*   BUN 45*  45*  45*   < > 28* 25*  25* 28*   CREATININE 2.3*  2.3*  2.3*   < > 1.5* 1.4  1.4 1.5*   CALCIUM 8.1*  8.1*  8.1*   < > 8.1* 8.1*  8.1* 8.0*   PROT 6.0  --   --  5.9*  --    ALBUMIN 2.3*  2.3*   < > 2.4* 2.4*  2.4* 2.4*   BILITOT 5.5*  --   --  6.2*  --    ALKPHOS 51*  --   --  56  --    AST 43*  --   --  40  --    ALT 22  --   --  22  --    ANIONGAP 9  9  9   < > 9 11  11 11   EGFRNONAA 25*  25*  25*   < > 42* 45*  45* 42*    < > = values in this interval  not displayed.     Microbiology Results (last 7 days)     Procedure Component Value Units Date/Time    Blood culture [[id number]]  (Abnormal) Collected:  05/23/20 1650    Order Status:  Completed Specimen:  Blood from Peripheral, Antecubital, Left Updated:  05/27/20 0928     Blood Culture, Routine Gram stain vinh bottle: Gram positive cocci in chains resembling Strep       Results called to and read back by:Kathya Sanchez RN 05/24/2020  19:03      ENTEROCOCCUS FAECALIS  For susceptibility see order #[id number]      Blood culture [[id number]]  (Abnormal) Collected:  05/23/20 1700    Order Status:  Completed Specimen:  Blood from Peripheral, Antecubital, Right Updated:  05/27/20 0928     Blood Culture, Routine Gram stain vinh bottle: Gram positive cocci in chains resembling Strep       Results called to and read back by: Brenna Will RN 05/24/2020  10:57      ENTEROCOCCUS FAECALIS  Susceptibility pending      Blood culture [[id number]] Collected:  05/25/20 0911    Order Status:  Completed Specimen:  Blood Updated:  05/26/20 1612     Blood Culture, Routine No Growth to date      No Growth to date        All pertinent labs within the past 24 hours have been reviewed.    Significant Imaging: I have reviewed all pertinent imaging results/findings within the past 24 hours.

## 2020-05-27 NOTE — PROGRESS NOTES
Progress Note  Nephrology      Consult Requested By: Janak Kinney DO      SUBJECTIVE:     Overnight events  Patient is a 85 y.o. male     Patient Active Problem List   Diagnosis    Chronic atrial fibrillation    HTN (hypertension), benign    Hyperlipidemia    AICD (automatic cardioverter/defibrillator) present    Coronary artery disease involving native coronary artery of native heart without angina pectoris    Hypothyroidism    Abnormal LFTs    Trigger index finger of right hand    S/P CABG (coronary artery bypass graft)    Senile purpura    CKD (chronic kidney disease), stage III    Benign prostatic hyperplasia with weak urinary stream    Chronic systolic congestive heart failure    Chronic gout without tophus    Cholecystitis with cholelithiasis    Cholecystitis without cholelithiasis    Chronic kidney disease, stage IV (severe)    Hyperkalemia    Leukocytosis    JOI (acute kidney injury)    Abnormal finding on GI tract imaging    RUQ pain    Elevated troponin    Elevated brain natriuretic peptide (BNP) level    Dysphagia    Decreased urine output    Shortness of breath    Bacteremia    Palliative care encounter    Goals of care, counseling/discussion    Counseling regarding advance care planning and goals of care    Sepsis     Past Medical History:   Diagnosis Date    Atrial fibrillation     Coronary artery disease     Encounter for blood transfusion     Hyperlipidemia     Hypertension               OBJECTIVE:     Vitals:    05/27/20 1430 05/27/20 1500 05/27/20 1529 05/27/20 1530   BP:    (!) 110/56   BP Location:       Patient Position:       Pulse: 70 70 70 70   Resp: (!) 31 (!) 30 (!) 31 (!) 28   Temp:       TempSrc:       SpO2: 100% 100% 100% 100%   Weight:       Height:           Temp: 97.9 °F (36.6 °C) (05/27/20 1110)  Pulse: 70 (05/27/20 1530)  Resp: (!) 28 (05/27/20 1530)  BP: (!) 110/56 (05/27/20 1530)  SpO2: 100 % (05/27/20 1530)    Date 05/27/20 0700 - 05/28/20  0659   Shift 6638-3100 8636-2380 1590-8096 24 Hour Total   INTAKE   IV Piggyback 300   300   Shift Total(mL/kg) 300(3.7)   300(3.7)   OUTPUT   Other 540 112  652   Shift Total(mL/kg) 540(6.6) 112(1.4)  652(8)   Weight (kg) 81.7 81.7 81.7 81.7             Medications:   albumin human 25%  12.5 g Intravenous Once    albuterol sulfate  5 mg Nebulization Once    apixaban  2.5 mg Oral BID    ceFEPime (MAXIPIME) IVPB  2 g Intravenous Q12H    docusate sodium  100 mg Oral BID    finasteride  5 mg Oral Daily    levothyroxine  50 mcg Oral Before breakfast    metoprolol succinate  50 mg Oral Daily    QUEtiapine  50 mg Oral QHS    simvastatin  20 mg Oral QHS                 Physical Exam:  NAD  Lungs: wheezes bilateral  Heart: pulse 70  Abdomen: distended  Extremities: no edema  Skin: dry  Laboratory:  ABG  Labs reviewed  Recent Results (from the past 336 hour(s))   Basic metabolic panel    Collection Time: 05/26/20  2:23 PM   Result Value Ref Range    Sodium 137 136 - 145 mmol/L    Potassium 4.9 3.5 - 5.1 mmol/L    Chloride 103 95 - 110 mmol/L    CO2 23 23 - 29 mmol/L    BUN, Bld 34 (H) 8 - 23 mg/dL    Creatinine 1.8 (H) 0.5 - 1.4 mg/dL    Calcium 8.0 (L) 8.7 - 10.5 mg/dL    Anion Gap 11 8 - 16 mmol/L   Basic metabolic panel    Collection Time: 05/26/20  8:45 AM   Result Value Ref Range    Sodium 138 136 - 145 mmol/L    Potassium 4.6 3.5 - 5.1 mmol/L    Chloride 104 95 - 110 mmol/L    CO2 25 23 - 29 mmol/L    BUN, Bld 39 (H) 8 - 23 mg/dL    Creatinine 2.1 (H) 0.5 - 1.4 mg/dL    Calcium 8.0 (L) 8.7 - 10.5 mg/dL    Anion Gap 9 8 - 16 mmol/L   Basic metabolic panel    Collection Time: 05/26/20  4:14 AM   Result Value Ref Range    Sodium 135 (L) 136 - 145 mmol/L    Potassium 4.9 3.5 - 5.1 mmol/L    Chloride 103 95 - 110 mmol/L    CO2 23 23 - 29 mmol/L    BUN, Bld 45 (H) 8 - 23 mg/dL    Creatinine 2.3 (H) 0.5 - 1.4 mg/dL    Calcium 8.1 (L) 8.7 - 10.5 mg/dL    Anion Gap 9 8 - 16 mmol/L     Recent Results (from the past 336  hour(s))   CBC auto differential    Collection Time: 05/27/20  3:27 AM   Result Value Ref Range    WBC 21.95 (H) 3.90 - 12.70 K/uL    Hemoglobin 9.5 (L) 14.0 - 18.0 g/dL    Hematocrit 29.4 (L) 40.0 - 54.0 %    Platelets 154 150 - 350 K/uL   CBC auto differential    Collection Time: 05/26/20  4:14 AM   Result Value Ref Range    WBC 21.10 (H) 3.90 - 12.70 K/uL    Hemoglobin 10.1 (L) 14.0 - 18.0 g/dL    Hematocrit 31.5 (L) 40.0 - 54.0 %    Platelets 162 150 - 350 K/uL   CBC auto differential    Collection Time: 05/25/20  4:50 AM   Result Value Ref Range    WBC 22.90 (H) 3.90 - 12.70 K/uL    Hemoglobin 11.1 (L) 14.0 - 18.0 g/dL    Hematocrit 35.2 (L) 40.0 - 54.0 %    Platelets 151 150 - 350 K/uL     Urinalysis  No results for input(s): COLORU, CLARITYU, SPECGRAV, PHUR, PROTEINUA, GLUCOSEU, BILIRUBINCON, BLOODU, WBCU, RBCU, BACTERIA, MUCUS, NITRITE, LEUKOCYTESUR, UROBILINOGEN, HYALINECASTS in the last 24 hours.    Diagnostic Results:  X-Ray: Reviewed  US: Reviewed  Echo: Reviewed  ACCESS    ASSESSMENT/PLAN:     JOI/ CKD 3b-4  Metabolic bone disease  Poor nutrition  Anemia Hb 9.5  /56  Positive fluid balance + 3,144.8  Discussed with Dr Corral   NG tube  KUB  Breathing treatment  Discussed with ICU nurse   CRRT in progress

## 2020-05-27 NOTE — HOSPITAL COURSE
5/26 pt is awake and oriented, he is on CRRT given his low BP, feeling cold, ordering warming blanket  5/27 the pt still have the blood cultures, not finalized with sensitivity results, will continue current abx. WBC still elevated to 21K  Brought the family to visit with him, grandaughter and grandson.  Discussed with nephrology,  The plan is to continue current managemetn without escalation for 2 days,  On Friday, family will come here again and decide possibly on home hospice if no improvement.  5/28 the pt is having bloody discharge from the NGtube, discussed with family, they wants home hospice as per the patint 's wishes. Placed orders for SW and palliative care.  remy mays was notified by the SW.  Family will compklete paper work with hospice.  I discussed with family in the ICU unit, they are all in agreement.  arrangemet will be made for home hospice.

## 2020-05-27 NOTE — ASSESSMENT & PLAN NOTE
85 y.o. M with a PMHx of CAD s/p CABG (2006), HFrEF (s/p AICD, EF 45%), a fib on eliquis, HLD, HTN, CKD3 who presented to the ED on 5/23 with severe RUQ pain and R chest pain with deep breaths but no n/v/f/c, CT A/P obtained 5/22 with gallstones in gallbladder and CBD with no evident ductal dilatation, possible inflammation around pancreas as well. Notable labs on admission WBC 20.47, T bili 4.1, lipase WNL, covid negative. Started on zosyn 5/23 and blood cultures obtained, now growing enterococcus species, ampicillin sensitive further susceptibilities pending. Vanc added 5/24. S/p ERCP with GI on 5/24 with sphincterotomy and stone exctraction. 5/26 ID consulted and due to worsening renal function recommended switching from vanc/zosyn to vanc/cefepime. On 5/26 zosyn discontinued and cefepime added. On 5/26 transferred to ICU for worsening confusion and started on CRRT.   Remains on vanc, cefepime with waxing and waning confusion.  Recommendations:   - Called micro lab and enterococcus is ampicillin-sensitive, further susceptibilities pending  - Recommend switching to unasyn, will give good anaerobic coverage as well as cover the enterococcus  - Continue to follow cultures and sensitivities, we will follow along with you

## 2020-05-27 NOTE — NURSING
Patient noted to be agitated with rudy Rico at bedside; states patient is trying to pull everything off and reaching for Trialysis.  I also noted upon walking into the room that patient is agitated and stating he wants to leave and pulling at [place] and leads on chest. Notified Dr. Kinney over phone regarding above information as well as holding PO metoprolol this am due to SBP in 90s and pulling fluid off with CRRT. New orders received for restraints and he informed me he would place order for agitation. Will continue to monitor.

## 2020-05-27 NOTE — ASSESSMENT & PLAN NOTE
- s/p ERCP with sphincterotomy and stone extraction 5/24  - renal failure is concerning, on crrt  - no purulent drainage noted from cbd  - monitor lfts, transaminases normal; indirect predominant  - monitor clinically  - on abx

## 2020-05-28 PROBLEM — Z51.5 PALLIATIVE CARE ENCOUNTER: Status: RESOLVED | Noted: 2020-01-01 | Resolved: 2020-01-01

## 2020-05-28 PROBLEM — R06.02 SHORTNESS OF BREATH: Status: RESOLVED | Noted: 2020-01-01 | Resolved: 2020-01-01

## 2020-05-28 NOTE — TELEPHONE ENCOUNTER
Dr Spence,  STONEY, please read enclosed message and advice,    Thanks   Returned call back to Pt's granddaughter, and forward message to PCP

## 2020-05-28 NOTE — PLAN OF CARE
[facility]  Department of Hospital Medicine  [address] [place] [address]  [phone] after hours  [phone] fax    HOSPICE  ORDERS    05/28/2020    Admit to Hospice:  Home Service    Diagnoses:   Active Hospital Problems    Diagnosis  POA    *Cholecystitis without cholelithiasis [K81.9]  Yes     Priority: 1 - High    Bacteremia [R78.81]  Yes     Priority: 2     Elevated troponin [R79.89]  Yes    Elevated brain natriuretic peptide (BNP) level [R79.89]  Yes    Dysphagia [R13.10]  Yes    Decreased urine output [R34]  Yes    Goals of care, counseling/discussion [Z71.89]  Not Applicable    Counseling regarding advance care planning and goals of care [Z71.89]  Not Applicable    Sepsis [A41.9]  Yes    Hyperkalemia [E87.5]  Yes    Leukocytosis [D72.829]  Yes    JOI (acute kidney injury) [N17.9]  Yes    Abnormal finding on GI tract imaging [R93.3]  Yes    RUQ pain [R10.11]  Yes    Chronic systolic congestive heart failure [I50.22]  Yes    CKD (chronic kidney disease), stage III [N18.3]  Yes    Benign prostatic hyperplasia with weak urinary stream [N40.1, R39.12]  Yes    S/P CABG (coronary artery bypass graft) [Z95.1]  Not Applicable    Hypothyroidism [E03.9]  Yes    Coronary artery disease involving native coronary artery of native heart without angina pectoris [I25.10]  Yes    Chronic atrial fibrillation [I48.20]  Yes    HTN (hypertension), benign [I10]  Yes    Hyperlipidemia [E78.5]  Yes    AICD (automatic cardioverter/defibrillator) present [Z95.810]  Yes      Resolved Hospital Problems    Diagnosis Date Resolved POA    Shortness of breath [R06.02] 05/28/2020 Yes    Palliative care encounter [Z51.5] 05/28/2020 Not Applicable       Hospice Qualifying Diagnoses:        Patient has a life expectancy < 6 months due to:  1) Primary Hospice Diagnosis: sepsis secondary to cholecyctitis and bacteremia  2) Comorbid Conditions Contributing to Decline:    3) Renal failure, chronic atrial fibrillation,  CAD S/P CABG and AICD placement    Vital Signs: Routine per Hospice Protocol.    Code Status: DNR    Allergies:   Review of patient's allergies indicates:   Allergen Reactions    Xarelto [rivaroxaban] Anaphylaxis       Diet: regular diet for pleasure feeding as tolerated    Activities: As tolerated    Nursing: Per Hospice Routine      NG tube care: keep for decompression and palliative comfort.  Clean site daily.  Monitor skin integrity.    Oleary Care: Empty Oleary bag Q shift and PRN.  Change Oleary every month.    Routine Skin for Bedridden Patients: Apply moisture barrier cream to all skin folds and   wet areas in perineal area daily and after baths and all bowel movements.    Oxygen: 2 L keep for o2 sat above 90%    Medications:     Danyel Rangel   Home Medication Instructions YEN:[id number]    Printed on:05/28/20 1118   Medication Information                      albuterol-ipratropium (DUO-NEB) 2.5 mg-0.5 mg/3 mL nebulizer solution  Take 3 mLs by nebulization every 4 (four) hours as needed for Wheezing. Rescue             HYDROcodone-acetaminophen (NORCO) 5-325 mg per tablet  Take 1 tablet by mouth every 4 (four) hours as needed.                   Future Orders:  Hospice Medical Director may dictate new orders for comfortable care measures & sign death certificate.        _________________________________  Janak Kinney DO  05/28/2020

## 2020-05-28 NOTE — ASSESSMENT & PLAN NOTE
CKD (chronic kidney disease), stage III  Hyperkalemia  Avoid nephrotoxic medication  Strict input/ output  Renally dose of medication  Gentle rehydration  Treat hyperk  Consult nephrology- appreciates rec's  Kidney USS with bilateral perinephric stranding.  continue Zosyn  5/28 pt is to be discharged home today with home hospice.  Comfort measures.  Pleasure feeding  NG tube to decompress/palliaitive approach.

## 2020-05-28 NOTE — ASSESSMENT & PLAN NOTE
5/28 pt is to be discharged home today with home hospice.  Comfort measures.  Pleasure feeding  NG tube to decompress/palliaitive approach.

## 2020-05-28 NOTE — ASSESSMENT & PLAN NOTE
Worsening renal function  5/28 pt is to be discharged home today with home hospice.  Comfort measures.  Pleasure feeding  NG tube to decompress/palliaitive approach.

## 2020-05-28 NOTE — ASSESSMENT & PLAN NOTE
- s/p ERCP with sphincterotomy and stone extraction 5/24  - no purulent drainage noted from cbd  - monitor lfts  - plan for hospice noted, please call if we may be of any assistance

## 2020-05-28 NOTE — PLAN OF CARE
05/28/20 1113   Post-Acute Status   Post-Acute Authorization Hospice   Hospice Status Awaiting Orders for Hospice   The Sw met with Sari at [facility] and she met with the family and all the consents have been signed. She will notify the Sw once all the dme has been delivered so the ambulance transport can be arranged by this Sw. The pt and his family want the pt to d/c home with home hospice. The Sw spoke to Dr. Kinney and he will write the d/c orders. Sari states she has Epic access and she will be able to extract the d/c orders. The Sw notified the pt's nurse of this info mentioned above.

## 2020-05-28 NOTE — SUBJECTIVE & OBJECTIVE
Subjective:     Interval History: More somnolent today, some low BPs.     Review of Systems   Unable to perform ROS: Mental status change     Objective:     Vital Signs (Most Recent):  Temp: 98 °F (36.7 °C) (05/28/20 1227)  Pulse: 70 (05/28/20 1300)  Resp: 15 (05/28/20 1300)  BP: (!) 81/51 (05/28/20 1300)  SpO2: (!) 93 % (05/28/20 1300) Vital Signs (24h Range):  Temp:  [97.7 °F (36.5 °C)-98.9 °F (37.2 °C)] 98 °F (36.7 °C)  Pulse:  [69-77] 70  Resp:  [13-45] 15  SpO2:  [92 %-100 %] 93 %  BP: ()/() 81/51     Weight: 81.7 kg (180 lb 1.9 oz) (05/25/20 1445)  Body mass index is 27.39 kg/m².      Intake/Output Summary (Last 24 hours) at 5/28/2020 1304  Last data filed at 5/28/2020 1300  Gross per 24 hour   Intake 848.33 ml   Output 3008 ml   Net -2159.67 ml       Lines/Drains/Airways     Central Venous Catheter Line            Trialysis (Dialysis) Catheter 05/25/20 1538 left subclavian 2 days          Drain                 Urethral Catheter 05/24/20 1720 Straight-tip 14 Fr. 3 days         NG/OG Tube 05/27/20 1700 nasogastric 16 Fr. Right nostril less than 1 day                Physical Exam   Constitutional: He appears well-developed.   HENT:   Head: Normocephalic and atraumatic.   Eyes: Conjunctivae are normal. Scleral icterus is present.   Pulmonary/Chest: He exhibits no tenderness.   Tachypneic   Abdominal: Soft. He exhibits no distension. There is no guarding.   Musculoskeletal: He exhibits no tenderness or deformity.   Neurological:   Somnolent   Skin: He is not diaphoretic.   +ecchymoses   Nursing note and vitals reviewed.      Significant Labs:  CMP:   Recent Labs   Lab 05/27/20  0327  05/28/20  0818   *  126*   < > 91  91   CALCIUM 8.1*  8.1*   < > 8.3*  8.3*   ALBUMIN 2.4*  2.4*   < > 2.6*  2.6*   PROT 5.9*  --   --      137   < > 137  137   K 4.7  4.7   < > 4.5  4.5   CO2 24  24   < > 24  24     102   < > 102  102   BUN 25*  25*   < > 32*  32*   CREATININE 1.4   1.4   < > 1.7*  1.7*   ALKPHOS 56  --   --    ALT 22  --   --    AST 40  --   --    BILITOT 6.2*  --   --     < > = values in this interval not displayed.         Significant Imaging:  Imaging results within the past 24 hours have been reviewed.

## 2020-05-28 NOTE — PLAN OF CARE
05/28/20 1258   Final Note   Assessment Type Final Discharge Note   Anticipated Discharge Disposition HospiceHome   What phone number can be called within the next 1-3 days to see how you are doing after discharge? [phone]   Hospital Follow Up  Appt(s) scheduled? No   Discharge plans and expectations educations in teach back method with documentation complete? No   Right Care Referral Info   Post Acute Recommendation Other   Referral Type home hospice    Facility Name [facility]

## 2020-05-28 NOTE — TELEPHONE ENCOUNTER
----- Message from Nena Chen sent at 5/28/2020 12:32 PM CDT -----  Contact: Nyla Moctezuma (granddaughter)  Nyla would like to let  know the pt is coming home with hospice from the ICU. The pt suffered kidney failure, infection in blood stream, and fluid in his lungs. If needed please call [phone] to discuss.

## 2020-05-28 NOTE — NURSING
Increased agitation. PRN haldol given and still agitated. KEMAL Schmitt stated okay to give phenergan 12.5mg. Will give and continue to monitor.

## 2020-05-28 NOTE — PLAN OF CARE
Infectious Diseases Plan of Care:    Patient is going home to hospice without IV access. Given blood cultures growing amp-sensitive enterococcus can discharge on Augmentin to try to prevent sepsis, but will defer to primary team.    Thank you for this consult. We will sign off.    Kelli Jimenez, -[facility]

## 2020-05-28 NOTE — PT/OT/SLP PROGRESS
"Speech Language Pathology Treatment    Patient Name:  Danyel Rangel   MRN:  [id number]  Admitting Diagnosis: Cholecystitis without cholelithiasis    Recommendations:                 General Recommendations:  follow up with diet and ensure safety/tolerance  Diet recommendations: Liquid Diet Level: Full liquids   Aspiration Precautions: 1 bite/sip at a time, Alternating bites/sips, Assistance with meals, Avoid talking while eating, Check for pocketing/oral residue, Double swallow with each bite/sip, Eliminate distractions, Feed only when awake/alert, HOB to 90 degrees, Meds crushed in puree, Puree for pleasure, Remain upright 30 minutes post meal, Small bites/sips, Standard aspiration precautions and Wear oxygen during intake   General Precautions: Standard, fall, respiratory(confusion, agitated)  Communication strategies:  reorient, repeat info, remains confused and agitated    Subjective     Pt seen at the bedside for ongoing swallow assessment. SLP did check w/ RNAnn-Marie, prior to visit. Pt asleep upon entry though awakened to verbal stimuli. Pt pleasant and agreeable to tx. He appeared slightly agitated and frequently requested to get out of soft wrist restraints.   Patient goals: "I wanna get these off" re: wrist restraints.     Pain/Comfort:  · Pain Rating 1: (Pt appeared uncomfortable in bed; constantly fidgeting)    Objective:     Has the patient been evaluated by SLP for swallowing?   Yes  Keep patient NPO? No   Current Respiratory Status: nasal cannula      Pt's HOB elevated to 90*. NG tube present with bloody discharge observed. He tolerated tsp bites jello x7, tsp bites pudding x4, and straw sips juice x1. Good tolerance of jello and puree though immediate coughing observed after self regulated straw sip. O2 sats ranged from 100-90. Cough then produced x2 with following trials of various consistencies. Elyse PATHAK, reported pt produced intermittent coughing throughout the day without food/liquid presence. " Pt requested SLP terminate trials 2/2 fullness. He requested something for his dry lips. Lip ointment administered. Pt left upright with soft wrist restraints and NG tube in place. Per RN, wrist restraints and NG tube to be removed shortly as he is pending d/c home. Results of session reviewed with PCT, Elyse, who is acting as pt's sitter.     Assessment:     Danyel Rangel is a 85 y.o. male with an SLP diagnosis of Dysphagia.  He presents with good tolerance of puree and jello trials this date though intermittent coughing observed with thin liquids of larger quanitites. Pt to continue on Full Liquid diet given 1:1 assist while following all standard aspiration precautions including slow rate of intake, small bites/sips, alternating bites/sips, crush meds, pinched straw sips, and monitoring for any overt s/s of aspiration. SLP to f/u while inpatient to ensure swallow safety and advance diet as able.    Goals:   Multidisciplinary Problems     SLP Goals        Problem: SLP Goal    Goal Priority Disciplines Outcome   SLP Goal     SLP Ongoing, Progressing   Description:  Short Term Goals:  1.  Pt will participate in ongoing swallow assessment to determine least restrictive diet.   2. Pt will tolerate full liquid diet with no outward s/s of dysphagia.  3. Pt will tolerate pureed trials with no outward s/s of dysphagia.  4. Pt will implement safe swallowing strategies 100% of the time given min assist.                          Plan:     · Patient to be seen:  3 x/week   · Plan of Care expires:  06/25/20  · Plan of Care reviewed with:  patient(RN)   · SLP Follow-Up:  Yes       Discharge recommendations:  (TBD)   Barriers to Discharge:  None    Time Tracking:     SLP Treatment Date:   05/28/20  Speech Start Time:  1129  Speech Stop Time:  1139     Speech Total Time (min):  10 min    Billable Minutes: Treatment Swallowing Dysfunction 10 mins    Paige Sultana CCC-SLP  05/28/2020

## 2020-05-28 NOTE — PROGRESS NOTES
[facility]  Gastroenterology  Progress Note    Patient Name: Danyel Rangel  MRN: [id number]  Admission Date: 5/23/2020  Hospital Length of Stay: 3 days  Code Status: DNR   Attending Provider: Janak Kinney DO  Consulting Provider: Janice Phelps MD  Primary Care Physician: Pa Spence MD  Principal Problem: Cholecystitis without cholelithiasis      Subjective:     Interval History: More somnolent today, some low BPs.     Review of Systems   Unable to perform ROS: Mental status change     Objective:     Vital Signs (Most Recent):  Temp: 98 °F (36.7 °C) (05/28/20 1227)  Pulse: 70 (05/28/20 1300)  Resp: 15 (05/28/20 1300)  BP: (!) 81/51 (05/28/20 1300)  SpO2: (!) 93 % (05/28/20 1300) Vital Signs (24h Range):  Temp:  [97.7 °F (36.5 °C)-98.9 °F (37.2 °C)] 98 °F (36.7 °C)  Pulse:  [69-77] 70  Resp:  [13-45] 15  SpO2:  [92 %-100 %] 93 %  BP: ()/() 81/51     Weight: 81.7 kg (180 lb 1.9 oz) (05/25/20 1445)  Body mass index is 27.39 kg/m².      Intake/Output Summary (Last 24 hours) at 5/28/2020 1304  Last data filed at 5/28/2020 1300  Gross per 24 hour   Intake 848.33 ml   Output 3008 ml   Net -2159.67 ml       Lines/Drains/Airways     Central Venous Catheter Line            Trialysis (Dialysis) Catheter 05/25/20 1538 left subclavian 2 days          Drain                 Urethral Catheter 05/24/20 1720 Straight-tip 14 Fr. 3 days         NG/OG Tube 05/27/20 1700 nasogastric 16 Fr. Right nostril less than 1 day                Physical Exam   Constitutional: He appears well-developed.   HENT:   Head: Normocephalic and atraumatic.   Eyes: Conjunctivae are normal. Scleral icterus is present.   Pulmonary/Chest: He exhibits no tenderness.   Tachypneic   Abdominal: Soft. He exhibits no distension. There is no guarding.   Musculoskeletal: He exhibits no tenderness or deformity.   Neurological:   Somnolent   Skin: He is not diaphoretic.   +ecchymoses   Nursing note and vitals reviewed.      Significant  Labs:  CMP:   Recent Labs   Lab 05/27/20  0327  05/28/20  0818   *  126*   < > 91  91   CALCIUM 8.1*  8.1*   < > 8.3*  8.3*   ALBUMIN 2.4*  2.4*   < > 2.6*  2.6*   PROT 5.9*  --   --      137   < > 137  137   K 4.7  4.7   < > 4.5  4.5   CO2 24  24   < > 24  24     102   < > 102  102   BUN 25*  25*   < > 32*  32*   CREATININE 1.4  1.4   < > 1.7*  1.7*   ALKPHOS 56  --   --    ALT 22  --   --    AST 40  --   --    BILITOT 6.2*  --   --     < > = values in this interval not displayed.         Significant Imaging:  Imaging results within the past 24 hours have been reviewed.    Assessment/Plan:     Abnormal finding on GI tract imaging  - s/p ERCP with sphincterotomy and stone extraction 5/24  - no purulent drainage noted from cbd  - monitor lfts  - plan for hospice noted, please call if we may be of any assistance        Thank you for your consult. I will follow-up with patient. Please contact us if you have any additional questions.    Janice Phelps MD  Gastroenterology  [facility]

## 2020-05-28 NOTE — ASSESSMENT & PLAN NOTE
Hyperlipidemia  Continues statin  Continue Metoprolol,   Hold Lasix    5/28 pt is to be discharged home today with home hospice.  Comfort measures.  Pleasure feeding  NG tube to decompress/palliaitive approach.

## 2020-05-28 NOTE — ASSESSMENT & PLAN NOTE
Blood cx with enterococcus resembling strep- continue Vancomycin  Repeat blood cx  Consult ID  5/26 continue current vanc and cefepime  5/27 continue current abx regimen  Poor prognosis with LFT elevation n and renalk failure  5/28 pt is to be discharged home today with home hospice.  Comfort measures.  Pleasure feeding  NG tube to decompress/palliaitive approach.

## 2020-05-28 NOTE — ASSESSMENT & PLAN NOTE
Leukocytosis  Surgery on board  GI consult- s/p ERCP with no stent placement.    5/26 awaiting final cutlres sensitvity, continue current abx  5/27 T bili is up,   Appreciate GI input  Septic met with family, might consider hospice at home on Friday if no improvement  5/28 pt is to be discharged home today with home hospice.  Comfort measures.  Pleasure feeding  NG tube to decompress/palliaitive approach.

## 2020-05-28 NOTE — PLAN OF CARE
Pt is awake and alert. Oriented to self. Restless at times. No complaints of nausea, vomiting, or diarrhea. Pt complained of pain and morphine and norco given. CRRT running per order. NG tube on low continuous wall suction and draining coffee ground substance. Haldol given for increased restlessness and agitation. Safety precautions maintained. Tolerated all medications well.

## 2020-05-28 NOTE — ASSESSMENT & PLAN NOTE
Continue metoprolol  Eliquis was on hold for procedure, will restart at lower dose due on hold   5/28 pt is to be discharged home today with home hospice.  Comfort measures.  Pleasure feeding  NG tube to decompress/palliaitive approach.

## 2020-05-28 NOTE — PLAN OF CARE
Problem: SLP Goal  Goal: SLP Goal  Description  Short Term Goals:  1.  Pt will participate in ongoing swallow assessment to determine least restrictive diet.   2. Pt will tolerate full liquid diet with no outward s/s of dysphagia.  3. Pt will tolerate pureed trials with no outward s/s of dysphagia.  4. Pt will implement safe swallowing strategies 100% of the time given min assist.   Outcome: Ongoing, Progressing   Pt seen for ongoing swallow assessment. He trialed thin liquids and puree textures with intermittent coughing. O2 sats ranged between 100-90. Bloody discharge observed in NG tube. He will continue to benefit from skilled SLP tx while inpatient to ensure swallow safety.

## 2020-05-28 NOTE — DISCHARGE SUMMARY
[facility] Hospital Medicine  Discharge Summary      Patient Name: Danyel Rangel  MRN: [id number]  Admission Date: 5/23/2020  Hospital Length of Stay: 3 days  Discharge Date and Time: No discharge date for patient encounter.  Attending Physician: Janak Kinney DO   Discharging Provider: Janak Kinney DO  Primary Care Provider: Pa Spence MD      HPI:   Danyel Rangel is an 84 y/o M with PMH of Afib, CAD, HTN, and HLD admitted by surgery with cholecystitis without cholelithiasis and SOB. Noted RUQ pain with deep breath denies nausea, chills fever or vomiting. Hospital medicine has been consulted for medical management.    Procedure(s) (LRB):  ERCP (ENDOSCOPIC RETROGRADE CHOLANGIOPANCREATOGRAPHY) (N/A)      Hospital Course:   5/26 pt is awake and oriented, he is on CRRT given his low BP, feeling cold, ordering warming blanket  5/27 the pt still have the blood cultures, not finalized with sensitivity results, will continue current abx. WBC still elevated to 21K  Brought the family to visit with him, grandaudavidter and grandson.  Discussed with nephrology,  The plan is to continue current managemetn without escalation for 2 days,  On Friday, family will come here again and decide possibly on home hospice if no improvement.  5/28 the pt is having bloody discharge from the NGtube, discussed with family, they wants home hospice as per the patint 's wishes. Placed orders for SW and palliative care.  remy mays was notified by the SW.  Family will compklete paper work with hospice.  I discussed with family in the ICU unit, they are all in agreement.  arrangemet will be made for home hospice.     Consults:   Consults (From admission, onward)        Status Ordering Provider     Inpatient consult to [facility]ology[facility]  Once     Provider:  (Not yet assigned)    Completed JASMIN WATERS     Inpatient consult to General Surgery  Once     Provider:  Jasmin Waters MD    Acknowledged  LISA ESCAMILLA     Inpatient consult to Hospital Medicine[facility]  Once     Provider:  (Not yet assigned)    Acknowledged JASMIN WATERS     Inpatient consult to Infectious Diseases  Once     Provider:  Drew Pineda MD    Completed CHRISTIAN MCDANIELEOMA NAnnika     Inpatient consult to Nephrology  Once     Provider:  Lisa Escamilla MD    Completed JASMIN WATERS     Inpatient consult to Palliative Care  Once     Provider:  Nelida Mcdaniel MD    Completed INNOCENTDebITCHRISTIAN BARRERAEOMA NAnnika     Inpatient consult to Social Work  Once     Provider:  (Not yet assigned)    Acknowledged GARRETT DUNN     Pharmacy to dose Vancomycin consult  Once     Provider:  (Not yet assigned)    Completed CHRISTIAN MCDANIELEOMA NAnnika     Pharmacy to dose Vancomycin consult  Once     Provider:  (Not yet assigned)    Acknowledged CHRISTIAN MCDANIELEOMA JOSÉ MIGUEL.          * Cholecystitis without cholelithiasis  Leukocytosis  Surgery on board  GI consult- s/p ERCP with no stent placement.    5/26 awaiting final cutlres sensitvity, continue current abx  5/27 T bili is up,   Appreciate GI input  Septic met with family, might consider hospice at home on Friday if no improvement  5/28 pt is to be discharged home today with home hospice.  Comfort measures.  Pleasure feeding  NG tube to decompress/palliaitive approach.    Bacteremia  Blood cx with enterococcus resembling strep- continue Vancomycin  Repeat blood cx  Consult ID  5/26 continue current vanc and cefepime  5/27 continue current abx regimen  Poor prognosis with LFT elevation n and renalk failure  5/28 pt is to be discharged home today with home hospice.  Comfort measures.  Pleasure feeding  NG tube to decompress/palliaitive approach.    Sepsis    5/28 pt is to be discharged home today with home hospice.  Comfort measures.  Pleasure feeding  NG tube to decompress/palliaitive approach.    Counseling regarding advance care planning and goals of care    5/28 pt is to be discharged  home today with home hospice.  Comfort measures.  Pleasure feeding  NG tube to decompress/palliaitive approach.    Goals of care, counseling/discussion    5/28 pt is to be discharged home today with home hospice.  Comfort measures.  Pleasure feeding  NG tube to decompress/palliaitive approach.    Decreased urine output    5/28 pt is to be discharged home today with home hospice.  Comfort measures.  Pleasure feeding  NG tube to decompress/palliaitive approach.    JOI (acute kidney injury)  CKD (chronic kidney disease), stage III  Hyperkalemia  Avoid nephrotoxic medication  Strict input/ output  Renally dose of medication  Gentle rehydration  Treat hyperk  Consult nephrology- appreciates rec's  Kidney USS with bilateral perinephric stranding.  continue Zosyn  5/28 pt is to be discharged home today with home hospice.  Comfort measures.  Pleasure feeding  NG tube to decompress/palliaitive approach.      CKD (chronic kidney disease), stage III  Worsening renal function  5/28 pt is to be discharged home today with home hospice.  Comfort measures.  Pleasure feeding  NG tube to decompress/palliaitive approach.    HTN (hypertension), benign  Hyperlipidemia  Continues statin  Continue Metoprolol,   Hold Lasix    5/28 pt is to be discharged home today with home hospice.  Comfort measures.  Pleasure feeding  NG tube to decompress/palliaitive approach.    Chronic atrial fibrillation  Continue metoprolol  Eliquis was on hold for procedure, will restart at lower dose due on hold   5/28 pt is to be discharged home today with home hospice.  Comfort measures.  Pleasure feeding  NG tube to decompress/palliaitive approach.      Final Active Diagnoses:    Diagnosis Date Noted POA    PRINCIPAL PROBLEM:  Cholecystitis without cholelithiasis [K81.9] 05/23/2020 Yes    Bacteremia [R78.81] 05/25/2020 Yes    Elevated troponin [R79.89] 05/25/2020 Yes    Elevated brain natriuretic peptide (BNP) level [R79.89] 05/25/2020 Yes    Dysphagia  [R13.10] 05/25/2020 Yes    Decreased urine output [R34] 05/25/2020 Yes    Goals of care, counseling/discussion [Z71.89] 05/25/2020 Not Applicable    Counseling regarding advance care planning and goals of care [Z71.89] 05/25/2020 Not Applicable    Sepsis [A41.9] 05/25/2020 Yes    Hyperkalemia [E87.5] 05/24/2020 Yes    Leukocytosis [D72.829] 05/24/2020 Yes    JOI (acute kidney injury) [N17.9] 05/24/2020 Yes    Abnormal finding on GI tract imaging [R93.3] 05/24/2020 Yes    RUQ pain [R10.11] 05/24/2020 Yes    Chronic systolic congestive heart failure [I50.22] 05/22/2020 Yes    CKD (chronic kidney disease), stage III [N18.3] 10/25/2018 Yes    Benign prostatic hyperplasia with weak urinary stream [N40.1, R39.12] 10/25/2018 Yes    S/P CABG (coronary artery bypass graft) [Z95.1] 01/27/2017 Not Applicable    Hypothyroidism [E03.9] 11/10/2016 Yes    Coronary artery disease involving native coronary artery of native heart without angina pectoris [I25.10] 10/14/2016 Yes    Chronic atrial fibrillation [I48.20] 06/13/2016 Yes    HTN (hypertension), benign [I10] 06/13/2016 Yes    Hyperlipidemia [E78.5] 06/13/2016 Yes    AICD (automatic cardioverter/defibrillator) present [Z95.810] 06/13/2016 Yes      Problems Resolved During this Admission:    Diagnosis Date Noted Date Resolved POA    Shortness of breath [R06.02] 05/25/2020 05/28/2020 Yes    Palliative care encounter [Z51.5] 05/25/2020 05/28/2020 Not Applicable       Discharged Condition: poor prognosis. pt is being discharged to home with home hospice care.    Disposition: Hospice/Home    Follow Up:    Patient Instructions:      Diet Dysphagia Mechanical Soft   Scheduling Instructions: Pleasure feed as tolerated     Activity as tolerated       Significant Diagnostic Studies: Labs:   BMP:   Recent Labs   Lab 05/27/20  2106 05/27/20  2346 05/28/20  0818   * 120* 91  91   * 135* 137  137   K 4.5 4.5 4.5  4.5    102 102  102   CO2 21* 21*  24  24   BUN 29* 29* 32*  32*   CREATININE 1.6* 1.6* 1.7*  1.7*   CALCIUM 8.1* 8.1* 8.3*  8.3*   MG 2.5 2.6 2.8*  2.8*   , CMP   Recent Labs   Lab 05/27/20  0327  05/27/20  2106 05/27/20  2346 05/28/20  0818     137   < > 134* 135* 137  137   K 4.7  4.7   < > 4.5 4.5 4.5  4.5     102   < > 100 102 102  102   CO2 24  24   < > 21* 21* 24  24   *  126*   < > 121* 120* 91  91   BUN 25*  25*   < > 29* 29* 32*  32*   CREATININE 1.4  1.4   < > 1.6* 1.6* 1.7*  1.7*   CALCIUM 8.1*  8.1*   < > 8.1* 8.1* 8.3*  8.3*   PROT 5.9*  --   --   --   --    ALBUMIN 2.4*  2.4*   < > 2.5* 2.6* 2.6*  2.6*   BILITOT 6.2*  --   --   --   --    ALKPHOS 56  --   --   --   --    AST 40  --   --   --   --    ALT 22  --   --   --   --    ANIONGAP 11  11   < > 13 12 11  11   ESTGFRAFRICA 53*  53*   < > 45* 45* 42*  42*   EGFRNONAA 45*  45*   < > 39* 39* 36*  36*    < > = values in this interval not displayed.    and CBC   Recent Labs   Lab 05/27/20 0327 05/27/20  1752 05/28/20  0350   WBC 21.95* 21.01* 20.77*  20.77*   HGB 9.5* 8.9* 8.9*  8.9*   HCT 29.4* 27.8* 27.6*  27.6*    129* 131*  131*       Pending Diagnostic Studies:     Procedure Component Value Units Date/Time    FL ERCP Biliary And Pancreatic By Rad Tech [[id number]] Resulted:  05/24/20 1757    Order Status:  Sent Lab Status:  In process Updated:  05/24/20 1905    Magnesium [[id number]]     Order Status:  Sent Lab Status:  No result     Specimen:  Blood     Renal function panel [[id number]]     Order Status:  Sent Lab Status:  No result     Specimen:  Blood     X-Ray Chest 1 View [[id number]]     Order Status:  Sent Lab Status:  No result     X-Ray KUB [[id number]] Resulted:  05/27/20 1646    Order Status:  Sent Lab Status:  No result Updated:  05/27/20 1908         Medications:  Reconciled Home Medications:      Medication List      START taking these medications    albuterol-ipratropium 2.5 mg-0.5 mg/3 mL nebulizer  solution  Commonly known as:  DUO-NEB  Take 3 mLs by nebulization every 4 (four) hours as needed for Wheezing. Rescue     HYDROcodone-acetaminophen 5-325 mg per tablet  Commonly known as:  NORCO  Take 1 tablet by mouth every 4 (four) hours as needed.        STOP taking these medications    allopurinoL 300 MG tablet  Commonly known as:  ZYLOPRIM     apixaban 5 mg Tab  Commonly known as:  ELIQUIS     finasteride 5 mg tablet  Commonly known as:  PROSCAR     furosemide 20 MG tablet  Commonly known as:  LASIX     levothyroxine 50 MCG tablet  Commonly known as:  SYNTHROID     metoprolol succinate 50 MG 24 hr tablet  Commonly known as:  TOPROL-XL     potassium chloride SA 10 MEQ tablet  Commonly known as:  K-DUR,KLOR-CON     simvastatin 20 MG tablet  Commonly known as:  ZOCOR            Indwelling Lines/Drains at time of discharge:   Lines/Drains/Airways     Central Venous Catheter Line            Trialysis (Dialysis) Catheter 05/25/20 1538 left subclavian 2 days          Drain                 Urethral Catheter 05/24/20 1720 Straight-tip 14 Fr. 3 days         NG/OG Tube 05/27/20 1700 nasogastric 16 Fr. Right nostril less than 1 day                Time spent on the discharge of patient: 59 minutes  Patient was seen and examined on the date of discharge and determined to be suitable for discharge with home hospice.    Critical care time spent on the evaluation and treatment of severe organ dysfunction, review of pertinent labs and imaging studies, discussions with consulting providers and discussions with patient/family: 102 minutes.     Janak Kinney DO  Department of Hospital Medicine  [facility]

## 2020-05-28 NOTE — PLAN OF CARE
05/28/20 1252   Post-Acute Status   Post-Acute Authorization Hospice   Hospice Status Set-up Complete   The Sw received a call from Sari at [facility] stating the transportation can be arranged b/c the dme has arrived. The Sw spoke to the pt's nurse and arranged the [facility] ambulance transport for 2pm. The Sw requested a 2pm ambulance transport via [facility] with o2 for the transport. The Sw spoke to the pt's granddtr Nyla Perez and she's in agreement with the d/c plan home with [facility] and the 2pm ambulance transport. The address was verified by her. The pt Choice Form has been completed and placed in the pt's chart.

## 2020-05-28 NOTE — PROGRESS NOTES
"Surgery follow up  BP (!) 81/65   Pulse 70   Temp 98.6 °F (37 °C) (Axillary)   Resp 18   Ht 5' 8" (1.727 m)   Wt 81.7 kg (180 lb 1.9 oz)   SpO2 97%   BMI 27.39 kg/m²   I/O last 3 completed shifts:  In: 1100 [I.V.:50; NG/GT:100; IV Piggyback:950]  Out: 3757 [Urine:30; Drains:750; Other:2977]  I/O this shift:  In: 50 [IV Piggyback:50]  Out: 287 [Other:287]  Less restlessness,   Abdomen softer less distension, juan jose bloody drainage ng tube, off dialysis  Notes appreciated being discharged today with hospice care.     "

## 2020-05-29 NOTE — PROGRESS NOTES
"[facility]  Palliative Medicine  Progress Note    Patient Name: Danyel Rangel  MRN: [id number]  Admission Date: 5/23/2020  Hospital Length of Stay: 3 days  Code Status: Prior   Attending Provider: No att. providers found  Consulting Provider: Gia Woodard NP  Primary Care Physician: Pa Spence MD  Principal Problem:Cholecystitis without cholelithiasis    Patient information was obtained from relative(s), past medical records and ER records.      Assessment/Plan:     Goals of care, counseling/discussion  DNR  Continue treatment  Discharge home with hospice.        I will follow-up with patient. Please contact us if you have any additional questions.    Subjective:     Chief Complaint:   Chief Complaint   Patient presents with    Abdominal Pain     Pt with c/o R flank/abdominal pain that started today with associated SOB. LBM today.     Shortness of Breath       HPI:   Danyel Rangel is a 85 y.o. male who presents with right upper quadrant pain.  Patient reports this right upper quadrant pain is severe and unrelenting today.  He had a CT scan yesterday ordered by his primary doctor which showed gallstones.      Palliative medicine has been consulted for Emotional support/GOC discussion  Patient MET transferred to ICU. Spoke with grand daughter Nyla via phone for discussion. Discussed patient current disease trajectory. Patient requiring dialysis. Dialysis education provided. Nyla stated, "He is a very sweet man. He just lost his wife a few years ago. " Code status discussion. Full code vs DNR. "His wishes would to be to let him go and we don't want to him to suffer." Family goal is to continue treatment including dialysis but to make patient a DNR. Primary team aware. Thanks for the consult.     Hospital Course:  No notes on file        Medications:  Continuous Infusions:  Scheduled Meds:  PRN Meds:    Objective:     Vital Signs (Most Recent):  Temp: 98 °F (36.7 °C) (05/28/20 " 1227)  Pulse: 70 (05/28/20 1300)  Resp: 15 (05/28/20 1300)  BP: (!) 81/51 (05/28/20 1300)  SpO2: (!) 93 % (05/28/20 1300) Vital Signs (24h Range):        Weight: 81.7 kg (180 lb 1.9 oz)  Body mass index is 27.39 kg/m².    Review of Symptoms  Symptom Assessment (ESAS 0-10 scale)  ESAS 0 1 2 3 4 5 6 7 8 9 10   Pain              Dyspnea              Anxiety              Nausea              Depression               Anorexia              Fatigue              Insomnia              Restlessness               Agitation              CAM / Delirium __ --  ___+   Constipation     __ --  ___+   Diarrhea           __ --  ___+  Bowel Management Plan (BMP): Yes    Pain Assessment:yes    Performance Status: 50    ECOG Performance Status Grade: 4 - Completely disabled    Physical Exam   Constitutional: He appears well-developed.   HENT:   Head: Normocephalic and atraumatic.   Eyes: Conjunctivae are normal. Scleral icterus is present.   Pulmonary/Chest: He exhibits no tenderness.   Tachypneic   Abdominal: Soft. He exhibits no distension. There is no guarding.   Musculoskeletal: He exhibits no tenderness or deformity.   Neurological:   Somnolent   Skin: He is not diaphoretic.   +ecchymoses   Nursing note and vitals reviewed.      Significant Labs: All pertinent labs within the past 24 hours have been reviewed.  CBC:   Recent Labs   Lab 05/28/20  0350   WBC 20.77*  20.77*   HGB 8.9*  8.9*   HCT 27.6*  27.6*   MCV 97  97   *  131*     BMP:  No results for input(s): GLU, NA, K, CL, CO2, BUN, CREATININE, CALCIUM, MG in the last 24 hours.  LFT:  Lab Results   Component Value Date    AST 40 05/27/2020    ALKPHOS 56 05/27/2020    BILITOT 6.2 (H) 05/27/2020     Albumin:   Albumin   Date Value Ref Range Status   05/28/2020 2.6 (L) 3.5 - 5.2 g/dL Final   05/28/2020 2.6 (L) 3.5 - 5.2 g/dL Final     Protein:   Total Protein   Date Value Ref Range Status   05/27/2020 5.9 (L) 6.0 - 8.4 g/dL Final     Lactic acid:   No results found for:  LACTATE    Significant Imaging: I have reviewed all pertinent imaging results/findings within the past 24 hours.    Advance Care Planning   Advanced Directives::  Living Will: No  LaPOST: No  Do Not Resuscitate Status: Yes  Medical Power of : Yes.  Decision-Making Capacity: Family answered questions, Patient unable to communicate due to disease severity/cognitive impairment        Living Arrangements: Lives with family     Psychosocial/Cultural:  Patient's most important priorities:  Home to be with family     Patient's biggest concerns/fears:   Home to be with family  Previous death/end of life care history:     Home to be with family  Patient's goals/hopes:   Home to be with family           Recommendations:  Continue medical treatment  Code status: DNR  Discharge home with hospice  [facility] onboard.         Gia Woodard, MSN, APRN, NP-C   Palliative Medicine   [facility]  [phone] or [phone]          >50% of  60  min visit spent in chart review, face to face discussion of goals of care with patient, family, symptom assessment, coordination of care and emotional support.

## 2020-05-29 NOTE — SUBJECTIVE & OBJECTIVE
Medications:  Continuous Infusions:  Scheduled Meds:  PRN Meds:    Objective:     Vital Signs (Most Recent):  Temp: 98 °F (36.7 °C) (05/28/20 1227)  Pulse: 70 (05/28/20 1300)  Resp: 15 (05/28/20 1300)  BP: (!) 81/51 (05/28/20 1300)  SpO2: (!) 93 % (05/28/20 1300) Vital Signs (24h Range):        Weight: 81.7 kg (180 lb 1.9 oz)  Body mass index is 27.39 kg/m².    Review of Symptoms  Symptom Assessment (ESAS 0-10 scale)  ESAS 0 1 2 3 4 5 6 7 8 9 10   Pain              Dyspnea              Anxiety              Nausea              Depression               Anorexia              Fatigue              Insomnia              Restlessness               Agitation              CAM / Delirium __ --  ___+   Constipation     __ --  ___+   Diarrhea           __ --  ___+  Bowel Management Plan (BMP): Yes    Pain Assessment:yes    Performance Status: 50    ECOG Performance Status Grade: 4 - Completely disabled    Physical Exam   Constitutional: He appears well-developed.   HENT:   Head: Normocephalic and atraumatic.   Eyes: Conjunctivae are normal. Scleral icterus is present.   Pulmonary/Chest: He exhibits no tenderness.   Tachypneic   Abdominal: Soft. He exhibits no distension. There is no guarding.   Musculoskeletal: He exhibits no tenderness or deformity.   Neurological:   Somnolent   Skin: He is not diaphoretic.   +ecchymoses   Nursing note and vitals reviewed.      Significant Labs: All pertinent labs within the past 24 hours have been reviewed.  CBC:   Recent Labs   Lab 05/28/20  0350   WBC 20.77*  20.77*   HGB 8.9*  8.9*   HCT 27.6*  27.6*   MCV 97  97   *  131*     BMP:  No results for input(s): GLU, NA, K, CL, CO2, BUN, CREATININE, CALCIUM, MG in the last 24 hours.  LFT:  Lab Results   Component Value Date    AST 40 05/27/2020    ALKPHOS 56 05/27/2020    BILITOT 6.2 (H) 05/27/2020     Albumin:   Albumin   Date Value Ref Range Status   05/28/2020 2.6 (L) 3.5 - 5.2 g/dL Final   05/28/2020 2.6 (L) 3.5 - 5.2 g/dL  Final     Protein:   Total Protein   Date Value Ref Range Status   05/27/2020 5.9 (L) 6.0 - 8.4 g/dL Final     Lactic acid:   No results found for: LACTATE    Significant Imaging: I have reviewed all pertinent imaging results/findings within the past 24 hours.    Advance Care Planning   Advanced Directives::  Living Will: No  LaPOST: No  Do Not Resuscitate Status: Yes  Medical Power of : Yes.  Decision-Making Capacity: Family answered questions, Patient unable to communicate due to disease severity/cognitive impairment        Living Arrangements: Lives with family     Psychosocial/Cultural:  Patient's most important priorities:  Home to be with family     Patient's biggest concerns/fears:   Home to be with family  Previous death/end of life care history:     Home to be with family  Patient's goals/hopes:   Home to be with family           Recommendations:  Continue medical treatment  Code status: DNR  Discharge home with hospice  [facility].         Gia Woodard, MSN, APRN, NP-C   Palliative Medicine   [facility]  [phone] or [phone]          >50% of  60  min visit spent in chart review, face to face discussion of goals of care with patient, family, symptom assessment, coordination of care and emotional support.

## 2021-01-25 NOTE — PROGRESS NOTES
[facility] Hospital Medicine  Progress Note    Patient Name: Danyel Rangel  MRN: [id number]  Patient Class: IP- Inpatient   Admission Date: 5/23/2020  Length of Stay: 1 days  Attending Physician: Janak Kinney DO  Primary Care Provider: Pa Spence MD        Subjective:     Principal Problem:Cholecystitis without cholelithiasis        HPI:  Danyel Rangel is an 86 y/o M with PMH of Afib, CAD, HTN, and HLD admitted by surgery with cholecystitis without cholelithiasis and SOB. Noted RUQ pain with deep breath denies nausea, chills fever or vomiting. Hospital medicine has been consulted for medical management.    Overview/Hospital Course:  5/26 pt is awake and oriented, he is on CRRT given his low BP, feeling cold, ordering warming blanket    Interval History: on CRRT, awaiting final culture results, appreciate ID, gen surg and nephrology input    Review of Systems   Constitutional: Positive for activity change, chills and fatigue. Negative for fever.   Respiratory: Negative for cough and shortness of breath.    Cardiovascular: Negative for chest pain.   Genitourinary: Negative for hematuria.   Neurological: Negative for seizures and headaches.   Psychiatric/Behavioral: Negative for agitation and confusion.     Objective:     Vital Signs (Most Recent):  Temp: 98.3 °F (36.8 °C) (05/26/20 1512)  Pulse: 68 (05/26/20 1838)  Resp: (!) 32 (05/26/20 1838)  BP: 113/61 (05/26/20 1800)  SpO2: (!) 93 % (05/26/20 1838) Vital Signs (24h Range):  Temp:  [97.4 °F (36.3 °C)-98.7 °F (37.1 °C)] 98.3 °F (36.8 °C)  Pulse:  [66-85] 68  Resp:  [10-39] 32  SpO2:  [87 %-100 %] 93 %  BP: ()/() 113/61     Weight: 81.7 kg (180 lb 1.9 oz)  Body mass index is 27.39 kg/m².    Intake/Output Summary (Last 24 hours) at 5/26/2020 1902  Last data filed at 5/26/2020 1800  Gross per 24 hour   Intake 1355 ml   Output 862 ml   Net 493 ml      Physical Exam   Constitutional: He appears well-developed and well-nourished.    HENT:   Head: Atraumatic.   Pulmonary/Chest: Effort normal.   Abdominal: Soft.   Neurological: He is alert.   Psychiatric: He has a normal mood and affect.   Nursing note and vitals reviewed.      Significant Labs:   Recent Labs   Lab 05/24/20  0529 05/25/20 0450 05/26/20  0414   WBC 20.78* 22.90* 21.10*   HGB 11.5* 11.1* 10.1*   HCT 37.1* 35.2* 31.5*   * 151 162     Recent Labs   Lab 05/24/20  1534  05/25/20  2325 05/26/20  0414 05/26/20  0845 05/26/20  1423 05/26/20  1620   *   < > 135*  136 135*  135*  135* 138 137  137  --    K 5.6*  5.6*   < > 4.8  4.9 4.9  4.9  4.9 4.6 4.9  4.9  --       < > 103  103 103  103  103 104 103  103  --    CO2 23   < > 21*  22* 23  23  23 25 23  23  --    BUN 60*   < > 56*  57* 45*  45*  45* 39* 34*  34*  --    CREATININE 3.1*   < > 2.9*  2.9* 2.3*  2.3*  2.3* 2.1* 1.8*  1.8*  --    *   < > 110  111* 101  101  101 102 114*  114*  --    CALCIUM 8.3*   < > 8.4*  8.5* 8.1*  8.1*  8.1* 8.0* 8.0*  8.0*  --    MG  --    < > 2.3  --  2.4  --  2.4   PHOS  --    < > 4.2 3.5  --  3.6  --    LIPASE 30  --   --   --   --   --   --     < > = values in this interval not displayed.     Recent Labs   Lab 05/21/20  1056  05/24/20  0529 05/25/20  0450  05/25/20  2325 05/26/20  0414 05/26/20  1423   ALKPHOS 60   < > 62 52*  --   --  51*  --    ALT 16   < > 20 19  --   --  22  --    AST 27   < > 32 26  --   --  43*  --    ALBUMIN 3.8   < > 3.0* 2.6*   < > 2.4* 2.3*  2.3* 2.4*   PROT 6.6   < > 6.7 6.4  --   --  6.0  --    BILITOT 2.5*   < > 4.1* 5.6*  --   --  5.5*  --    INR 1.6*  --   --   --   --   --  1.1  --     < > = values in this interval not displayed.      Recent Labs     05/25/20  0117 05/25/20  0450 05/25/20  1225   TROPONINI 0.093* 0.094* 0.115*     Recent Labs   Lab 05/24/20  0653 05/24/20  1524 05/24/20  2308 05/25/20  0121 05/25/20  1134 05/25/20  1417   POCTGLUCOSE 164* 141* 65* 156* 140* 161*     Hemoglobin A1C   Date Value  Ref Range Status   04/26/2018 5.5 4.0 - 5.6 % Final     Comment:     According to ADA guidelines, hemoglobin A1c <7.0% represents  optimal control in non-pregnant diabetic patients. Different  metrics may apply to specific patient populations.   Standards of Medical Care in Diabetes-2016.  For the purpose of screening for the presence of diabetes:  <5.7%     Consistent with the absence of diabetes  5.7-6.4%  Consistent with increasing risk for diabetes   (prediabetes)  >or=6.5%  Consistent with diabetes  Currently, no consensus exists for use of hemoglobin A1c  for diagnosis of diabetes for children.  This Hemoglobin A1c assay has significant interference with fetal   hemoglobin   (HbF). The results are invalid for patients with abnormal amounts of   HbF,   including those with known Hereditary Persistence   of Fetal Hemoglobin. Heterozygous hemoglobin variants (HbAS, HbAC,   HbAD, HbAE, HbA2) do not significantly interfere with this assay;   however, presence of multiple variants in a sample may impact the %   interference.         No results for input(s): COLORU, CLARITYU, SPECGRAV, PHUR, PROTEINUA, GLUCOSEU, BLOODU, WBCU, RBCU, BACTERIA, MUCUS in the last 24 hours.    Invalid input(s):  BILIRUBINCON    Microbiology Results (last 7 days)     Procedure Component Value Units Date/Time    Blood culture [[id number]] Collected:  05/25/20 0911    Order Status:  Completed Specimen:  Blood Updated:  05/26/20 1612     Blood Culture, Routine No Growth to date      No Growth to date    Blood culture [[id number]]  (Abnormal) Collected:  05/23/20 1650    Order Status:  Completed Specimen:  Blood from Peripheral, Antecubital, Left Updated:  05/26/20 1141     Blood Culture, Routine Gram stain vinh bottle: Gram positive cocci in chains resembling Strep       Results called to and read back by:Kathya Sanchez RN 05/24/2020  19:03      ENTEROCOCCUS FAECALIS  For susceptibility see order #[id number]      Blood culture [[id number]]   (Abnormal) Collected:  05/23/20 1700    Order Status:  Completed Specimen:  Blood from Peripheral, Antecubital, Right Updated:  05/26/20 1141     Blood Culture, Routine Gram stain vinh bottle: Gram positive cocci in chains resembling Strep       Results called to and read back by: Brenna Will RN 05/24/2020  10:57      ENTEROCOCCUS FAECALIS  Susceptibility pending            Scheduled Meds:   apixaban  2.5 mg Oral BID    ceFEPime (MAXIPIME) IVPB  1 g Intravenous Q24H    docusate sodium  100 mg Oral BID    finasteride  5 mg Oral Daily    levothyroxine  50 mcg Oral Before breakfast    metoprolol succinate  50 mg Oral Daily    simvastatin  20 mg Oral QHS    sodium phosphate IVPB  15 mmol Intravenous Once     Continuous Infusions:  As Needed: albuterol-ipratropium, dextrose 50 % in water (D50W), haloperidol lactate, HYDROcodone-acetaminophen, ondansetron, promethazine (PHENERGAN) IVPB, [COMPLETED] Pharmacy to dose Vancomycin consult **AND** vancomycin - pharmacy to dose    Significant Imaging:   X-Ray Chest 1 View  Narrative: EXAMINATION:  XR CHEST 1 VIEW    CLINICAL HISTORY:  LSC Trialysis insertion;    TECHNIQUE:  Single frontal view of the chest was performed.    COMPARISON:  05/25/2020    FINDINGS:  Central line via a left subclavian approach terminates over the SVC.  The cardiac silhouette is unchanged.  Lung fields are unchanged noting low lung volumes and atelectasis with central vascular congestion.  No acute osseous findings.  Impression: Left subclavian approach central line terminates over the SVC.  No pneumothorax.    Electronically signed by: Jimbo French MD  Date:    05/25/2020  Time:    16:05  X-Ray Chest AP Portable  Narrative: EXAMINATION:  XR CHEST AP PORTABLE    CLINICAL HISTORY:  shortness of breath;    TECHNIQUE:  Single frontal view of the chest was performed.    COMPARISON:  05/24/2020    FINDINGS:  Hypoventilatory exam with low lung volumes.  Basilar opacities may represent atelectasis  and a small amount of pleural fluid.  No evidence of pneumothorax or large pleural effusion.  Left upper lung zone and right lung zones are relatively clear.  Cardiomediastinal silhouette is stable.  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  There is atherosclerosis.  ICD in stable position.  Bones show degenerative changes.  There is mild gaseous distention of the bowel.  Impression: Hypoventilatory exam with low lung volumes.  Mild left basilar opacity may represent atelectasis and/or small amount of pleural fluid.    Electronically signed by: Shay Banda MD  Date:    05/25/2020  Time:    14:53  Echo Color Flow Doppler? Yes  Addendum: · Mildly decreased left ventricular systolic function. The estimated  ejection fraction is 45%.   · Grade III (severe) left ventricular diastolic dysfunction consistent  with restrictive physiology.   · Septal wall has abnormal motion consistent with right ventricular  pacemaker.   · Concentric left ventricular remodeling.   · Mildly to moderately reduced right ventricular systolic function.   · Severe left atrial enlargement.   · Severe tricuspid regurgitation.   · Mild-to-moderate aortic valve stenosis.   · Aortic valve area is 1.47 cm2; peak velocity is 1.45 m/s; mean gradient  is 5 mmHg.  Narrative: · Mildly decreased left ventricular systolic function. The estimated   ejection fraction is 45%.  · Grade III (severe) left ventricular diastolic dysfunction consistent   with restrictive physiology.  · Septal wall has abnormal motion consistent with right ventricular   pacemaker.  · Concentric left ventricular remodeling.  · Mildly to moderately reduced right ventricular systolic function.  · Severe left atrial enlargement.  · Severe tricuspid regurgitation.     CT Head Without Contrast  Narrative: EXAMINATION:  CT HEAD WITHOUT CONTRAST    CLINICAL HISTORY:  Confusion/delirium, altered LOC, unexplained;    TECHNIQUE:  Low dose axial images were obtained through the  head.  Coronal and sagittal reformations were also performed. Contrast was not administered.    COMPARISON:  Head CT/5/21/2020    FINDINGS:  There is no acute intracranial hemorrhage, hydrocephalus, midline shift or mass effect. Brain parenchyma appears stable with findings suggestive of chronic microvascular ischemic change.  The basal cisterns are patent. The mastoid air cells and paranasal sinuses are clear of acute process. The visualized bones of the calvarium demonstrate no acute osseous abnormality.  Impression: 1. No CT evidence of acute intracranial abnormality or significant interval detrimental change from prior head CT 5/21/2020.  Clinical correlation and further evaluation as warranted.  2. Generalized cerebral volume loss and findings suggestive of chronic microvascular ischemic change.    Electronically signed by: Trang Gauthier MD  Date:    05/25/2020  Time:    05:02  X-Ray Chest AP Portable  Narrative: EXAMINATION:  XR CHEST AP PORTABLE    CLINICAL HISTORY:  shortness of breath;    TECHNIQUE:  Single frontal view of the chest was performed.    COMPARISON:  08:48    FINDINGS:  Lungs are significantly hypoinflated.  Left basilar atelectatic changes are seen.  Heart is stable in size.  No evidence of pneumothorax or large pleural effusion.  Impression: No significant change.    Electronically signed by: Jermaine Bradley MD  Date:    05/24/2020  Time:    23:57        Assessment/Plan:      * Cholecystitis without cholelithiasis  Leukocytosis  Surgery on board  GI consult- s/p ERCP with no stent placement.    5/26 awaiting final cutlres sensitvity, continue current abx    Bacteremia  Blood cx with enterococcus resembling strep- continue Vancomycin  Repeat blood cx  Consult ID  [id number] continue current vanc and cefepime    Dysphagia  Awaits SLP eval      JOI (acute kidney injury)  CKD (chronic kidney disease), stage III  Hyperkalemia  Avoid nephrotoxic medication  Strict input/ output  Renally dose of  medication  Gentle rehydration  Treat hyperk  Consult nephrology- appreciates rec's  Kidney USS with bilateral perinephric stranding.  continue Zosyn      Chronic systolic congestive heart failure  AICD (automatic cardioverter/defibrillator) present  S/P CABG (coronary artery bypass graft)  Coronary artery disease involving native coronary artery of native heart without angina pectoris  Continue statin  Hold Lasix  Monitor  Hold K          Benign prostatic hyperplasia with weak urinary stream    Continue Proscar    Hypothyroidism  Continue Synthroid      HTN (hypertension), benign  Hyperlipidemia  Continues statin  Continue Metoprolol,   Hold Lasix        Chronic atrial fibrillation  Continue metoprolol  Eliquis was on hold for procedure, will restart at lower dose due on hold         VTE Risk Mitigation (From admission, onward)         Ordered     apixaban tablet 2.5 mg  2 times daily      05/25/20 0904                Critical care time spent on the evaluation and treatment of severe organ dysfunction, review of pertinent labs and imaging studies, discussions with consulting providers and discussions with patient/family: 35 minutes.      Janak Kinney DO  Department of Hospital Medicine   [facility]   [facility]

## (undated) DEVICE — DRAPE PLASTIC U 60X72

## (undated) DEVICE — PAD PREP 50/CA

## (undated) DEVICE — BIT BRILL 2.5

## (undated) DEVICE — SEE MEDLINE ITEM 156955

## (undated) DEVICE — BLADE SURG CARBON STEEL #10

## (undated) DEVICE — SEE MEDLINE ITEM 146292

## (undated) DEVICE — SLING ARM COMFT NAVY BLU LG

## (undated) DEVICE — SEE MEDLINE ITEM 157131

## (undated) DEVICE — PADDING CAST 6 INCH

## (undated) DEVICE — BIT DRILL 2.0MM D DEPTH 110MM

## (undated) DEVICE — BANDAGE ACE NON LATEX 4IN

## (undated) DEVICE — GLOVE SURG BIOGEL LATEX SZ 7.5

## (undated) DEVICE — SPONGE LAP 18X18 PREWASHED

## (undated) DEVICE — SEE MEDLINE ITEM 146271

## (undated) DEVICE — PADDING CAST 4IN

## (undated) DEVICE — PAD ABDOMINAL 5X9 STERILE

## (undated) DEVICE — GAUZE SPONGE 4X4 12PLY

## (undated) DEVICE — COVER OVERHEAD SURG LT BLUE

## (undated) DEVICE — SEE MEDLINE ITEM 157117

## (undated) DEVICE — DRAPE EXTREMITY FULL FABRIC

## (undated) DEVICE — BLADE SURG #15 CARBON STEEL

## (undated) DEVICE — SEE MEDLINE ITEM 152622

## (undated) DEVICE — BIT DRILL 2.8 W/QC PERCU 200MM

## (undated) DEVICE — SYS CLSR DERMABOND PRINEO 22CM

## (undated) DEVICE — APPLICATOR CHLORAPREP ORN 26ML

## (undated) DEVICE — DRAPE C-ARM/MOBILE XRAY 44X80

## (undated) DEVICE — Device

## (undated) DEVICE — PACK OPTIMA GEN ORTHO

## (undated) DEVICE — SEE MEDLINE ITEM 157116

## (undated) DEVICE — ELECTRODE REM PLYHSV RETURN 9

## (undated) DEVICE — SEE MEDLINE ITEM 146345